# Patient Record
Sex: MALE | Race: ASIAN | NOT HISPANIC OR LATINO | URBAN - METROPOLITAN AREA
[De-identification: names, ages, dates, MRNs, and addresses within clinical notes are randomized per-mention and may not be internally consistent; named-entity substitution may affect disease eponyms.]

---

## 2023-07-11 ENCOUNTER — INPATIENT (INPATIENT)
Facility: HOSPITAL | Age: 63
LOS: 1 days | Discharge: ANOTHER HOSPITAL | End: 2023-07-13
Attending: INTERNAL MEDICINE | Admitting: INTERNAL MEDICINE
Payer: COMMERCIAL

## 2023-07-11 VITALS
DIASTOLIC BLOOD PRESSURE: 82 MMHG | SYSTOLIC BLOOD PRESSURE: 136 MMHG | TEMPERATURE: 98 F | RESPIRATION RATE: 18 BRPM | HEART RATE: 118 BPM | OXYGEN SATURATION: 100 %

## 2023-07-11 DIAGNOSIS — I21.3 ST ELEVATION (STEMI) MYOCARDIAL INFARCTION OF UNSPECIFIED SITE: ICD-10-CM

## 2023-07-11 LAB
ALBUMIN SERPL ELPH-MCNC: 4.6 G/DL — SIGNIFICANT CHANGE UP (ref 3.3–5)
ALP SERPL-CCNC: 73 U/L — SIGNIFICANT CHANGE UP (ref 40–120)
ALT FLD-CCNC: 16 U/L — SIGNIFICANT CHANGE UP (ref 4–41)
ANION GAP SERPL CALC-SCNC: 17 MMOL/L — HIGH (ref 7–14)
APTT BLD: 28.8 SEC — SIGNIFICANT CHANGE UP (ref 27–36.3)
AST SERPL-CCNC: 19 U/L — SIGNIFICANT CHANGE UP (ref 4–40)
BASOPHILS # BLD AUTO: 0.03 K/UL — SIGNIFICANT CHANGE UP (ref 0–0.2)
BASOPHILS NFR BLD AUTO: 0.5 % — SIGNIFICANT CHANGE UP (ref 0–2)
BILIRUB SERPL-MCNC: 0.4 MG/DL — SIGNIFICANT CHANGE UP (ref 0.2–1.2)
BUN SERPL-MCNC: 15 MG/DL — SIGNIFICANT CHANGE UP (ref 7–23)
CALCIUM SERPL-MCNC: 10 MG/DL — SIGNIFICANT CHANGE UP (ref 8.4–10.5)
CHLORIDE SERPL-SCNC: 100 MMOL/L — SIGNIFICANT CHANGE UP (ref 98–107)
CO2 SERPL-SCNC: 21 MMOL/L — LOW (ref 22–31)
CREAT SERPL-MCNC: 0.98 MG/DL — SIGNIFICANT CHANGE UP (ref 0.5–1.3)
EGFR: 87 ML/MIN/1.73M2 — SIGNIFICANT CHANGE UP
EOSINOPHIL # BLD AUTO: 0.27 K/UL — SIGNIFICANT CHANGE UP (ref 0–0.5)
EOSINOPHIL NFR BLD AUTO: 4.2 % — SIGNIFICANT CHANGE UP (ref 0–6)
GLUCOSE SERPL-MCNC: 110 MG/DL — HIGH (ref 70–99)
HCT VFR BLD CALC: 42.7 % — SIGNIFICANT CHANGE UP (ref 39–50)
HGB BLD-MCNC: 14.3 G/DL — SIGNIFICANT CHANGE UP (ref 13–17)
IANC: 3.58 K/UL — SIGNIFICANT CHANGE UP (ref 1.8–7.4)
IMM GRANULOCYTES NFR BLD AUTO: 0.2 % — SIGNIFICANT CHANGE UP (ref 0–0.9)
INR BLD: 0.99 RATIO — SIGNIFICANT CHANGE UP (ref 0.88–1.16)
LYMPHOCYTES # BLD AUTO: 1.84 K/UL — SIGNIFICANT CHANGE UP (ref 1–3.3)
LYMPHOCYTES # BLD AUTO: 28.6 % — SIGNIFICANT CHANGE UP (ref 13–44)
MAGNESIUM SERPL-MCNC: 2 MG/DL — SIGNIFICANT CHANGE UP (ref 1.6–2.6)
MCHC RBC-ENTMCNC: 28 PG — SIGNIFICANT CHANGE UP (ref 27–34)
MCHC RBC-ENTMCNC: 33.5 GM/DL — SIGNIFICANT CHANGE UP (ref 32–36)
MCV RBC AUTO: 83.7 FL — SIGNIFICANT CHANGE UP (ref 80–100)
MONOCYTES # BLD AUTO: 0.71 K/UL — SIGNIFICANT CHANGE UP (ref 0–0.9)
MONOCYTES NFR BLD AUTO: 11 % — SIGNIFICANT CHANGE UP (ref 2–14)
NEUTROPHILS # BLD AUTO: 3.58 K/UL — SIGNIFICANT CHANGE UP (ref 1.8–7.4)
NEUTROPHILS NFR BLD AUTO: 55.5 % — SIGNIFICANT CHANGE UP (ref 43–77)
NRBC # BLD: 0 /100 WBCS — SIGNIFICANT CHANGE UP (ref 0–0)
NRBC # FLD: 0 K/UL — SIGNIFICANT CHANGE UP (ref 0–0)
NT-PROBNP SERPL-SCNC: 169 PG/ML — SIGNIFICANT CHANGE UP
PLATELET # BLD AUTO: 206 K/UL — SIGNIFICANT CHANGE UP (ref 150–400)
POTASSIUM SERPL-MCNC: 3.9 MMOL/L — SIGNIFICANT CHANGE UP (ref 3.5–5.3)
POTASSIUM SERPL-SCNC: 3.9 MMOL/L — SIGNIFICANT CHANGE UP (ref 3.5–5.3)
PROT SERPL-MCNC: 7.4 G/DL — SIGNIFICANT CHANGE UP (ref 6–8.3)
PROTHROM AB SERPL-ACNC: 11.5 SEC — SIGNIFICANT CHANGE UP (ref 10.5–13.4)
RBC # BLD: 5.1 M/UL — SIGNIFICANT CHANGE UP (ref 4.2–5.8)
RBC # FLD: 12 % — SIGNIFICANT CHANGE UP (ref 10.3–14.5)
SODIUM SERPL-SCNC: 138 MMOL/L — SIGNIFICANT CHANGE UP (ref 135–145)
TROPONIN T, HIGH SENSITIVITY RESULT: 48 NG/L — SIGNIFICANT CHANGE UP
TSH SERPL-MCNC: 2.71 UIU/ML — SIGNIFICANT CHANGE UP (ref 0.27–4.2)
WBC # BLD: 6.44 K/UL — SIGNIFICANT CHANGE UP (ref 3.8–10.5)
WBC # FLD AUTO: 6.44 K/UL — SIGNIFICANT CHANGE UP (ref 3.8–10.5)

## 2023-07-11 PROCEDURE — 99291 CRITICAL CARE FIRST HOUR: CPT

## 2023-07-11 PROCEDURE — 93010 ELECTROCARDIOGRAM REPORT: CPT

## 2023-07-11 PROCEDURE — 99223 1ST HOSP IP/OBS HIGH 75: CPT

## 2023-07-11 RX ORDER — HEPARIN SODIUM 5000 [USP'U]/ML
4000 INJECTION INTRAVENOUS; SUBCUTANEOUS EVERY 6 HOURS
Refills: 0 | Status: DISCONTINUED | OUTPATIENT
Start: 2023-07-11 | End: 2023-07-11

## 2023-07-11 RX ORDER — DEXTROSE 50 % IN WATER 50 %
15 SYRINGE (ML) INTRAVENOUS ONCE
Refills: 0 | Status: DISCONTINUED | OUTPATIENT
Start: 2023-07-11 | End: 2023-07-13

## 2023-07-11 RX ORDER — FAMOTIDINE 10 MG/ML
1 INJECTION INTRAVENOUS
Refills: 0 | DISCHARGE

## 2023-07-11 RX ORDER — INSULIN LISPRO 100/ML
VIAL (ML) SUBCUTANEOUS
Refills: 0 | Status: DISCONTINUED | OUTPATIENT
Start: 2023-07-11 | End: 2023-07-13

## 2023-07-11 RX ORDER — TICAGRELOR 90 MG/1
90 TABLET ORAL EVERY 12 HOURS
Refills: 0 | Status: DISCONTINUED | OUTPATIENT
Start: 2023-07-12 | End: 2023-07-12

## 2023-07-11 RX ORDER — INSULIN LISPRO 100/ML
VIAL (ML) SUBCUTANEOUS AT BEDTIME
Refills: 0 | Status: DISCONTINUED | OUTPATIENT
Start: 2023-07-11 | End: 2023-07-13

## 2023-07-11 RX ORDER — ATORVASTATIN CALCIUM 80 MG/1
80 TABLET, FILM COATED ORAL AT BEDTIME
Refills: 0 | Status: DISCONTINUED | OUTPATIENT
Start: 2023-07-11 | End: 2023-07-13

## 2023-07-11 RX ORDER — METOPROLOL TARTRATE 50 MG
2.5 TABLET ORAL ONCE
Refills: 0 | Status: COMPLETED | OUTPATIENT
Start: 2023-07-11 | End: 2023-07-11

## 2023-07-11 RX ORDER — HEPARIN SODIUM 5000 [USP'U]/ML
4000 INJECTION INTRAVENOUS; SUBCUTANEOUS ONCE
Refills: 0 | Status: DISCONTINUED | OUTPATIENT
Start: 2023-07-11 | End: 2023-07-11

## 2023-07-11 RX ORDER — DEXTROSE 50 % IN WATER 50 %
12.5 SYRINGE (ML) INTRAVENOUS ONCE
Refills: 0 | Status: DISCONTINUED | OUTPATIENT
Start: 2023-07-11 | End: 2023-07-13

## 2023-07-11 RX ORDER — CHLORHEXIDINE GLUCONATE 213 G/1000ML
1 SOLUTION TOPICAL DAILY
Refills: 0 | Status: DISCONTINUED | OUTPATIENT
Start: 2023-07-12 | End: 2023-07-13

## 2023-07-11 RX ORDER — CHOLECALCIFEROL (VITAMIN D3) 125 MCG
1 CAPSULE ORAL
Refills: 0 | DISCHARGE

## 2023-07-11 RX ORDER — METOPROLOL TARTRATE 50 MG
25 TABLET ORAL ONCE
Refills: 0 | Status: COMPLETED | OUTPATIENT
Start: 2023-07-11 | End: 2023-07-11

## 2023-07-11 RX ORDER — HEPARIN SODIUM 5000 [USP'U]/ML
5000 INJECTION INTRAVENOUS; SUBCUTANEOUS ONCE
Refills: 0 | Status: COMPLETED | OUTPATIENT
Start: 2023-07-11 | End: 2023-07-11

## 2023-07-11 RX ORDER — DEXTROSE 50 % IN WATER 50 %
25 SYRINGE (ML) INTRAVENOUS ONCE
Refills: 0 | Status: DISCONTINUED | OUTPATIENT
Start: 2023-07-11 | End: 2023-07-13

## 2023-07-11 RX ORDER — HEPARIN SODIUM 5000 [USP'U]/ML
1000 INJECTION INTRAVENOUS; SUBCUTANEOUS
Qty: 25000 | Refills: 0 | Status: DISCONTINUED | OUTPATIENT
Start: 2023-07-11 | End: 2023-07-13

## 2023-07-11 RX ORDER — NITROGLYCERIN 6.5 MG
10 CAPSULE, EXTENDED RELEASE ORAL
Qty: 50 | Refills: 0 | Status: DISCONTINUED | OUTPATIENT
Start: 2023-07-11 | End: 2023-07-12

## 2023-07-11 RX ORDER — METOPROLOL TARTRATE 50 MG
12.5 TABLET ORAL
Refills: 0 | Status: DISCONTINUED | OUTPATIENT
Start: 2023-07-12 | End: 2023-07-12

## 2023-07-11 RX ORDER — ASPIRIN/CALCIUM CARB/MAGNESIUM 324 MG
81 TABLET ORAL DAILY
Refills: 0 | Status: DISCONTINUED | OUTPATIENT
Start: 2023-07-12 | End: 2023-07-13

## 2023-07-11 RX ORDER — SODIUM CHLORIDE 9 MG/ML
1000 INJECTION, SOLUTION INTRAVENOUS
Refills: 0 | Status: DISCONTINUED | OUTPATIENT
Start: 2023-07-11 | End: 2023-07-13

## 2023-07-11 RX ORDER — GLUCAGON INJECTION, SOLUTION 0.5 MG/.1ML
1 INJECTION, SOLUTION SUBCUTANEOUS ONCE
Refills: 0 | Status: DISCONTINUED | OUTPATIENT
Start: 2023-07-11 | End: 2023-07-13

## 2023-07-11 RX ORDER — HEPARIN SODIUM 5000 [USP'U]/ML
INJECTION INTRAVENOUS; SUBCUTANEOUS
Qty: 25000 | Refills: 0 | Status: DISCONTINUED | OUTPATIENT
Start: 2023-07-11 | End: 2023-07-11

## 2023-07-11 RX ORDER — ASPIRIN/CALCIUM CARB/MAGNESIUM 324 MG
324 TABLET ORAL ONCE
Refills: 0 | Status: COMPLETED | OUTPATIENT
Start: 2023-07-11 | End: 2023-07-11

## 2023-07-11 RX ORDER — ATORVASTATIN CALCIUM 80 MG/1
1 TABLET, FILM COATED ORAL
Refills: 0 | DISCHARGE

## 2023-07-11 RX ADMIN — HEPARIN SODIUM 1000 UNIT(S)/HR: 5000 INJECTION INTRAVENOUS; SUBCUTANEOUS at 22:12

## 2023-07-11 RX ADMIN — Medication 324 MILLIGRAM(S): at 21:34

## 2023-07-11 RX ADMIN — ATORVASTATIN CALCIUM 80 MILLIGRAM(S): 80 TABLET, FILM COATED ORAL at 23:15

## 2023-07-11 RX ADMIN — Medication 2.5 MILLIGRAM(S): at 22:04

## 2023-07-11 RX ADMIN — HEPARIN SODIUM 10 UNIT(S)/HR: 5000 INJECTION INTRAVENOUS; SUBCUTANEOUS at 23:38

## 2023-07-11 RX ADMIN — HEPARIN SODIUM 5000 UNIT(S): 5000 INJECTION INTRAVENOUS; SUBCUTANEOUS at 22:11

## 2023-07-11 RX ADMIN — Medication 3 MICROGRAM(S)/MIN: at 23:11

## 2023-07-11 RX ADMIN — Medication 25 MILLIGRAM(S): at 22:04

## 2023-07-11 NOTE — ED PROVIDER NOTE - OBJECTIVE STATEMENT
63y male with hx HTN DM on metformin presents after +stress test today at 2pm. afterwards had chest pressure and generalized weakness. had chest pain initially 1 month ago that radiated to the L upper extremity. since then CP had resolved, but had ROWLAND. had stress test with Dr. Posada at 1400 which was positive. at ~1530 began to have chest pressure. non radicular. no sob at rest no numbness tingling. weak in the lower extremities. given brilinta by Dr. Posada. no aspirin given. no hx AFIB. no recent illness.

## 2023-07-11 NOTE — ED PROVIDER NOTE - PHYSICAL EXAMINATION
General: non-toxic, NAD  HEENT: NCAT, PERRL, no conjunctival pallor   Cardiac: RRR, no murmurs, 2+ peripheral pulses  Resp: CTAB  Abdomen: soft, non-distended, bowel sounds present, no ttp, no rebound or guarding. no organomegaly  Extremities: no peripheral edema, calf tenderness, or leg size discrepancies  Skin: no rashes  Neuro: AAOx4, 5+motor, sensation grossly intact  Psych: mood and affect appropriate

## 2023-07-11 NOTE — ED PROVIDER NOTE - CLINICAL SUMMARY MEDICAL DECISION MAKING FREE TEXT BOX
well appearing male presents in rapid AFIB with chest pressure after positive stress today. STEMI alert called on arrival to resusc bay. elevated BP. ASA given and heparin to be initiated. telemetry. CCU eval possible cath. TBA.

## 2023-07-11 NOTE — H&P ADULT - HISTORY OF PRESENT ILLNESS
Patient is 63 year old male with PMHx HTN, T2DM on metformin who presents to [facility] ED after +stress test 7/11 at 2pm. Patient afterwards ~@1530 began complaining of severe chest pressure with radiation to L arm/shoulder and generalized weakness. Pt states he had similar chest pain ~1 month ago but notes chest pain at that time resolved. Patient denies SOB, palpitations, history of A fib or recent illness. Patient follows with cardiologist Dr. Posada.  Patient received Brilinta 180 mg x1 in Dr. Posada's office,   ED course- cardiology was called initially for STEMI alert. Upon review of EKG, pt does not meet STEMI criteria- , A fib RVR, LEON in aVR with ST depressions leads II, III, aVF, V3-V6. Patient with continued chest pressure. Pt s/p load with ASA/Heparin as per ACS protocol, 2.5 mg IVP Lopressor x1 and 25 mg PO Lopressor for rate control.   Patient is 63 year old male with PMHx HTN, T2DM on metformin who presents to [facility] ED after +stress test 7/11 at 2pm. Patient afterwards ~@1530 began complaining of severe chest pressure with radiation to L arm/shoulder and generalized weakness. Pt states he had similar chest pain ~1 month ago but notes chest pain at that time resolved. Patient admits to extensive family history of CAD. Patient denies SOB, palpitations, history of CAD, HF, or recent illness. Patient follows with cardiologist Dr. Posada.  Patient received Brilinta 180 mg x1 in Dr. Posada's office,   ED course- cardiology was called initially for STEMI alert. Upon review of EKG, pt does not meet STEMI criteria- , A fib RVR, LEON in aVR with ST depressions leads II, III, aVF, V3-V6. Patient with continued chest pressure. Pt s/p load with ASA/Heparin as per ACS protocol, 2.5 mg IVP Lopressor x1 and 25 mg PO Lopressor for rate control. Troponin 48.   Patient is 63 year old male with PMHx HTN, T2DM on metformin who presents to [facility] ED after stress test 7/11 at 2pm. Patient afterwards ~@1530 began complaining of severe chest pressure with radiation to L arm/shoulder and generalized weakness. Pt states he had similar chest pain ~1 month ago but notes chest pain at that time resolved. Patient admits to extensive family history of CAD. Patient denies SOB, palpitations, history of CAD, HF, or recent illness. Patient follows with cardiologist Dr. Posada.  Patient received Brilinta 180 mg x1 in Dr. Posada's office,   ED course- cardiology was called initially for STEMI alert. Upon review of EKG, pt does not meet STEMI criteria- , A fib RVR, LEON in aVR with ST depressions leads II, III, aVF, V3-V6. Patient with continued chest pressure. Pt s/p load with ASA/Heparin as per ACS protocol, 2.5 mg IVP Lopressor x1 and 25 mg PO Lopressor for rate control. Troponin 48.   Patient is 63 year old male with PMHx HTN, T2DM on metformin who presents to [facility] ED after +stress test 7/11 at 2pm. Patient afterwards ~@1530 began complaining of severe chest pressure with radiation to L arm/shoulder and generalized weakness. Pt states he had similar chest pain ~1 month ago but notes chest pain at that time resolved. Patient admits to extensive family history of CAD. Patient denies SOB, palpitations, history of CAD, HF, or recent illness. Patient follows with cardiologist Dr. Posada.  Patient received Brilinta 180 mg x1 in Dr. Posada's office,   ED course- cardiology was called initially for STEMI alert. Upon review of EKG, pt does not meet STEMI criteria- , A fib RVR, LEON in aVR with ST depressions leads II, III, aVF, V3-V6. Patient with continued chest pressure. Pt s/p load with ASA/Heparin as per ACS protocol, 2.5 mg IVP Lopressor x1 and 25 mg PO Lopressor for rate control. Troponin 48.

## 2023-07-11 NOTE — H&P ADULT - NSICDXPASTMEDICALHX_GEN_ALL_CORE_FT
PAST MEDICAL HISTORY:  Diabetes mellitus     HTN (hypertension)      PAST MEDICAL HISTORY:  Diabetes mellitus     GERD (gastroesophageal reflux disease)     HTN (hypertension)     Hyperlipidemia

## 2023-07-11 NOTE — ED PROVIDER NOTE - PROGRESS NOTE DETAILS
VINCE Mittal PGY3 triage ecg with inferolateral depressions and AVR elevation. Dr. Posada made aware. Dr. Tapia has called STEMI alert. CCU to see. heparin to be ordered once documented weight.

## 2023-07-11 NOTE — H&P ADULT - NSHPSOCIALHISTORY_GEN_ALL_CORE
Denies history of smoking, ETOH use, illicit drug use  Patient is  and works as a /- no heavy labor involved

## 2023-07-11 NOTE — H&P ADULT - NSHPLABSRESULTS_GEN_ALL_CORE
Current Antibiotics:    Other medications:  atorvastatin 80 milliGRAM(s) Oral at bedtime  dextrose 5%. 1000 milliLiter(s) IV Continuous <Continuous>  dextrose 5%. 1000 milliLiter(s) IV Continuous <Continuous>  dextrose 50% Injectable 25 Gram(s) IV Push once  dextrose 50% Injectable 12.5 Gram(s) IV Push once  dextrose 50% Injectable 25 Gram(s) IV Push once  glucagon  Injectable 1 milliGRAM(s) IntraMuscular once  heparin  Infusion 1000 Unit(s)/Hr IV Continuous <Continuous>  insulin lispro (ADMELOG) corrective regimen sliding scale   SubCutaneous three times a day before meals  insulin lispro (ADMELOG) corrective regimen sliding scale   SubCutaneous at bedtime  nitroglycerin  Infusion 10 MICROgram(s)/Min IV Continuous <Continuous>      =======================================================  Labs:                        14.3   6.44  )-----------( 206 ( 11 Jul 2023 21:55 )             42.7     07-11    138  |  100  |  15  ----------------------------<  110<H>  3.9   |  21<L>  |  0.98    Ca    10.0      11 Jul 2023 21:55  Mg     2.00     07-11    TPro  7.4  /  Alb  4.6  /  TBili  0.4  /  DBili  x   /  AST  19  /  ALT  16  /  AlkPhos  73  07-11      Creatinine: 0.98 mg/dL (07-11-23 @ 21:55)            WBC Count: 6.44 K/uL (07-11-23 @ 21:55)        Alkaline Phosphatase: 73 U/L (07-11-23 @ 21:55)  Alanine Aminotransferase (ALT/SGPT): 16 U/L (07-11-23 @ 21:55)  Aspartate Aminotransferase (AST/SGOT): 19 U/L (07-11-23 @ 21:55)  Bilirubin Total: 0.4 mg/dL (07-11-23 @ 21:55)

## 2023-07-11 NOTE — ED ADULT TRIAGE NOTE - CHIEF COMPLAINT QUOTE
pt coming from stress test.  pt states he failed his stress test and was told to come to the ED.  pt c/o chest "congestion".  Hx:  DM, HTN, HLD

## 2023-07-11 NOTE — H&P ADULT - NSHPPHYSICALEXAM_GEN_ALL_CORE
VITALS:   T(C): 36.7 (07-11-23 @ 22:41), Max: 36.9 (07-11-23 @ 21:31)  HR: 84 (07-11-23 @ 23:00) (84 - 125)  BP: 120/86 (07-11-23 @ 23:00) (89/79 - 160/106)  RR: 17 (07-11-23 @ 23:00) (16 - 22)  SpO2: 99% (07-11-23 @ 23:00) (99% - 100%)    GENERAL: NAD, lying in bed comfortably  HEAD:  Normocephalic  EYES: Sclera clear  ENT: Moist mucous membranes  NECK: Supple, No JVD  CHEST/LUNG: Clear to auscultation bilaterally; No rales, rhonchi, wheezing, or rubs. Unlabored respirations  HEART: Regular rate and rhythm; No murmurs, rubs, or gallops  ABDOMEN: BSx4; Soft, nontender, nondistended  EXTREMITIES:  2+ Peripheral Pulses, brisk capillary refill. No clubbing, cyanosis, or edema  NERVOUS SYSTEM:  A&Ox3, no focal deficits   SKIN: No rashes or lesions VITALS:   T(C): 36.7 (07-11-23 @ 22:41), Max: 36.9 (07-11-23 @ 21:31)  HR: 84 (07-11-23 @ 23:00) (84 - 125)  BP: 120/86 (07-11-23 @ 23:00) (89/79 - 160/106)  RR: 17 (07-11-23 @ 23:00) (16 - 22)  SpO2: 99% (07-11-23 @ 23:00) (99% - 100%)    GENERAL: NAD, cooperative with exam  HEAD:  Normocephalic  EYES: Sclera clear  ENT: Moist mucous membranes  NECK: Supple, No JVD  CHEST/LUNG: Clear to auscultation bilaterally; No rales, rhonchi, wheezing, or rubs. Unlabored respirations  HEART: +Irregularly irregular rhythm, +S1 S2, No murmurs, rubs, or gallops  ABDOMEN: BSx4; Soft, nontender, nondistended  EXTREMITIES:  2+ Peripheral Pulses, brisk capillary refill. No clubbing, cyanosis, or peripheral edema  NERVOUS SYSTEM:  A&Ox3, no focal deficits   SKIN: No rashes or lesions

## 2023-07-11 NOTE — ED ADULT NURSE NOTE - OBJECTIVE STATEMENT
63 year old male coming for chest pain. AS per pt he has been having chest pain for about 1 month. Pt had a stress test today, had chest pain after. Endorses back pain Denies nausea, vomiting, sob, dizziness, headache, arm pain, jaw pain, diaphoresis.  Pt talking in full sentences. Lungs clear, Elizabeth RN-63 year old male coming for chest pain. AS per pt he has been having chest pain for about 1 month. Pt had a stress test today which was positive , had chest pain after, sent to ED. Endorses back pain Denies nausea, vomiting, sob, dizziness, headache, arm pain, jaw pain, diaphoresis.  Pt talking in full sentences. Lungs clear, 18G placed in right a/c , 20g in right hand labs sent, pt medicated.

## 2023-07-11 NOTE — ED PROVIDER NOTE - NS ED ROS FT
Constitutional: no fevers, chills  HEENT: no HA, vision changes, rhinorrhea, sore throat  Cardiac: +chest pain, palpitations  Respiratory: no SOB, cough or hemoptysis  GI: no n/v/d/c, abd pain, bloody or dark stools  : no dysuria, frequency, or hematuria  MSK: no joint pain, neck pain or back pain  Skin: no rashes, jaundice, pruritis  Neuro: no numbness/tingling, +weakness, no unsteady gait

## 2023-07-11 NOTE — ED PROVIDER NOTE - ATTENDING CONTRIBUTION TO CARE
63y male with hx HTN DM on metformin presents after +stress test today at 2pm. afterwards had chest pressure and generalized weakness. had chest pain initially 1 month ago that radiated to the L upper extremity. since then CP had resolved, but had ROWLAND. had stress test with Dr. Posada at 1400 which was positive. at ~1530 began to have chest pressure. non radicular. no sob at rest no numbness tingling. weak in the lower extremities. given brilinta by Dr. Posada. no aspirin given. no hx AFIB. no recent illness.  pt stll with squeezing chest pressure, no sob  tachycardic  code stemi called, dw dr riley obregon, will not activate cath labs, recs to start heparin gtt and CCU to eval

## 2023-07-11 NOTE — H&P ADULT - ASSESSMENT
Patient is 63 year old male with PMHx HTN, T2DM on metformin who presents to [facility] ED after +stress test 7/11 at 2pm. Patient afterwards ~@1530 began complaining of severe chest pressure with radiation to L arm/shoulder and generalized weakness. Patient s/p Brilinta 180 mg x1 in cardiologist Dr. Posada's office, s/p load with ASA/Heparin as per ACS protocol in ED, EKG significant for , A fib RVR, LEON in aVR with ST depressions leads II, III, aVF, V3-V6. Patient does not meet STEMI criteria. Pt admitted to CCU for further management of NSTEMI and symptomatic treatment.    NEUROLOGIC:  #AOx4, no active issues    RESPIRATORY:  #Satting well on room air, no active issues, pt denies SOB    CARDIOVASCULAR:  #NSTEMI  - Pt s/p Brilinta 180 mg x1 in cardiologist office after stress test, s/p recommended ACS protocol ASA/Heparin load in ED  - Pt admitted to CCU for NSTEMI management  - Recommend ASA 81 mg daily, Lipitor 80 mg daily, Brilinta 90 mg BID  - Troponin 48  - Trend troponin, CK, CK-MB, CPK to peak  - BRITTNEY score   - EKG significant for ST depressions in leads II, III, aVF, V3-V6, A fib RVR with , LEON in aVR, not meeting STEMI criteria  - Nitroglycerin gtt started @10 mcg/min for continued chest pain  - F/U TTE in AM for further assessment of LV function  - proBNP 169, TSH wnl 2.71  - F/U risk factor labs A1C, Lipid profile  - ?Eventual [facility] to follow    #Atrial fibrillation with RVR  - CHADsVASc 2  - Pt s/p 2.5 mg IVP x1 Lopressor and 25 mg PO Lopressor in ED for rate control  - Continue to monitor HR  - Heparin gtt started    GASTROINTESTINAL:  #No active issues    /RENAL:  #BUN/Cr wnl, no active issues    ENDOCRINE:  #H/O T2DM  - F/U A1C    ID:  #Afebrile without leukocytosis, no active issues    DVT PPX:  - Heparin gtt full AC   Patient is 63 year old male with PMHx HTN, T2DM on metformin, HLD, GERD who presents to [facility] ED after +stress test 7/11 at 2pm. Patient afterwards ~@1530 began complaining of severe chest pressure with radiation to L arm/shoulder and generalized weakness. Patient s/p Brilinta 180 mg x1 in cardiologist Dr. Posada's office, s/p load with ASA/Heparin as per ACS protocol in ED. Initial troponin 48, pro , normal BUN/Cr, TSH wnl. EKG significant for , A fib RVR, LEON in aVR with ST depressions leads II, III, aVF, V3-V6. Patient does not meet STEMI criteria. Pt admitted to CCU for further management of NSTEMI and symptomatic treatment.    NEUROLOGIC:  #AOx4, no active issues    RESPIRATORY:  #Satting well on room air, no active issues, pt denies SOB    CARDIOVASCULAR:  #NSTEMI  - Pt s/p Brilinta 180 mg x1 in cardiologist office after stress test, s/p recommended ACS protocol ASA/Heparin load in ED  - Pt admitted to CCU for NSTEMI management  - Recommend ASA 81 mg daily, Lipitor 80 mg daily, Brilinta 90 mg BID  - Troponin 48  - Trend troponin, CK, CK-MB, CPK to peak  - BRITTNEY score 1  - EKG significant for ST depressions in leads II, III, aVF, V3-V6, A fib RVR with , LEON in aVR, not meeting STEMI criteria  - Nitroglycerin gtt started @10 mcg/min for continued chest pain  - F/U TTE in AM for further assessment of LV function  - proBNP 169, TSH wnl 2.71  - F/U risk factor labs A1C, Lipid profile  - ?Eventual [facility] to follow     #Atrial fibrillation with RVR  - CHADsVASc 2  - Pt s/p 2.5 mg IVP x1 Lopressor and 25 mg PO Lopressor in ED for rate control  - Start Lopressor 12.5 mg BID  - Heparin gtt started    #HTN  - Hold home amlodipine-olmesartan     #HLD  - Lipitor 80 mg daily    GASTROINTESTINAL:  #H/O GERD  - Continue home Famotidine  - No active issues    /RENAL:  #BUN/Cr wnl 15/0.98, no active issues    ENDOCRINE:  #H/O T2DM  - F/U A1C  - ISS    ID:  #Afebrile without leukocytosis, no active issues    DVT PPX:  - Heparin gtt full AC   Patient is 63 year old male with PMHx HTN, T2DM on metformin, HLD, GERD who presents to [facility] ED after +stress test 7/11 at 2pm. Patient afterwards ~@1530 began complaining of severe chest pressure with radiation to L arm/shoulder and generalized weakness. Patient s/p Brilinta 180 mg x1 in cardiologist Dr. Posada's office, s/p load with ASA/Heparin as per ACS protocol in ED. Initial troponin 48, pro , normal BUN/Cr, TSH wnl. EKG significant for , A fib RVR, LEON in aVR with ST depressions leads II, III, aVF, V3-V6. Patient does not meet STEMI criteria. Pt admitted to CCU for further management of NSTEMI and symptomatic treatment.    NEUROLOGIC:  #AOx4, no active issues    RESPIRATORY:  #Satting well on room air, no active issues, pt denies SOB    CARDIOVASCULAR:  #NSTEMI  - Pt s/p Brilinta 180 mg x1 in cardiologist office after stress test, s/p recommended ACS protocol ASA/Heparin load in ED  - Pt admitted to CCU for NSTEMI management  - Recommend ASA 81 mg daily, Lipitor 80 mg daily, Brilinta 90 mg BID  - Troponin 48  - Trend troponin, CK, CK-MB, CPK to peak  - BRITTNEY score 1  - EKG significant for ST depressions in leads II, III, aVF, V3-V6, A fib RVR with , LEON in aVR, not meeting STEMI criteria  - Nitroglycerin gtt started @10 mcg/min for continued chest pain  - F/U TTE in AM for further assessment of LV function  - proBNP 169, TSH wnl 2.71  - F/U risk factor labs A1C, Lipid profile  - ?Eventual [facility] to follow     #Atrial fibrillation with RVR  - CHADsVASc 2  - Pt s/p 2.5 mg IVP x1 Lopressor and 25 mg PO Lopressor in ED for rate control  - Start Lopressor 12.5 mg BID  - Heparin gtt started    #HTN  - Hold home amlodipine-olmesartan     #HLD  - Lipitor 80 mg daily    GASTROINTESTINAL:  #H/O GERD  - Continue home Famotidine  - No active issues  - NPO except meds    /RENAL:  #BUN/Cr wnl 15/0.98, no active issues    ENDOCRINE:  #H/O T2DM  - F/U A1C  - ISS    ID:  #Afebrile without leukocytosis, no active issues    DVT PPX:  - Heparin gtt full AC   Patient is 63 year old male with PMHx HTN, T2DM on metformin, HLD, GERD who presents to [facility] ED after stress test 7/11 at 2pm. Patient afterwards ~@1530 began complaining of severe chest pressure with radiation to L arm/shoulder and generalized weakness. Patient s/p Brilinta 180 mg x1 in cardiologist Dr. Posada's office, s/p load with ASA/Heparin as per ACS protocol in ED. Initial troponin 48, pro , normal BUN/Cr, TSH wnl. EKG significant for , A fib RVR, LEON in aVR with ST depressions leads II, III, aVF, V3-V6. Patient does not meet STEMI criteria. Pt admitted to CCU for further management of NSTEMI and symptomatic treatment.    NEUROLOGIC:  #AOx4, no active issues    RESPIRATORY:  #Satting well on room air, no active issues, pt denies SOB    CARDIOVASCULAR:  #NSTEMI  - Pt s/p Brilinta 180 mg x1 in cardiologist office after stress test, s/p recommended ACS protocol ASA/Heparin load in ED  - Pt admitted to CCU for NSTEMI management  - Recommend ASA 81 mg daily, Lipitor 80 mg daily, Brilinta 90 mg BID  - Troponin 48  - Trend troponin, CK, CK-MB, CPK to peak  - BRITTNEY score 1  - EKG significant for ST depressions in leads II, III, aVF, V3-V6, A fib RVR with , LEON in aVR, not meeting STEMI criteria  - Nitroglycerin gtt started @10 mcg/min for continued chest pain  - F/U TTE in AM for further assessment of LV function  - proBNP 169, TSH wnl 2.71  - F/U risk factor labs A1C, Lipid profile  - ?Eventual [facility] to follow     #Atrial fibrillation with RVR  - CHADsVASc 2  - Pt s/p 2.5 mg IVP x1 Lopressor and 25 mg PO Lopressor in ED for rate control  - Start Lopressor 12.5 mg BID  - Heparin gtt started    #HTN  - Hold home amlodipine-olmesartan     #HLD  - Lipitor 80 mg daily    GASTROINTESTINAL:  #H/O GERD  - Continue home Famotidine  - No active issues  - NPO except meds    /RENAL:  #BUN/Cr wnl 15/0.98, no active issues    ENDOCRINE:  #H/O T2DM  - F/U A1C  - ISS    ID:  #Afebrile without leukocytosis, no active issues    DVT PPX:  - Heparin gtt full AC   Patient is 63 year old male with PMHx HTN, T2DM on metformin, HLD, GERD who presents to [facility] ED after stress test 7/11 at 2pm. Patient afterwards ~@1530 began complaining of severe chest pressure with radiation to L arm/shoulder and generalized weakness. Patient s/p Brilinta 180 mg x1 in cardiologist Dr. Posada's office, s/p load with ASA/Heparin as per ACS protocol in ED. Initial troponin 48, pro , normal BUN/Cr, TSH wnl. EKG significant for , A fib RVR, LEON in aVR with ST depressions leads II, III, aVF, V3-V6. Patient does not meet STEMI criteria. Pt admitted to CCU for further management of NSTEMI and symptomatic treatment.    NEUROLOGIC:  #AOx4, no active issues    RESPIRATORY:  #Satting well on room air, no active issues, pt denies SOB    CARDIOVASCULAR:  #NSTEMI  - Pt s/p Brilinta 180 mg x1 in cardiologist office after stress test, s/p recommended ACS protocol ASA/Heparin load in ED  - Pt admitted to CCU for NSTEMI management  - Recommend ASA 81 mg daily, Lipitor 80 mg daily, Brilinta 90 mg BID  - Troponin 48  - Trend troponin, CK, CK-MB, CPK to peak  - BRITTNEY score 2  - EKG significant for ST depressions in leads II, III, aVF, V3-V6, A fib RVR with , LEON in aVR, not meeting STEMI criteria  - Nitroglycerin gtt started @10 mcg/min for continued chest pain  - F/U TTE in AM for further assessment of LV function  - proBNP 169, TSH wnl 2.71  - F/U risk factor labs A1C, Lipid profile  - ?Eventual [facility] to follow     #Atrial fibrillation with RVR  - CHADsVASc 2  - Pt s/p 2.5 mg IVP x1 Lopressor and 25 mg PO Lopressor in ED for rate control  - Start Lopressor 12.5 mg BID  - Heparin gtt started    #HTN  - Hold home amlodipine-olmesartan     #HLD  - Lipitor 80 mg daily    GASTROINTESTINAL:  #H/O GERD  - Continue home Famotidine  - No active issues  - NPO except meds    /RENAL:  #BUN/Cr wnl 15/0.98, no active issues    ENDOCRINE:  #H/O T2DM  - F/U A1C  - ISS    ID:  #Afebrile without leukocytosis, no active issues    DVT PPX:  - Heparin gtt full AC   Patient is 63 year old male with PMHx HTN, T2DM on metformin, HLD, GERD who presents to [facility] ED after +stress test 7/11 at 2pm. Patient afterwards ~@1530 began complaining of severe chest pressure with radiation to L arm/shoulder and generalized weakness. Patient s/p Brilinta 180 mg x1 in cardiologist Dr. Posada's office, s/p load with ASA/Heparin as per ACS protocol in ED. Initial troponin 48, pro , normal BUN/Cr, TSH wnl. EKG significant for , A fib RVR, LEON in aVR with ST depressions leads II, III, aVF, V3-V6. Patient does not meet STEMI criteria. Pt admitted to CCU for further management of NSTEMI and symptomatic treatment.    NEUROLOGIC:  #AOx4, no active issues    RESPIRATORY:  #Satting well on room air, no active issues, pt denies SOB    CARDIOVASCULAR:  #NSTEMI  - Pt s/p Brilinta 180 mg x1 in cardiologist office after stress test, s/p recommended ACS protocol ASA/Heparin load in ED  - Pt admitted to CCU for NSTEMI management  - Recommend ASA 81 mg daily, Lipitor 80 mg daily, Brilinta 90 mg BID  - Troponin 48  - Trend troponin, CK, CK-MB, CPK to peak  - BRITTNEY score 2  - EKG significant for ST depressions in leads II, III, aVF, V3-V6, A fib RVR with , LEON in aVR, not meeting STEMI criteria  - Nitroglycerin gtt started @10 mcg/min for continued chest pain  - F/U TTE in AM for further assessment of LV function  - proBNP 169, TSH wnl 2.71  - F/U risk factor labs A1C, Lipid profile  - ?Eventual [facility] to follow     #Atrial fibrillation with RVR  - CHADsVASc 2  - Pt s/p 2.5 mg IVP x1 Lopressor and 25 mg PO Lopressor in ED for rate control  - Start Lopressor 12.5 mg BID  - Heparin gtt started    #HTN  - Hold home amlodipine-olmesartan     #HLD  - Lipitor 80 mg daily    GASTROINTESTINAL:  #H/O GERD  - Continue home Famotidine  - No active issues  - NPO except meds    /RENAL:  #BUN/Cr wnl 15/0.98, no active issues    ENDOCRINE:  #H/O T2DM  - F/U A1C  - ISS    ID:  #Afebrile without leukocytosis, no active issues    DVT PPX:  - Heparin gtt full AC

## 2023-07-12 ENCOUNTER — TRANSCRIPTION ENCOUNTER (OUTPATIENT)
Age: 63
End: 2023-07-12

## 2023-07-12 LAB
A1C WITH ESTIMATED AVERAGE GLUCOSE RESULT: 8.2 % — HIGH (ref 4–5.6)
ANION GAP SERPL CALC-SCNC: 13 MMOL/L — SIGNIFICANT CHANGE UP (ref 7–14)
APTT BLD: 27.3 SEC — SIGNIFICANT CHANGE UP (ref 27–36.3)
APTT BLD: 50.7 SEC — HIGH (ref 27–36.3)
APTT BLD: 70.1 SEC — HIGH (ref 27–36.3)
BUN SERPL-MCNC: 15 MG/DL — SIGNIFICANT CHANGE UP (ref 7–23)
CALCIUM SERPL-MCNC: 9.6 MG/DL — SIGNIFICANT CHANGE UP (ref 8.4–10.5)
CHLORIDE SERPL-SCNC: 103 MMOL/L — SIGNIFICANT CHANGE UP (ref 98–107)
CK MB BLD-MCNC: 1.8 % — SIGNIFICANT CHANGE UP (ref 0–2.5)
CK MB BLD-MCNC: 1.9 % — SIGNIFICANT CHANGE UP (ref 0–2.5)
CK MB CFR SERPL CALC: 1.7 NG/ML — SIGNIFICANT CHANGE UP
CK MB CFR SERPL CALC: 2 NG/ML — SIGNIFICANT CHANGE UP
CK MB CFR SERPL CALC: 2 NG/ML — SIGNIFICANT CHANGE UP
CK SERPL-CCNC: 109 U/L — SIGNIFICANT CHANGE UP (ref 30–200)
CK SERPL-CCNC: 88 U/L — SIGNIFICANT CHANGE UP (ref 30–200)
CK SERPL-CCNC: 97 U/L — SIGNIFICANT CHANGE UP (ref 30–200)
CO2 SERPL-SCNC: 22 MMOL/L — SIGNIFICANT CHANGE UP (ref 22–31)
CREAT SERPL-MCNC: 0.97 MG/DL — SIGNIFICANT CHANGE UP (ref 0.5–1.3)
EGFR: 88 ML/MIN/1.73M2 — SIGNIFICANT CHANGE UP
ESTIMATED AVERAGE GLUCOSE: 189 — SIGNIFICANT CHANGE UP
GLUCOSE SERPL-MCNC: 120 MG/DL — HIGH (ref 70–99)
HCT VFR BLD CALC: 39.3 % — SIGNIFICANT CHANGE UP (ref 39–50)
HCV AB S/CO SERPL IA: 0.24 S/CO — SIGNIFICANT CHANGE UP (ref 0–0.99)
HCV AB SERPL-IMP: SIGNIFICANT CHANGE UP
HGB BLD-MCNC: 13.2 G/DL — SIGNIFICANT CHANGE UP (ref 13–17)
INR BLD: 1.06 RATIO — SIGNIFICANT CHANGE UP (ref 0.88–1.16)
MAGNESIUM SERPL-MCNC: 2 MG/DL — SIGNIFICANT CHANGE UP (ref 1.6–2.6)
MCHC RBC-ENTMCNC: 27.8 PG — SIGNIFICANT CHANGE UP (ref 27–34)
MCHC RBC-ENTMCNC: 33.6 GM/DL — SIGNIFICANT CHANGE UP (ref 32–36)
MCV RBC AUTO: 82.7 FL — SIGNIFICANT CHANGE UP (ref 80–100)
NRBC # BLD: 0 /100 WBCS — SIGNIFICANT CHANGE UP (ref 0–0)
NRBC # FLD: 0 K/UL — SIGNIFICANT CHANGE UP (ref 0–0)
PHOSPHATE SERPL-MCNC: 4 MG/DL — SIGNIFICANT CHANGE UP (ref 2.5–4.5)
PLATELET # BLD AUTO: 198 K/UL — SIGNIFICANT CHANGE UP (ref 150–400)
POTASSIUM SERPL-MCNC: 3.8 MMOL/L — SIGNIFICANT CHANGE UP (ref 3.5–5.3)
POTASSIUM SERPL-SCNC: 3.8 MMOL/L — SIGNIFICANT CHANGE UP (ref 3.5–5.3)
PROTHROM AB SERPL-ACNC: 12.3 SEC — SIGNIFICANT CHANGE UP (ref 10.5–13.4)
RBC # BLD: 4.75 M/UL — SIGNIFICANT CHANGE UP (ref 4.2–5.8)
RBC # FLD: 12.1 % — SIGNIFICANT CHANGE UP (ref 10.3–14.5)
SODIUM SERPL-SCNC: 138 MMOL/L — SIGNIFICANT CHANGE UP (ref 135–145)
TROPONIN T, HIGH SENSITIVITY RESULT: 31 NG/L — SIGNIFICANT CHANGE UP
TROPONIN T, HIGH SENSITIVITY RESULT: 41 NG/L — SIGNIFICANT CHANGE UP
TROPONIN T, HIGH SENSITIVITY RESULT: 59 NG/L — CRITICAL HIGH
UFH PPP CHRO-ACNC: 0.27 IU/ML — LOW (ref 0.3–0.7)
WBC # BLD: 5.9 K/UL — SIGNIFICANT CHANGE UP (ref 3.8–10.5)
WBC # FLD AUTO: 5.9 K/UL — SIGNIFICANT CHANGE UP (ref 3.8–10.5)

## 2023-07-12 PROCEDURE — 93458 L HRT ARTERY/VENTRICLE ANGIO: CPT | Mod: 26

## 2023-07-12 PROCEDURE — 71045 X-RAY EXAM CHEST 1 VIEW: CPT | Mod: 26

## 2023-07-12 PROCEDURE — 93306 TTE W/DOPPLER COMPLETE: CPT | Mod: 26

## 2023-07-12 RX ORDER — FAMOTIDINE 10 MG/ML
20 INJECTION INTRAVENOUS DAILY
Refills: 0 | Status: DISCONTINUED | OUTPATIENT
Start: 2023-07-12 | End: 2023-07-13

## 2023-07-12 RX ORDER — POTASSIUM CHLORIDE 20 MEQ
40 PACKET (EA) ORAL ONCE
Refills: 0 | Status: COMPLETED | OUTPATIENT
Start: 2023-07-12 | End: 2023-07-12

## 2023-07-12 RX ORDER — ISOSORBIDE DINITRATE 5 MG/1
5 TABLET ORAL THREE TIMES A DAY
Refills: 0 | Status: DISCONTINUED | OUTPATIENT
Start: 2023-07-12 | End: 2023-07-13

## 2023-07-12 RX ORDER — METOPROLOL TARTRATE 50 MG
25 TABLET ORAL
Refills: 0 | Status: DISCONTINUED | OUTPATIENT
Start: 2023-07-12 | End: 2023-07-13

## 2023-07-12 RX ADMIN — Medication 25 MILLIGRAM(S): at 13:28

## 2023-07-12 RX ADMIN — ATORVASTATIN CALCIUM 80 MILLIGRAM(S): 80 TABLET, FILM COATED ORAL at 21:22

## 2023-07-12 RX ADMIN — Medication 81 MILLIGRAM(S): at 10:39

## 2023-07-12 RX ADMIN — HEPARIN SODIUM 12 UNIT(S)/HR: 5000 INJECTION INTRAVENOUS; SUBCUTANEOUS at 19:37

## 2023-07-12 RX ADMIN — Medication 12.5 MILLIGRAM(S): at 05:11

## 2023-07-12 RX ADMIN — FAMOTIDINE 20 MILLIGRAM(S): 10 INJECTION INTRAVENOUS at 13:28

## 2023-07-12 RX ADMIN — HEPARIN SODIUM 12 UNIT(S)/HR: 5000 INJECTION INTRAVENOUS; SUBCUTANEOUS at 05:13

## 2023-07-12 RX ADMIN — CHLORHEXIDINE GLUCONATE 1 APPLICATION(S): 213 SOLUTION TOPICAL at 14:25

## 2023-07-12 RX ADMIN — Medication 40 MILLIEQUIVALENT(S): at 05:12

## 2023-07-12 RX ADMIN — TICAGRELOR 90 MILLIGRAM(S): 90 TABLET ORAL at 05:11

## 2023-07-12 RX ADMIN — ISOSORBIDE DINITRATE 5 MILLIGRAM(S): 5 TABLET ORAL at 14:25

## 2023-07-12 NOTE — PROGRESS NOTE ADULT - ASSESSMENT
63 year old male with PMHx HTN, T2DM on metformin, HLD, GERD who presents to [facility] ED after +stress test 7/11 at 2pm. Patient afterwards ~@1530 began complaining of severe chest pressure with radiation to L arm/shoulder and generalized weakness. Patient s/p Brilinta 180 mg x1 in cardiologist Dr. Posada's office, s/p load with ASA/Heparin as per ACS protocol in ED. Initial troponin 48, pro , normal BUN/Cr, TSH wnl. EKG significant for , A fib RVR, LEON in aVR with ST depressions leads II, III, aVF, V3-V6. Pt admitted to CCU for further management of NSTEMI and symptomatic treatment.    NEUROLOGIC:  #AOx4, no active issues    RESPIRATORY:  -was wearing NC for comfort  -satting well on RA  -cxr wnl    CARDIOVASCULAR:  #NSTEMI  - Pt s/p Brilinta load. cont maintenance dosing   - maintain on daily ASA, Brilinta and lipitor   - Troponin 48-->59  - Trend troponin, CK, CK-MB, CPK to peak  - BRITTNEY score 2, ZEYAD score 9%  - EKG significant for ST depressions in leads II, III, aVF, V3-V6, A fib RVR, LEON in aVR now resolved on AM CXR  - cont nitro gtt for active chest pain   - F/U TTE in AM for further assessment of LV function  - proBNP 169  - will discuss with interventional team for possible LHC    #Atrial fibrillation with RVR  - CHADsVASc 2  - Pt s/p 2.5 mg IVP x1 Lopressor   -converted to NSR ~3am  - on Lopressor 12.5 mg BID  - A/C as below     #HTN  - Hold home amlodipine-olmesartan     #HLD  - Lipitor 80 mg daily  -f/u lipid panel     GASTROINTESTINAL:  #H/O GERD  - Continue home Famotidine  -abd exam benign   - NPO except meds    /RENAL:  #BUN/Cr wnl 15/0.98  -voiding freely  -repelte lytes as appropriate     ENDOCRINE:  #H/O T2DM  - A1C 8 on metformin  - FS controlled on ISS  -maintain gluc<180   -tsh wnl     ID:  #Afebrile without leukocytosis, no active issues    Heme:  - cbc stable, coags stable   - Heparin gtt full AC  -when post cath, will reassess for need for A/C for parox afib       case and plan discussed with Dr eSllers    63 year old male with PMHx HTN, T2DM on metformin, HLD, GERD who presents to [facility] ED after +stress test 7/11 at 2pm. Patient afterwards ~@1530 began complaining of severe chest pressure with radiation to L arm/shoulder and generalized weakness. Patient s/p Brilinta 180 mg x1 in cardiologist Dr. Posada's office, s/p load with ASA/Heparin as per ACS protocol in ED. Initial troponin 48, pro , normal BUN/Cr, TSH wnl. EKG significant for , A fib RVR, LEON in aVR with ST depressions leads II, III, aVF, V3-V6. Pt admitted to CCU for further management of NSTEMI and symptomatic treatment.    NEUROLOGIC:  #AOx4, no active issues    RESPIRATORY:  -was wearing NC for comfort  -satting well on RA  -cxr wnl    CARDIOVASCULAR:  #NSTEMI  - Pt s/p Brilinta load. cont maintenance dosing   - maintain on daily ASA, Brilinta and lipitor   - Troponin 48-->59  - Trend troponin, CK, CK-MB, CPK to peak  - BRITTNEY score 2, ZEYAD score 120  - EKG significant for ST depressions in leads II, III, aVF, V3-V6, A fib RVR, LEON in aVR now resolved on AM CXR  - cont nitro gtt for active chest pain and titrate for chest pain    - F/U TTE in AM for further assessment of LV function  - proBNP 169  - pending [facility]    #Atrial fibrillation with RVR  - CHADsVASc 2  - Pt s/p 2.5 mg IVP x1 Lopressor   -converted to NSR ~3am  - on Lopressor 12.5 mg BID  - A/C as below     #HTN  - Hold home amlodipine-olmesartan     #HLD  - Lipitor 80 mg daily  -f/u lipid panel     GASTROINTESTINAL:  #H/O GERD  - Continue home Famotidine  -abd exam benign   - NPO except meds    /RENAL:  #BUN/Cr wnl 15/0.98  -voiding freely  -repelte lytes as appropriate     ENDOCRINE:  #H/O T2DM  - A1C 8 on metformin  - FS controlled on ISS  -maintain gluc<180   -tsh wnl     ID:  #Afebrile without leukocytosis, no active issues    Heme:  - cbc stable, coags stable   - Heparin gtt full AC  -when post cath, will reassess for need for A/C for parox afib       case and plan discussed with Dr Sellers

## 2023-07-12 NOTE — PROGRESS NOTE ADULT - SUBJECTIVE AND OBJECTIVE BOX
PATIENT:  DELICIA MIRANDA  [id number]    CHIEF COMPLAINT:  Patient is a 63y old  Male who presents with a chief complaint of chest pain    HPI:   Patient is 63 year old male with PMHx HTN, T2DM on metformin who presents to [facility] ED after +stress test  at 2pm. Patient afterwards ~@1530 began complaining of severe chest pressure with radiation to L arm/shoulder and generalized weakness. Pt states he had similar chest pain ~1 month ago but notes chest pain at that time resolved. Patient admits to extensive family history of CAD. Patient denies SOB, palpitations, history of CAD, HF, or recent illness. Patient follows with cardiologist Dr. Posada.  Patient received Brilinta 180 mg x1 in Dr. Posada's office,   ED course- cardiology was called initially for STEMI alert. Upon review of EKG, pt does not meet STEMI criteria- , A fib RVR, LEON in aVR with ST depressions leads II, III, aVF, V3-V6. Patient with continued chest pressure. Pt s/p load with ASA/Heparin as per ACS protocol, 2.5 mg IVP Lopressor x1 and 25 mg PO Lopressor for rate control. Troponin 48. Pt initiated on nitro gtt for active chest pain.     INTERVAL HISTORYOVERNIGHT EVENTS: pt converted from afib to NSR ~3am with subsequent improvement in ST depressions on EKG.     Subjective: Pt seen and examined at the bedside, pt endorses improvement in his non-radiating pressure-like chest pain from 10/10 in ED to 5/10 this AM. Pt also endorses some episodes of SOB overnight that have since resolved. Pt denies palpitations, lightheadedness, dizziness, cough      REVIEW OF SYSTEMS:    Constitutional:     [ ] negative [ ] fevers [ ] chills [ ] weight loss [ ] weight gain  HEENT:                  [ ] negative [ ] dry eyes [ ] eye irritation [ ] postnasal drip [ ] nasal congestion  CV:                         [ ] negative  [ ] chest pain [ ] orthopnea [ ] palpitations [ ] murmur  Resp:                     [ ] negative [ ] cough [ ] shortness of breath [ ] dyspnea [ ] wheezing [ ] sputum [ ] hemoptysis  GI:                          [ ] negative [ ] nausea [ ] vomiting [ ] diarrhea [ ] constipation [ ] abd pain [ ] dysphagia   :                        [ ] negative [ ] dysuria [ ] nocturia [ ] hematuria [ ] increased urinary frequency  Musculoskeletal: [ ] negative [ ] back pain [ ] myalgias [ ] arthralgias [ ] fracture  Skin:                       [ ] negative [ ] rash [ ] itch  Neurological:        [ ] negative [ ] headache [ ] dizziness [ ] syncope [ ] weakness [ ] numbness  Psychiatric:           [ ] negative [ ] anxiety [ ] depression  Endocrine:            [ ] negative [ ] diabetes [ ] thyroid problem  Heme/Lymph:      [ ] negative [ ] anemia [ ] bleeding problem  Allergic/Immune: [ ] negative [ ] itchy eyes [ ] nasal discharge [ ] hives [ ] angioedema    [x ] All other systems negative    MEDICATIONS:  MEDICATIONS  (STANDING):  aspirin enteric coated 81 milliGRAM(s) Oral daily  atorvastatin 80 milliGRAM(s) Oral at bedtime  chlorhexidine 2% Cloths 1 Application(s) Topical daily  dextrose 5%. 1000 milliLiter(s) (50 mL/Hr) IV Continuous <Continuous>  dextrose 5%. 1000 milliLiter(s) (100 mL/Hr) IV Continuous <Continuous>  dextrose 50% Injectable 25 Gram(s) IV Push once  dextrose 50% Injectable 25 Gram(s) IV Push once  dextrose 50% Injectable 12.5 Gram(s) IV Push once  famotidine    Tablet 20 milliGRAM(s) Oral daily  glucagon  Injectable 1 milliGRAM(s) IntraMuscular once  heparin  Infusion 1000 Unit(s)/Hr (12 mL/Hr) IV Continuous <Continuous>  insulin lispro (ADMELOG) corrective regimen sliding scale   SubCutaneous three times a day before meals  insulin lispro (ADMELOG) corrective regimen sliding scale   SubCutaneous at bedtime  metoprolol tartrate 12.5 milliGRAM(s) Oral two times a day  nitroglycerin  Infusion 10 MICROgram(s)/Min (3 mL/Hr) IV Continuous <Continuous>  ticagrelor 90 milliGRAM(s) Oral every 12 hours    MEDICATIONS  (PRN):  dextrose Oral Gel 15 Gram(s) Oral once PRN Blood Glucose LESS THAN 70 milliGRAM(s)/deciliter      ALLERGIES:  Allergies    No Known Allergies    Intolerances        OBJECTIVE:  ICU Vital Signs Last 24 Hrs  T(C): 36.6 (2023 04:00), Max: 36.9 (2023 21:31)  T(F): 97.8 (2023 04:00), Max: 98.4 (2023 21:31)  HR: 88 (2023 06:00) (65 - 125)  BP: 101/69 (2023 06:00) (86/57 - 160/106)  BP(mean): 80 (2023 06:00) (67 - 96)  ABP: --  ABP(mean): --  RR: 17 (2023 06:00) (14 - 22)  SpO2: 98% (2023 06:00) (97% - 100%)    O2 Parameters below as of 2023 06:00  Patient On (Oxygen Delivery Method): RA      POCT Blood Glucose.: 103 mg/dL (2023 07:34)  POCT Blood Glucose.: 113 mg/dL (2023 22:20)  POCT Blood Glucose.: 131 mg/dL (2023 17:12)    CAPILLARY BLOOD GLUCOSE      POCT Blood Glucose.: 103 mg/dL (2023 07:34)    I&O's Summary    2023 07:01  -  2023 07:00  --------------------------------------------------------  IN: 358 mL / OUT: 300 mL / NET: 58 mL      Daily Height in cm: 167.64 (2023 22:41)    Daily Weight in k (2023 05:00)    PHYSICAL EXAMINATION:  General: WN/WD NAD  HEENT: PERRLA, EOMI, moist mucous membranes  Neurology: A&Ox3, nonfocal, WOMACK x 4  Respiratory: CTA B/L, normal respiratory effort, no wheezes, crackles, rales  CV: NSR  Abdominal: Soft, NT, ND   Extremities: warm, No LE edema, + peripheral pulses      LABS:                          13.2   5.90  )-----------( 198      ( 2023 03:35 )             39.3     07-12    138  |  103  |  15  ----------------------------<  120<H>  3.8   |  22  |  0.97    Ca    9.6      2023 03:35  Phos  4.0     -  Mg     2.00     -12    TPro  7.4  /  Alb  4.6  /  TBili  0.4  /  DBili  x   /  AST  19  /  ALT  16  /  AlkPhos  73  07-11    LIVER FUNCTIONS - ( 2023 21:55 )  Alb: 4.6 g/dL / Pro: 7.4 g/dL / ALK PHOS: 73 U/L / ALT: 16 U/L / AST: 19 U/L / GGT: x           PT/INR - ( 2023 03:35 )   PT: 12.3 sec;   INR: 1.06 ratio         PTT - ( 2023 03:35 )  PTT:50.7 sec  CKMB Units: 2.0 ng/mL ( @ 03:35)  Creatine Kinase, Serum: 109 U/L ( @ 03:35)    CARDIAC MARKERS ( 2023 03:35 )  x     / x     / 109 U/L / x     / 2.0 ng/mL      Urinalysis Basic - ( 2023 03:35 )    Color: x / Appearance: x / SG: x / pH: x  Gluc: 120 mg/dL / Ketone: x  / Bili: x / Urobili: x   Blood: x / Protein: x / Nitrite: x   Leuk Esterase: x / RBC: x / WBC x   Sq Epi: x / Non Sq Epi: x / Bacteria: x        TELEMETRY: converted to NSR ~3am with prior episodes of slow afib     EKG: NSR. ST depressions in lateral and inferior leads resolved     IMAGING: reviewed

## 2023-07-12 NOTE — CHART NOTE - NSCHARTNOTEFT_GEN_A_CORE
CCU DOWN GRADE NOTE  Accepting MD:  cc: Patient is a 63y old  Male who presents with a chief complaint of NSTEMI (12 Jul 2023 07:45)    HPI:  Patient is 63 year old male with PMHx HTN, T2DM on metformin who presents to [facility] ED after +stress test 7/11 at 2pm. Patient afterwards ~@1530 began complaining of severe chest pressure with radiation to L arm/shoulder and generalized weakness. Pt states he had similar chest pain ~1 month ago but notes chest pain at that time resolved. Patient admits to extensive family history of CAD. Patient denies SOB, palpitations, history of CAD, HF, or recent illness. Patient follows with cardiologist Dr. Posada.  Patient received Brilinta 180 mg x1 in Dr. Posada's office,   ED course- cardiology was called initially for STEMI alert. Upon review of EKG, pt does not meet STEMI criteria- , A fib RVR, LEON in aVR with ST depressions leads II, III, aVF, V3-V6. Patient with continued chest pressure. Pt s/p load with ASA/Heparin as per ACS protocol, 2.5 mg IVP Lopressor x1 and 25 mg PO Lopressor for rate control. Troponin 48. Pt initiated on nitro gtt for active chest pain.    INTERVAL HPI/OVERNIGHT EVENTS: Pt converted to NSR around 3am. Pt had LHC which showed 70% LAD occlusion, prox circx 100% and % occlusion. Radial band removed.   Pt started on isosobide 5mg TID and lopressor increased to 25mg BID. Nitro gtt titrated off and radial band removed at 1:50pm. Pt currently chest pain free post cath.   Pt pending transfer for [facility] for eval for CABG. PT HD stable for downgrade to [facility].       MEDICATIONS:  aspirin enteric coated 81 milliGRAM(s) Oral daily  atorvastatin 80 milliGRAM(s) Oral at bedtime  chlorhexidine 2% Cloths 1 Application(s) Topical daily  dextrose 5%. 1000 milliLiter(s) IV Continuous <Continuous>  dextrose 5%. 1000 milliLiter(s) IV Continuous <Continuous>  dextrose 50% Injectable 25 Gram(s) IV Push once  dextrose 50% Injectable 25 Gram(s) IV Push once  dextrose 50% Injectable 12.5 Gram(s) IV Push once  dextrose Oral Gel 15 Gram(s) Oral once PRN  famotidine    Tablet 20 milliGRAM(s) Oral daily  glucagon  Injectable 1 milliGRAM(s) IntraMuscular once  heparin  Infusion 1000 Unit(s)/Hr IV Continuous <Continuous>  insulin lispro (ADMELOG) corrective regimen sliding scale   SubCutaneous three times a day before meals  insulin lispro (ADMELOG) corrective regimen sliding scale   SubCutaneous at bedtime  isosorbide   dinitrate Tablet (ISORDIL) 5 milliGRAM(s) Oral three times a day  metoprolol tartrate 25 milliGRAM(s) Oral two times a day      T(C): 36.6 (07-12-23 @ 04:00), Max: 36.9 (07-11-23 @ 21:31)  HR: 76 (07-12-23 @ 14:00) (65 - 125)  BP: 105/77 (07-12-23 @ 14:00) (86/57 - 160/106)  RR: 18 (07-12-23 @ 14:00) (14 - 22)  SpO2: 97% (07-12-23 @ 14:00) (96% - 100%)  Wt(kg): --Vital Signs Last 24 Hrs  T(C): 36.6 (12 Jul 2023 04:00), Max: 36.9 (11 Jul 2023 21:31)  T(F): 97.8 (12 Jul 2023 04:00), Max: 98.4 (11 Jul 2023 21:31)  HR: 76 (12 Jul 2023 14:00) (65 - 125)  BP: 105/77 (12 Jul 2023 14:00) (86/57 - 160/106)  BP(mean): 87 (12 Jul 2023 14:00) (67 - 96)  RR: 18 (12 Jul 2023 14:00) (14 - 22)  SpO2: 97% (12 Jul 2023 14:00) (96% - 100%)    Parameters below as of 12 Jul 2023 14:00  Patient On (Oxygen Delivery Method): room air        PHYSICAL EXAM:  General: WN/WD NAD  HEENT: PERRLA, EOMI, moist mucous membranes  Neurology: A&Ox3, nonfocal, WOMACK x 4  Respiratory: CTA B/L, normal respiratory effort, no wheezes, crackles, rales  CV: NSR  Abdominal: Soft, NT, ND   Extremities: warm, No LE edema, + peripheral pulses    Consultant(s) Notes Reviewed:  [x ] YES  [ ] NO  Care Discussed with Consultants/Other Providers [ x] YES  [ ] NO    LABS:                        13.2   5.90  )-----------( 198      ( 12 Jul 2023 03:35 )             39.3     07-12    138  |  103  |  15  ----------------------------<  120<H>  3.8   |  22  |  0.97    Ca    9.6      12 Jul 2023 03:35  Phos  4.0     07-12  Mg     2.00     07-12    TPro  7.4  /  Alb  4.6  /  TBili  0.4  /  DBili  x   /  AST  19  /  ALT  16  /  AlkPhos  73  07-11    PT/INR - ( 12 Jul 2023 03:35 )   PT: 12.3 sec;   INR: 1.06 ratio         PTT - ( 12 Jul 2023 10:00 )  PTT:70.1 sec  Urinalysis Basic - ( 12 Jul 2023 03:35 )    Color: x / Appearance: x / SG: x / pH: x  Gluc: 120 mg/dL / Ketone: x  / Bili: x / Urobili: x   Blood: x / Protein: x / Nitrite: x   Leuk Esterase: x / RBC: x / WBC x   Sq Epi: x / Non Sq Epi: x / Bacteria: x      CAPILLARY BLOOD GLUCOSE      POCT Blood Glucose.: 103 mg/dL (12 Jul 2023 07:34)  POCT Blood Glucose.: 113 mg/dL (11 Jul 2023 22:20)  POCT Blood Glucose.: 131 mg/dL (11 Jul 2023 17:12)        Urinalysis Basic - ( 12 Jul 2023 03:35 )    Color: x / Appearance: x / SG: x / pH: x  Gluc: 120 mg/dL / Ketone: x  / Bili: x / Urobili: x   Blood: x / Protein: x / Nitrite: x   Leuk Esterase: x / RBC: x / WBC x   Sq Epi: x / Non Sq Epi: x / Bacteria: x        RADIOLOGY & ADDITIONAL TESTS:    Imaging Personally Reviewed:  [x ] YES  [ ] NO    63 year old male with PMHx HTN, T2DM on metformin, HLD, GERD who presents to [facility] ED after +stress test 7/11 at 2pm. Patient afterwards ~@1530 began complaining of severe chest pressure with radiation to L arm/shoulder and generalized weakness. Patient s/p Brilinta 180 mg x1 in cardiologist Dr. Posada's office, s/p load with ASA/Heparin as per ACS protocol in ED. Initial troponin 48, pro , normal BUN/Cr, TSH wnl. EKG significant for , A fib RVR, LEON in aVR with ST depressions leads II, III, aVF, V3-V6. Pt admitted to CCU for further management of NSTEMI and symptomatic treatment. PT now s/p diagnostic LHC w/triple vessel disease and stable for downgrade to [facility] and pending transfer to [facility] for CABG eval/work up.    To follow up:  -[facility] with triple vessel disease. cont hep gtt and ASA. hold brilinta for cabg eval   - cont lopressor 25 BID  -cont imdur 5 mg TID and uptirate for chest pain and afterload reduction  - cont high intensity statin  -pending transfer to [facility] for CABG eval and work up    case and plan discussed with Dr Sellers

## 2023-07-12 NOTE — DISCHARGE NOTE PROVIDER - HOSPITAL COURSE
Patient is 63 year old male with PMHx HTN, T2DM on metformin who presents to [facility] ED after +stress test 7/11 at 2pm. Patient afterwards ~@1530 began complaining of severe chest pressure with radiation to L arm/shoulder and generalized weakness. Pt states he had similar chest pain ~1 month ago but notes chest pain at that time resolved. Patient admits to extensive family history of CAD. Patient denies SOB, palpitations, history of CAD, HF, or recent illness. Patient follows with cardiologist Dr. Posada.  Patient received Brilinta 180 mg x1 in Dr. Posada's office,   ED course- cardiology was called initially for STEMI alert. Upon review of EKG, pt does not meet STEMI criteria- , A fib RVR, LEON in aVR with ST depressions leads II, III, aVF, V3-V6. Patient with continued chest pressure. Pt s/p load with ASA/Heparin as per ACS protocol, 2.5 mg IVP Lopressor x1 and 25 mg PO Lopressor for rate control. Troponin 48. Pt initiated on nitro gtt for active chest pain.    24 hr events: Pt converted to NSR around 3am. Pt had LHC which showed 70% LAD occlusion, prox circx 100% and % occlusion. Radial band removed.   Pt started on isosobide 5mg TID and lopressor increased to 25mg BID. Nitro gtt titrated off and radial band removed at 1:50pm. Pt currently chest pain free post cath.   Pt pending transfer for [facility] for eval for CABG. PT HD stable for downgrade to [facility].

## 2023-07-12 NOTE — DISCHARGE NOTE PROVIDER - NSDCMRMEDTOKEN_GEN_ALL_CORE_FT
amlodipine-olmesartan 10 mg-20 mg oral tablet: 1 orally once a day  cholecalciferol 1250 mcg (50,000 intl units) oral capsule: 1 orally once a week  famotidine 20 mg oral tablet: 1 orally once a day  Lipitor 20 mg oral tablet: 1 orally once a day  metFORMIN 500 mg oral tablet: 1 orally 2 times a day

## 2023-07-13 ENCOUNTER — INPATIENT (INPATIENT)
Facility: HOSPITAL | Age: 63
LOS: 9 days | Discharge: HOME HEALTH | DRG: 235 | End: 2023-07-23
Attending: THORACIC SURGERY (CARDIOTHORACIC VASCULAR SURGERY) | Admitting: THORACIC SURGERY (CARDIOTHORACIC VASCULAR SURGERY)
Payer: COMMERCIAL

## 2023-07-13 VITALS
RESPIRATION RATE: 18 BRPM | WEIGHT: 181 LBS | SYSTOLIC BLOOD PRESSURE: 127 MMHG | OXYGEN SATURATION: 97 % | DIASTOLIC BLOOD PRESSURE: 79 MMHG | HEART RATE: 75 BPM | TEMPERATURE: 98 F

## 2023-07-13 VITALS
SYSTOLIC BLOOD PRESSURE: 101 MMHG | DIASTOLIC BLOOD PRESSURE: 64 MMHG | HEART RATE: 83 BPM | RESPIRATION RATE: 19 BRPM | OXYGEN SATURATION: 98 %

## 2023-07-13 DIAGNOSIS — I25.10 ATHEROSCLEROTIC HEART DISEASE OF NATIVE CORONARY ARTERY WITHOUT ANGINA PECTORIS: ICD-10-CM

## 2023-07-13 PROBLEM — Z00.00 ENCOUNTER FOR PREVENTIVE HEALTH EXAMINATION: Status: ACTIVE | Noted: 2023-07-13

## 2023-07-13 PROBLEM — E78.5 HYPERLIPIDEMIA, UNSPECIFIED: Chronic | Status: ACTIVE | Noted: 2023-07-12

## 2023-07-13 PROBLEM — K21.9 GASTRO-ESOPHAGEAL REFLUX DISEASE WITHOUT ESOPHAGITIS: Chronic | Status: ACTIVE | Noted: 2023-07-12

## 2023-07-13 LAB
ANION GAP SERPL CALC-SCNC: 12 MMOL/L — SIGNIFICANT CHANGE UP (ref 7–14)
APTT BLD: 60.9 SEC — SIGNIFICANT CHANGE UP (ref 27–36.3)
APTT BLD: 64.9 SEC — HIGH (ref 27–36.3)
APTT BLD: 81.9 SEC — HIGH (ref 27.5–35.5)
BLD GP AB SCN SERPL QL: NEGATIVE — SIGNIFICANT CHANGE UP
BUN SERPL-MCNC: 16 MG/DL — SIGNIFICANT CHANGE UP (ref 7–23)
CALCIUM SERPL-MCNC: 9.5 MG/DL — SIGNIFICANT CHANGE UP (ref 8.4–10.5)
CHLORIDE SERPL-SCNC: 104 MMOL/L — SIGNIFICANT CHANGE UP (ref 98–107)
CK MB BLD-MCNC: SIGNIFICANT CHANGE UP % (ref 0–2.5)
CK MB CFR SERPL CALC: 1.2 NG/ML — SIGNIFICANT CHANGE UP
CK MB CFR SERPL CALC: 1.3 NG/ML — SIGNIFICANT CHANGE UP
CK SERPL-CCNC: 80 U/L — SIGNIFICANT CHANGE UP (ref 30–200)
CO2 SERPL-SCNC: 21 MMOL/L — LOW (ref 22–31)
CREAT SERPL-MCNC: 0.92 MG/DL — SIGNIFICANT CHANGE UP (ref 0.5–1.3)
EGFR: 93 ML/MIN/1.73M2 — SIGNIFICANT CHANGE UP
GLUCOSE BLDC GLUCOMTR-MCNC: 115 MG/DL — HIGH (ref 70–99)
GLUCOSE SERPL-MCNC: 139 MG/DL — HIGH (ref 70–99)
HCT VFR BLD CALC: 40.2 % — SIGNIFICANT CHANGE UP (ref 39–50)
HGB BLD-MCNC: 13.2 G/DL — SIGNIFICANT CHANGE UP (ref 13–17)
INR BLD: 1.07 RATIO — SIGNIFICANT CHANGE UP (ref 0.88–1.16)
MAGNESIUM SERPL-MCNC: 2 MG/DL — SIGNIFICANT CHANGE UP (ref 1.6–2.6)
MCHC RBC-ENTMCNC: 28.1 PG — SIGNIFICANT CHANGE UP (ref 27–34)
MCHC RBC-ENTMCNC: 32.8 GM/DL — SIGNIFICANT CHANGE UP (ref 32–36)
MCV RBC AUTO: 85.5 FL — SIGNIFICANT CHANGE UP (ref 80–100)
NRBC # BLD: 0 /100 WBCS — SIGNIFICANT CHANGE UP (ref 0–0)
NRBC # FLD: 0 K/UL — SIGNIFICANT CHANGE UP (ref 0–0)
NT-PROBNP SERPL-SCNC: 176 PG/ML — SIGNIFICANT CHANGE UP (ref 0–300)
PA ADP PRP-ACNC: 142 PRU — LOW (ref 194–418)
PHOSPHATE SERPL-MCNC: 4 MG/DL — SIGNIFICANT CHANGE UP (ref 2.5–4.5)
PLATELET # BLD AUTO: 181 K/UL — SIGNIFICANT CHANGE UP (ref 150–400)
POTASSIUM SERPL-MCNC: 4 MMOL/L — SIGNIFICANT CHANGE UP (ref 3.5–5.3)
POTASSIUM SERPL-SCNC: 4 MMOL/L — SIGNIFICANT CHANGE UP (ref 3.5–5.3)
PROTHROM AB SERPL-ACNC: 12.4 SEC — SIGNIFICANT CHANGE UP (ref 10.5–13.4)
RBC # BLD: 4.7 M/UL — SIGNIFICANT CHANGE UP (ref 4.2–5.8)
RBC # FLD: 12.2 % — SIGNIFICANT CHANGE UP (ref 10.3–14.5)
RH IG SCN BLD-IMP: POSITIVE — SIGNIFICANT CHANGE UP
SODIUM SERPL-SCNC: 137 MMOL/L — SIGNIFICANT CHANGE UP (ref 135–145)
TROPONIN T, HIGH SENSITIVITY RESULT: 25 NG/L — SIGNIFICANT CHANGE UP
UFH PPP CHRO-ACNC: 0.51 IU/ML — SIGNIFICANT CHANGE UP (ref 0.3–0.7)
WBC # BLD: 5.02 K/UL — SIGNIFICANT CHANGE UP (ref 3.8–10.5)
WBC # FLD AUTO: 5.02 K/UL — SIGNIFICANT CHANGE UP (ref 3.8–10.5)

## 2023-07-13 RX ORDER — INSULIN LISPRO 100/ML
VIAL (ML) SUBCUTANEOUS AT BEDTIME
Refills: 0 | Status: DISCONTINUED | OUTPATIENT
Start: 2023-07-13 | End: 2023-07-17

## 2023-07-13 RX ORDER — DEXTROSE 50 % IN WATER 50 %
25 SYRINGE (ML) INTRAVENOUS ONCE
Refills: 0 | Status: DISCONTINUED | OUTPATIENT
Start: 2023-07-13 | End: 2023-07-17

## 2023-07-13 RX ORDER — AMLODIPINE BESYLATE 2.5 MG/1
10 TABLET ORAL DAILY
Refills: 0 | Status: DISCONTINUED | OUTPATIENT
Start: 2023-07-13 | End: 2023-07-17

## 2023-07-13 RX ORDER — DEXTROSE 50 % IN WATER 50 %
12.5 SYRINGE (ML) INTRAVENOUS ONCE
Refills: 0 | Status: DISCONTINUED | OUTPATIENT
Start: 2023-07-13 | End: 2023-07-17

## 2023-07-13 RX ORDER — HEPARIN SODIUM 5000 [USP'U]/ML
1200 INJECTION INTRAVENOUS; SUBCUTANEOUS
Qty: 25000 | Refills: 0 | Status: DISCONTINUED | OUTPATIENT
Start: 2023-07-13 | End: 2023-07-17

## 2023-07-13 RX ORDER — FAMOTIDINE 10 MG/ML
20 INJECTION INTRAVENOUS DAILY
Refills: 0 | Status: DISCONTINUED | OUTPATIENT
Start: 2023-07-13 | End: 2023-07-17

## 2023-07-13 RX ORDER — SODIUM CHLORIDE 9 MG/ML
1000 INJECTION, SOLUTION INTRAVENOUS
Refills: 0 | Status: DISCONTINUED | OUTPATIENT
Start: 2023-07-13 | End: 2023-07-17

## 2023-07-13 RX ORDER — MUPIROCIN 20 MG/G
1 OINTMENT TOPICAL EVERY 12 HOURS
Refills: 0 | Status: DISCONTINUED | OUTPATIENT
Start: 2023-07-13 | End: 2023-07-14

## 2023-07-13 RX ORDER — METOPROLOL TARTRATE 50 MG
12.5 TABLET ORAL EVERY 12 HOURS
Refills: 0 | Status: DISCONTINUED | OUTPATIENT
Start: 2023-07-13 | End: 2023-07-17

## 2023-07-13 RX ORDER — SODIUM CHLORIDE 9 MG/ML
3 INJECTION INTRAMUSCULAR; INTRAVENOUS; SUBCUTANEOUS EVERY 8 HOURS
Refills: 0 | Status: DISCONTINUED | OUTPATIENT
Start: 2023-07-13 | End: 2023-07-17

## 2023-07-13 RX ORDER — DEXTROSE 50 % IN WATER 50 %
15 SYRINGE (ML) INTRAVENOUS ONCE
Refills: 0 | Status: DISCONTINUED | OUTPATIENT
Start: 2023-07-13 | End: 2023-07-17

## 2023-07-13 RX ORDER — ATORVASTATIN CALCIUM 80 MG/1
20 TABLET, FILM COATED ORAL AT BEDTIME
Refills: 0 | Status: DISCONTINUED | OUTPATIENT
Start: 2023-07-13 | End: 2023-07-17

## 2023-07-13 RX ORDER — INSULIN LISPRO 100/ML
VIAL (ML) SUBCUTANEOUS
Refills: 0 | Status: DISCONTINUED | OUTPATIENT
Start: 2023-07-13 | End: 2023-07-17

## 2023-07-13 RX ORDER — GLUCAGON INJECTION, SOLUTION 0.5 MG/.1ML
1 INJECTION, SOLUTION SUBCUTANEOUS ONCE
Refills: 0 | Status: DISCONTINUED | OUTPATIENT
Start: 2023-07-13 | End: 2023-07-17

## 2023-07-13 RX ADMIN — ISOSORBIDE DINITRATE 5 MILLIGRAM(S): 5 TABLET ORAL at 05:37

## 2023-07-13 RX ADMIN — Medication 25 MILLIGRAM(S): at 05:36

## 2023-07-13 RX ADMIN — Medication 25 MILLIGRAM(S): at 17:34

## 2023-07-13 RX ADMIN — FAMOTIDINE 20 MILLIGRAM(S): 10 INJECTION INTRAVENOUS at 11:33

## 2023-07-13 RX ADMIN — ISOSORBIDE DINITRATE 5 MILLIGRAM(S): 5 TABLET ORAL at 11:33

## 2023-07-13 RX ADMIN — Medication 81 MILLIGRAM(S): at 11:33

## 2023-07-13 RX ADMIN — ISOSORBIDE DINITRATE 5 MILLIGRAM(S): 5 TABLET ORAL at 17:34

## 2023-07-13 RX ADMIN — CHLORHEXIDINE GLUCONATE 1 APPLICATION(S): 213 SOLUTION TOPICAL at 11:34

## 2023-07-13 NOTE — H&P ADULT - ASSESSMENT
Patient is 63 year old male with PMHx HTN, T2DM on metformin who presents to [facility] ED after +stress test 7/11 at 2pm. Patient afterwards ~@1530 began complaining of severe chest pressure with radiation to L arm/shoulder and generalized weakness. Pt states he had similar chest pain ~1 month ago but notes chest pain at that time resolved. Patient admits to extensive family history of CAD. Patient denies SOB, palpitations, history of CAD, HF, or recent illness. Patient follows with cardiologist Dr. Posada.  Patient received Brilinta 180 mg x1 in Dr. Posada's office,   ED course- cardiology was called initially for STEMI alert. Upon review of EKG, pt does not meet STEMI criteria- , A fib RVR, LEON in aVR with ST depressions leads II, III, aVF, V3-V6. Patient with continued chest pressure. Pt s/p load with ASA/Heparin as per ACS protocol, 2.5 mg IVP Lopressor x1 and 25 mg PO Lopressor for rate control. Troponin 48.  7/12 patient had Cardiac Cath which revealed 3VCAD  7/13 Tx to [facility] for CABG on 7/14 by Dr. Barron. Of note P2Y12 was 142.

## 2023-07-13 NOTE — H&P ADULT - HISTORY OF PRESENT ILLNESS
Patient is 63 year old male with PMHx HTN, T2DM on metformin who presents to [facility] ED after +stress test 7/11 at 2pm. Patient afterwards ~@1530 began complaining of severe chest pressure with radiation to L arm/shoulder and generalized weakness. Pt states he had similar chest pain ~1 month ago but notes chest pain at that time resolved. Patient admits to extensive family history of CAD. Patient denies SOB, palpitations, history of CAD, HF, or recent illness. Patient follows with cardiologist Dr. Posada.  Patient received Brilinta 180 mg x1 in Dr. Posada's office,   ED course- cardiology was called initially for STEMI alert. Upon review of EKG, pt does not meet STEMI criteria- , A fib RVR, LEON in aVR with ST depressions leads II, III, aVF, V3-V6. Patient with continued chest pressure. Pt s/p load with ASA/Heparin as per ACS protocol, 2.5 mg IVP Lopressor x1 and 25 mg PO Lopressor for rate control. Troponin 48.  7/12 patient had Cardiac Cath which revealed 3VCAD. Now for Tx to [facility] for Cardiac Surgery Evaluation.

## 2023-07-13 NOTE — PROGRESS NOTE ADULT - SUBJECTIVE AND OBJECTIVE BOX
Patient is a 63y old  Male who presents with a chief complaint of NSTEMI  (2023 15:22)    HPI:  Patient is 63 year old male with PMHx HTN, T2DM on metformin who presents to [facility] ED after +stress test  at 2pm. Patient afterwards ~@1530 began complaining of severe chest pressure with radiation to L arm/shoulder and generalized weakness. Pt states he had similar chest pain ~1 month ago but notes chest pain at that time resolved. Patient admits to extensive family history of CAD. Patient denies SOB, palpitations, history of CAD, HF, or recent illness. Patient follows with cardiologist Dr. Posada.  Patient received Brilinta 180 mg x1 in Dr. Posada's office,   ED course- cardiology was called initially for STEMI alert. Upon review of EKG, pt does not meet STEMI criteria- , A fib RVR, LEON in aVR with ST depressions leads II, III, aVF, V3-V6. Patient with continued chest pressure. Pt s/p load with ASA/Heparin as per ACS protocol, 2.5 mg IVP Lopressor x1 and 25 mg PO Lopressor for rate control. Troponin 48.   (2023 22:56)       INTERVAL HPI/OVERNIGHT EVENTS:   - No overnight events   - Afebrile, hemodynamically stable   - Pending Tele bed for transfer to [facility] for CABG    Subjective:    ICU Vital Signs Last 24 Hrs  T(C): 36.8 (2023 04:00), Max: 36.8 (2023 04:00)  T(F): 98.3 (2023 04:00), Max: 98.3 (2023 04:00)  HR: 72 (2023 07:00) (69 - 82)  BP: 108/89 (2023 07:00) (93/59 - 135/51)  BP(mean): 95 (2023 07:00) (66 - 95)  ABP: --  ABP(mean): --  RR: 17 (2023 07:00) (14 - 20)  SpO2: 97% (2023 07:00) (94% - 100%)    O2 Parameters below as of 2023 07:00  Patient On (Oxygen Delivery Method): room air      I&O's Summary    2023 07:01  -  2023 07:00  --------------------------------------------------------  IN: 859 mL / OUT: 1600 mL / NET: -741 mL          Daily     Daily Weight in k.9 (2023 06:00)      EKG/Telemetry Events:  NSR    MEDICATIONS  (STANDING):  aspirin enteric coated 81 milliGRAM(s) Oral daily  atorvastatin 80 milliGRAM(s) Oral at bedtime  chlorhexidine 2% Cloths 1 Application(s) Topical daily  dextrose 5%. 1000 milliLiter(s) (50 mL/Hr) IV Continuous <Continuous>  dextrose 5%. 1000 milliLiter(s) (100 mL/Hr) IV Continuous <Continuous>  dextrose 50% Injectable 25 Gram(s) IV Push once  dextrose 50% Injectable 25 Gram(s) IV Push once  dextrose 50% Injectable 12.5 Gram(s) IV Push once  famotidine    Tablet 20 milliGRAM(s) Oral daily  glucagon  Injectable 1 milliGRAM(s) IntraMuscular once  heparin  Infusion 1000 Unit(s)/Hr (12 mL/Hr) IV Continuous <Continuous>  insulin lispro (ADMELOG) corrective regimen sliding scale   SubCutaneous three times a day before meals  insulin lispro (ADMELOG) corrective regimen sliding scale   SubCutaneous at bedtime  isosorbide   dinitrate Tablet (ISORDIL) 5 milliGRAM(s) Oral three times a day  metoprolol tartrate 25 milliGRAM(s) Oral two times a day    MEDICATIONS  (PRN):  dextrose Oral Gel 15 Gram(s) Oral once PRN Blood Glucose LESS THAN 70 milliGRAM(s)/deciliter      PHYSICAL EXAM:      LABS:                        13.2   5.02  )-----------( 181      ( 2023 00:55 )             40.2     07-13    137  |  104  |  16  ----------------------------<  139<H>  4.0   |  21<L>  |  0.92    Ca    9.5      2023 00:55  Phos  4.0     07-13  Mg     2.00     07-13    TPro  7.4  /  Alb  4.6  /  TBili  0.4  /  DBili  x   /  AST  19  /  ALT  16  /  AlkPhos  73  07-11    LIVER FUNCTIONS - ( 2023 21:55 )  Alb: 4.6 g/dL / Pro: 7.4 g/dL / ALK PHOS: 73 U/L / ALT: 16 U/L / AST: 19 U/L / GGT: x           PT/INR - ( 2023 03:35 )   PT: 12.3 sec;   INR: 1.06 ratio         PTT - ( 2023 06:50 )  PTT:64.9 sec  CAPILLARY BLOOD GLUCOSE      POCT Blood Glucose.: 116 mg/dL (2023 07:33)  POCT Blood Glucose.: 117 mg/dL (2023 21:21)  POCT Blood Glucose.: 97 mg/dL (2023 17:29)      CKMB Units: 1.2 ng/mL ( @ 06:50)  CKMB Units: 1.3 ng/mL ( @ 00:55)  Creatine Kinase, Serum: 80 U/L ( @ 00:55)  CKMB Units: 1.7 ng/mL ( @ 17:15)  Creatine Kinase, Serum: 88 U/L ( @ 17:15)  Creatine Kinase, Serum: 97 U/L ( @ 10:00)  CKMB Units: 2.0 ng/mL ( @ 10:00)    CARDIAC MARKERS ( 2023 06:50 )  x     / x     / x     / x     / 1.2 ng/mL  CARDIAC MARKERS ( 2023 00:55 )  x     / x     / 80 U/L / x     / 1.3 ng/mL  CARDIAC MARKERS ( 2023 17:15 )  x     / x     / 88 U/L / x     / 1.7 ng/mL  CARDIAC MARKERS ( 2023 10:00 )  x     / x     / 97 U/L / x     / 2.0 ng/mL  CARDIAC MARKERS ( 2023 03:35 )  x     / x     / 109 U/L / x     / 2.0 ng/mL      Urinalysis Basic - ( 2023 00:55 )    Color: x / Appearance: x / SG: x / pH: x  Gluc: 139 mg/dL / Ketone: x  / Bili: x / Urobili: x   Blood: x / Protein: x / Nitrite: x   Leuk Esterase: x / RBC: x / WBC x   Sq Epi: x / Non Sq Epi: x / Bacteria: x          RADIOLOGY & ADDITIONAL TESTS:  CXR:        Care Discussed with Consultants/Other Providers [  ] YES  [ ] NO       Patient is a 63y old  Male who presents with a chief complaint of NSTEMI  (2023 15:22)    HPI:  Patient is 63 year old male with PMHx HTN, T2DM on metformin who presents to [facility] ED after +stress test  at 2pm. Patient afterwards ~@1530 began complaining of severe chest pressure with radiation to L arm/shoulder and generalized weakness. Pt states he had similar chest pain ~1 month ago but notes chest pain at that time resolved. Patient admits to extensive family history of CAD. Patient denies SOB, palpitations, history of CAD, HF, or recent illness. Patient follows with cardiologist Dr. Posada.  Patient received Brilinta 180 mg x1 in Dr. Posada's office,   ED course- cardiology was called initially for STEMI alert. Upon review of EKG, pt does not meet STEMI criteria- , A fib RVR, LEON in aVR with ST depressions leads II, III, aVF, V3-V6. Patient with continued chest pressure. Pt s/p load with ASA/Heparin as per ACS protocol, 2.5 mg IVP Lopressor x1 and 25 mg PO Lopressor for rate control. Troponin 48.   (2023 22:56)       INTERVAL HPI/OVERNIGHT EVENTS:   - No overnight events. Afebrile, hemodynamically stable   - Pt assessed at bedside, is A&OX3, in nad. Pt denies any chest pain, palpitations or dizziness. Pending Tele bed for transfer to [facility] for CABG.    Subjective:    ICU Vital Signs Last 24 Hrs  T(C): 36.8 (2023 04:00), Max: 36.8 (2023 04:00)  T(F): 98.3 (2023 04:00), Max: 98.3 (2023 04:00)  HR: 72 (2023 07:00) (69 - 82)  BP: 108/89 (2023 07:00) (93/59 - 135/51)  BP(mean): 95 (2023 07:00) (66 - 95)  ABP: --  ABP(mean): --  RR: 17 (2023 07:00) (14 - 20)  SpO2: 97% (2023 07:00) (94% - 100%)    O2 Parameters below as of 2023 07:00  Patient On (Oxygen Delivery Method): room air    I&O's Summary    2023 07:01  -  2023 07:00  --------------------------------------------------------  IN: 859 mL / OUT: 1600 mL / NET: -741 mL      Daily     Daily Weight in k.9 (2023 06:00)      EKG/Telemetry Events:  NSR    MEDICATIONS  (STANDING):  aspirin enteric coated 81 milliGRAM(s) Oral daily  atorvastatin 80 milliGRAM(s) Oral at bedtime  chlorhexidine 2% Cloths 1 Application(s) Topical daily  dextrose 5%. 1000 milliLiter(s) (50 mL/Hr) IV Continuous <Continuous>  dextrose 5%. 1000 milliLiter(s) (100 mL/Hr) IV Continuous <Continuous>  dextrose 50% Injectable 25 Gram(s) IV Push once  dextrose 50% Injectable 25 Gram(s) IV Push once  dextrose 50% Injectable 12.5 Gram(s) IV Push once  famotidine    Tablet 20 milliGRAM(s) Oral daily  glucagon  Injectable 1 milliGRAM(s) IntraMuscular once  heparin  Infusion 1000 Unit(s)/Hr (12 mL/Hr) IV Continuous <Continuous>  insulin lispro (ADMELOG) corrective regimen sliding scale   SubCutaneous three times a day before meals  insulin lispro (ADMELOG) corrective regimen sliding scale   SubCutaneous at bedtime  isosorbide   dinitrate Tablet (ISORDIL) 5 milliGRAM(s) Oral three times a day  metoprolol tartrate 25 milliGRAM(s) Oral two times a day    MEDICATIONS  (PRN):  dextrose Oral Gel 15 Gram(s) Oral once PRN Blood Glucose LESS THAN 70 milliGRAM(s)/deciliter      PHYSICAL EXAM:  GENERAL: NAD, well-groomed, well-developed  HEAD: Atraumatic, Normocephalic  EYES: EOMI, PERRL, conjunctiva and sclera clear  ENMT: No oropharyngeal exudates, erythema or lesions,  Moist mucous membranes  NECK: Supple, no cervical lymphadenopathy, no JVD  NERVOUS SYSTEM: Alert & Oriented X3, CN II-XII intact, Moves all extremities. Full sensation to light touch   CHEST/LUNG: Lung sounds clear to auscultation bilaterally.  HEART: S1/S2 without murmurs, without rubs, or gallops. POCUS  ABDOMEN:   Soft, Nontender, Nondistended; Bowel sounds present, Bladder non distended, non palpable  EXTREMITIES:   Pulses palpable radial pulses 2+ bilat, DP/PT 1+/1+ bilat, without clubbing, cyanosis. Digits warm to touch with good cap refill < 3 secs  SKIN:   warm, dry, intact, normal color, no rash or abnormal lesions, without palpable nodes      LABS:                        13.2   5.02  )-----------( 181      ( 2023 00:55 )             40.2         137  |  104  |  16  ----------------------------<  139<H>  4.0   |  21<L>  |  0.92    Ca    9.5      2023 00:55  Phos  4.0       Mg     2.00         TPro  7.4  /  Alb  4.6  /  TBili  0.4  /  DBili  x   /  AST  19  /  ALT  16  /  AlkPhos  73  07-11    LIVER FUNCTIONS - ( 2023 21:55 )  Alb: 4.6 g/dL / Pro: 7.4 g/dL / ALK PHOS: 73 U/L / ALT: 16 U/L / AST: 19 U/L / GGT: x           PT/INR - ( 2023 03:35 )   PT: 12.3 sec;   INR: 1.06 ratio         PTT - ( 2023 06:50 )  PTT:64.9 sec  CAPILLARY BLOOD GLUCOSE      POCT Blood Glucose.: 116 mg/dL (2023 07:33)  POCT Blood Glucose.: 117 mg/dL (2023 21:21)  POCT Blood Glucose.: 97 mg/dL (2023 17:29)      CKMB Units: 1.2 ng/mL ( @ 06:50)  CKMB Units: 1.3 ng/mL ( @ 00:55)  Creatine Kinase, Serum: 80 U/L ( @ 00:55)  CKMB Units: 1.7 ng/mL ( @ 17:15)  Creatine Kinase, Serum: 88 U/L ( @ 17:15)  Creatine Kinase, Serum: 97 U/L ( @ 10:00)  CKMB Units: 2.0 ng/mL ( @ 10:00)    CARDIAC MARKERS ( 2023 06:50 )  x     / x     / x     / x     / 1.2 ng/mL  CARDIAC MARKERS ( 2023 00:55 )  x     / x     / 80 U/L / x     / 1.3 ng/mL  CARDIAC MARKERS ( 2023 17:15 )  x     / x     / 88 U/L / x     / 1.7 ng/mL  CARDIAC MARKERS ( 2023 10:00 )  x     / x     / 97 U/L / x     / 2.0 ng/mL  CARDIAC MARKERS ( 2023 03:35 )  x     / x     / 109 U/L / x     / 2.0 ng/mL      Urinalysis Basic - ( 2023 00:55 )    Color: x / Appearance: x / SG: x / pH: x  Gluc: 139 mg/dL / Ketone: x  / Bili: x / Urobili: x   Blood: x / Protein: x / Nitrite: x   Leuk Esterase: x / RBC: x / WBC x   Sq Epi: x / Non Sq Epi: x / Bacteria: x          RADIOLOGY & ADDITIONAL TESTS:  CXR:        Care Discussed with Consultants/Other Providers [  ] YES  [ ] NO

## 2023-07-13 NOTE — H&P ADULT - NSICDXPASTMEDICALHX_GEN_ALL_CORE_FT
PAST MEDICAL HISTORY:  Diabetes mellitus     GERD (gastroesophageal reflux disease)     HTN (hypertension)     Hyperlipidemia

## 2023-07-13 NOTE — CHART NOTE - NSCHARTNOTEFT_GEN_A_CORE
Consult received for "MST Score >2."   Upon chart review, patient does not currently meet criteria for MST.   RD remains available for assessment per protocol or as needed.    Ginger Garcia RDN, CDN #[id number]  Also available on [employer] Teams

## 2023-07-13 NOTE — H&P ADULT - PROBLEM SELECTOR PLAN 1
Pre op Cardiac surgery work up in progress   c/w ASA 81, started Lopressor, Atorvastatin qHS,   NPO after midnight   Type and Screen UTD x2  Heparin gtt on call to OR for ACS   Plan for Cardiac Surgery in ? tomorrow 7/14 with Dr. Barron (P2Y12 only 142 will recheck in am)  Dr. Barron to review labs and imaging

## 2023-07-13 NOTE — PROGRESS NOTE ADULT - ASSESSMENT
63 year old male with PMHx HTN, T2DM on metformin, HLD, GERD who presents to [facility] ED after +stress test 7/11 at 2pm. Patient afterwards ~@1530 began complaining of severe chest pressure with radiation to L arm/shoulder and generalized weakness. Patient s/p Brilinta 180 mg x1 in cardiologist Dr. Posada's office, s/p load with ASA/Heparin as per ACS protocol in ED. Initial troponin 48, pro , normal BUN/Cr, TSH wnl. EKG significant for , A fib RVR, LEON in aVR with ST depressions leads II, III, aVF, V3-V6. Pt admitted to CCU for further management of NSTEMI and symptomatic treatment.    NEUROLOGIC:  #AOx4, no active issues    RESPIRATORY:  -was wearing NC for comfort  -satting well on RA  -cxr wnl    CARDIOVASCULAR:  #NSTEMI  - Pt s/p Brilinta load. cont maintenance dosing   - maintain on daily ASA, Brilinta and lipitor   - Troponin 48-->59  - Trend troponin, CK, CK-MB, CPK to peak  - BRITTNEY score 2, ZEYAD score 120  - EKG significant for ST depressions in leads II, III, aVF, V3-V6, A fib RVR, LEON in aVR now resolved on AM EKG  - cont nitro gtt for active chest pain and titrate for chest pain    - F/U TTE in AM for further assessment of LV function  - proBNP 169  - pending [facility]    #Atrial fibrillation with RVR  - CHADsVASc 2  - Pt s/p 2.5 mg IVP x1 Lopressor   - converted to NSR ~3am  - on Lopressor 12.5 mg BID  - A/C as below     #HTN  - Hold home amlodipine-olmesartan     #HLD  -Lipitor 80 mg daily  -f/u lipid panel     GASTROINTESTINAL:  #H/O GERD  - Continue home Famotidine  -abd exam benign   - NPO except meds    /RENAL:  #BUN/Cr wnl 15/0.98  -voiding freely  -replete lytes as appropriate     ENDOCRINE:  #H/O T2DM  - A1C 8 on metformin  - FS controlled on ISS  -maintain gluc<180   -tsh wnl     ID:  #Afebrile without leukocytosis, no active issues    Heme:  - cbc stable, coags stable   - Heparin gtt full AC  -when post cath, will reassess for need for A/C for parox afib     case and plan discussed with Dr Sellers  63 year old male with PMHx HTN, T2DM on metformin, HLD, GERD who presents to [facility] ED after +stress test 7/11 at 2pm. Patient afterwards ~@1530 began complaining of severe chest pressure with radiation to L arm/shoulder and generalized weakness. Patient s/p Brilinta 180 mg x1 in cardiologist Dr. Posada's office, s/p load with ASA/Heparin as per ACS protocol in ED. Initial troponin 48, pro , normal BUN/Cr, TSH wnl. EKG significant for , A fib RVR, LEON in aVR with ST depressions leads II, III, aVF, V3-V6. Pt admitted to CCU for further management of NSTEMI and symptomatic treatment.    NEUROLOGIC:  #AOx4, no active issues    RESPIRATORY:  -was wearing NC for comfort  -satting well on RA  -cxr wnl    CARDIOVASCULAR:  #NSTEMI   #CAD with triple vessel disease  - EKG significant for ST depressions in leads II, III, aVF, V3-V6, A fib RVR, LEON in aVR now resolved on AM EKG  - cont nitro gtt for active chest pain and titrate for chest pain    - Pt s/p Brilinta load. cont maintenance dosing   - maintain on daily ASA, Brilinta and lipitor   - Troponin 59 > 25, CKMB 1.2, CPK 1.9  - BRITTNEY score 2, ZEYAD score 120  - 7/12 Echo- EF 40-45%, mild diastolic dysfunction, akinesis of the basal-midanterolateral wall, anterior  wall, inferolateral wall. Remaining walls are hypokinetic. Normal LV internal dimensions and wall thickness.   - 7/12 LHC: HC which showed 70% LAD occlusion, prox circx 100% and % occlusion.   - Pending tele bed availability for transfer to [facility] for CABG eval.     #Atrial fibrillation with RVR  - CHADsVASc 2  - Pt s/p 2.5 mg IVP x1 Lopressor   - converted to NSR ~3am  - on Lopressor 12.5 mg BID  - A/C as below     #HTN  - Hold home amlodipine-olmesartan     #HLD  -Lipitor 80 mg daily  -Total Cholesterol 116, HDL 48, LDL 54    GASTROINTESTINAL:  #H/O GERD  - Continue home Famotidine  -abd exam benign   - NPO except meds    /RENAL:  #BUN/Cr wnl 15/0.98  -voiding freely  -replete lytes as appropriate     ENDOCRINE:  #H/O T2DM  - A1C 8 on metformin  - FS controlled on ISS  -maintain gluc<180   -tsh wnl     ID:  #Afebrile without leukocytosis, no active issues    Heme:  - cbc stable, coags stable   - Heparin gtt full AC  - when post cath, will reassess for need for A/C for parox afib     case and plan discussed with Dr Sellers

## 2023-07-13 NOTE — H&P ADULT - NSHPLABSRESULTS_GEN_ALL_CORE
13.2   5.02  )-----------( 181      ( 13 Jul 2023 00:55 )             40.2   07-13    137  |  104  |  16  ----------------------------<  139<H>  4.0   |  21<L>  |  0.92    Ca    9.5      13 Jul 2023 00:55  Phos  4.0     07-13  Mg     2.00     07-13    PT/INR - ( 13 Jul 2023 22:55 )   PT: 12.4 sec;   INR: 1.07 ratio         PTT - ( 13 Jul 2023 22:55 )  PTT:81.9 sec  P2Y12 Plt Response Test . (07.13.23 @ 16:00)   P2Y12 Plt Reactivity: 142 PRU    Cardiac Cath 7/12:  Coronary Angiography   The coronary circulation is right dominant. Cardiac catheterization  was performed urgently.    LM   Ostial left main: There is a 20 % stenosis.      LAD   Proximal left anterior descending: The segment is moderately  calcified. There is a 70 % stenosis in the distal third portion of  the segment. BRITTNEY Flow 3.   Mid left anterior descending: There is a 50 % stenosis in the middle  third portion of the segment. BRITTNEY Flow 3.  Second diagonal: The segment is small. There is a 50 % stenosis in the  middle third portion of the segment. BRITTNEY Flow 3. Third  diagonal: The segment is small. There is a 60 % stenosis in the  proximal third portion of the segment. BRITTNEY Flow 3.    CX   Proximal circumflex: The segment is mildly calcified. The distal  vessel is supplied by faint collaterals from the Third diagonal.  There is a 100 % stenosis in the proximal third portion of the  segment. BRITTNEY Flow 0. This lesion is a chronic total occlusion.    RCA   Proximal right coronary artery: The vessel segment is supplied by  moderate bridging collaterals. The distalvessel is supplied  by moderate collaterals from the LAD septal  segments. There  is a 100 % stenosis in the middle third portion of the  segment. BRITTNEY Flow 0. This lesion is a chronic total occlusion.      Left Heart Cath   Ejection fraction is 60%.      TTE:  7/12:  CONCLUSIONS:  1. Normal mitral valve. Minimal mitral regurgitation.  2. Normal left ventricular internal dimensions and wall  thicknesses.  3. Mild segmental left ventricular systolic dysfunction.  Endocardial visualization enhanced with intravenous  injection of echo contrast (Definity). There is akinesis of  the basal-mid anterolateral wall, anterior wall,  inferolateral wall. The remaining walls are hypokinetic.  The LVEF visually is 40-45%.  4. Mild diastolic dysfunction (Stage I).  5. Normal right atrium.  6. Normal right ventricular size and function

## 2023-07-13 NOTE — H&P ADULT - NSHPPHYSICALEXAM_GEN_ALL_CORE
General: NAD  HEENT:  NC/AT  Neuro: A&Ox4, gait steady, speech clear, no focal deficits noted  Respiratory: BS CTA b/l, no wheezes, rales or rhonchi noted  Cardiovascular: RRR, (+) S1/S2, no murmur appreciated  GI: Abd soft, NT/ND, (+) BSx4Q (+) BM  Peripheral Vascular:  no LE edema b/l, 2+ peripheral pulses b/l, no varicosities/PVD noted  Musculoskeletal: B/L UE and LE 5/5 strength   Psychiatric: Normal mood, normal affect observed  Skin: Normal exam to inspection and palpation. no bleeding, no hematoma. Patient does have a lipoma on his right chest 2-3 cm from midsternum

## 2023-07-13 NOTE — H&P ADULT - NSHPREVIEWOFSYSTEMS_GEN_ALL_CORE
REVIEW OF SYSTEMS:  CONSTITUTIONAL: Denies fever, weight loss or fatigue  EYES: Denies eye pain, visual disturbances, or discharge  ENMT:  Denies difficulty hearing, tinnitus, vertigo, sinus or throat pain  NECK: Denies pain or stiffness  RESPIRATORY: Denies cough, wheezing, chills, hemoptysis or shortness of breath  CARDIOVASCULAR: Currently denies chest pain, palpitations, dizziness, or leg swelling, has not had CP since in the MD office on 7/11  GASTROINTESTINAL: Denies abdominal or epigastric pain, nausea, vomiting, hematemesis, diarrhea or melena  GENITOURINARY: Denies dysuria, frequency, hematuria, or incontinence  NEUROLOGICAL: Denies headaches, memory loss, loss of strength, numbness or tremors  SKIN: Denies itching, burning, rashes, or lesions   LYMPH NODES: Denies enlarged glands  ENDOCRINE: Denies heat or cold intolerance or hair loss  MUSCULOSKELETAL: Denies joint pain or swelling, muscle, back or extremity pain  PSYCHIATRIC: Denies depression, anxiety, mood swings or difficulty sleeping  HEME/LYMPH: Denies easy bruising or bleeding gums  ALLERGY: Denies hives or eczema

## 2023-07-14 DIAGNOSIS — E78.5 HYPERLIPIDEMIA, UNSPECIFIED: ICD-10-CM

## 2023-07-14 DIAGNOSIS — E11.9 TYPE 2 DIABETES MELLITUS WITHOUT COMPLICATIONS: ICD-10-CM

## 2023-07-14 DIAGNOSIS — I25.10 ATHEROSCLEROTIC HEART DISEASE OF NATIVE CORONARY ARTERY WITHOUT ANGINA PECTORIS: ICD-10-CM

## 2023-07-14 DIAGNOSIS — I10 ESSENTIAL (PRIMARY) HYPERTENSION: ICD-10-CM

## 2023-07-14 LAB
ALBUMIN SERPL ELPH-MCNC: 4.1 G/DL — SIGNIFICANT CHANGE UP (ref 3.3–5)
ALP SERPL-CCNC: 72 U/L — SIGNIFICANT CHANGE UP (ref 40–120)
ALT FLD-CCNC: 17 U/L — SIGNIFICANT CHANGE UP (ref 10–45)
ANION GAP SERPL CALC-SCNC: 15 MMOL/L — SIGNIFICANT CHANGE UP (ref 5–17)
APPEARANCE UR: CLEAR — SIGNIFICANT CHANGE UP
APTT BLD: 94 SEC — HIGH (ref 27.5–35.5)
AST SERPL-CCNC: 17 U/L — SIGNIFICANT CHANGE UP (ref 10–40)
BILIRUB SERPL-MCNC: 0.3 MG/DL — SIGNIFICANT CHANGE UP (ref 0.2–1.2)
BILIRUB UR-MCNC: NEGATIVE — SIGNIFICANT CHANGE UP
BUN SERPL-MCNC: 11 MG/DL — SIGNIFICANT CHANGE UP (ref 7–23)
CALCIUM SERPL-MCNC: 9.5 MG/DL — SIGNIFICANT CHANGE UP (ref 8.4–10.5)
CHLORIDE SERPL-SCNC: 103 MMOL/L — SIGNIFICANT CHANGE UP (ref 96–108)
CO2 SERPL-SCNC: 21 MMOL/L — LOW (ref 22–31)
COLOR SPEC: SIGNIFICANT CHANGE UP
CREAT SERPL-MCNC: 0.95 MG/DL — SIGNIFICANT CHANGE UP (ref 0.5–1.3)
DIFF PNL FLD: ABNORMAL
EGFR: 90 ML/MIN/1.73M2 — SIGNIFICANT CHANGE UP
GLUCOSE BLDC GLUCOMTR-MCNC: 111 MG/DL — HIGH (ref 70–99)
GLUCOSE BLDC GLUCOMTR-MCNC: 114 MG/DL — HIGH (ref 70–99)
GLUCOSE BLDC GLUCOMTR-MCNC: 118 MG/DL — HIGH (ref 70–99)
GLUCOSE SERPL-MCNC: 116 MG/DL — HIGH (ref 70–99)
GLUCOSE UR QL: ABNORMAL
HCT VFR BLD CALC: 40.8 % — SIGNIFICANT CHANGE UP (ref 39–50)
HGB BLD-MCNC: 13.7 G/DL — SIGNIFICANT CHANGE UP (ref 13–17)
KETONES UR-MCNC: NEGATIVE — SIGNIFICANT CHANGE UP
LEUKOCYTE ESTERASE UR-ACNC: NEGATIVE — SIGNIFICANT CHANGE UP
MCHC RBC-ENTMCNC: 28.1 PG — SIGNIFICANT CHANGE UP (ref 27–34)
MCHC RBC-ENTMCNC: 33.6 GM/DL — SIGNIFICANT CHANGE UP (ref 32–36)
MCV RBC AUTO: 83.8 FL — SIGNIFICANT CHANGE UP (ref 80–100)
MRSA PCR RESULT.: SIGNIFICANT CHANGE UP
NITRITE UR-MCNC: NEGATIVE — SIGNIFICANT CHANGE UP
NRBC # BLD: 0 /100 WBCS — SIGNIFICANT CHANGE UP (ref 0–0)
PA ADP PRP-ACNC: 260 PRU — SIGNIFICANT CHANGE UP (ref 194–417)
PH UR: 6.5 — SIGNIFICANT CHANGE UP (ref 5–8)
PLATELET # BLD AUTO: 188 K/UL — SIGNIFICANT CHANGE UP (ref 150–400)
POTASSIUM SERPL-MCNC: 3.9 MMOL/L — SIGNIFICANT CHANGE UP (ref 3.5–5.3)
POTASSIUM SERPL-SCNC: 3.9 MMOL/L — SIGNIFICANT CHANGE UP (ref 3.5–5.3)
PREALB SERPL-MCNC: 23 MG/DL — SIGNIFICANT CHANGE UP (ref 20–40)
PROT SERPL-MCNC: 6.8 G/DL — SIGNIFICANT CHANGE UP (ref 6–8.3)
PROT UR-MCNC: NEGATIVE — SIGNIFICANT CHANGE UP
RBC # BLD: 4.87 M/UL — SIGNIFICANT CHANGE UP (ref 4.2–5.8)
RBC # FLD: 12.2 % — SIGNIFICANT CHANGE UP (ref 10.3–14.5)
S AUREUS DNA NOSE QL NAA+PROBE: SIGNIFICANT CHANGE UP
SODIUM SERPL-SCNC: 139 MMOL/L — SIGNIFICANT CHANGE UP (ref 135–145)
SP GR SPEC: 1.02 — SIGNIFICANT CHANGE UP (ref 1.01–1.02)
T4 FREE SERPL-MCNC: 1.5 NG/DL — SIGNIFICANT CHANGE UP (ref 0.9–1.8)
UROBILINOGEN FLD QL: NEGATIVE — SIGNIFICANT CHANGE UP
WBC # BLD: 4.54 K/UL — SIGNIFICANT CHANGE UP (ref 3.8–10.5)
WBC # FLD AUTO: 4.54 K/UL — SIGNIFICANT CHANGE UP (ref 3.8–10.5)

## 2023-07-14 PROCEDURE — 99232 SBSQ HOSP IP/OBS MODERATE 35: CPT

## 2023-07-14 PROCEDURE — 94010 BREATHING CAPACITY TEST: CPT | Mod: 26

## 2023-07-14 PROCEDURE — 71045 X-RAY EXAM CHEST 1 VIEW: CPT | Mod: 26

## 2023-07-14 PROCEDURE — 93880 EXTRACRANIAL BILAT STUDY: CPT | Mod: 26

## 2023-07-14 PROCEDURE — 93010 ELECTROCARDIOGRAM REPORT: CPT

## 2023-07-14 RX ORDER — ASPIRIN/CALCIUM CARB/MAGNESIUM 324 MG
81 TABLET ORAL DAILY
Refills: 0 | Status: DISCONTINUED | OUTPATIENT
Start: 2023-07-14 | End: 2023-07-17

## 2023-07-14 RX ADMIN — ATORVASTATIN CALCIUM 20 MILLIGRAM(S): 80 TABLET, FILM COATED ORAL at 21:21

## 2023-07-14 RX ADMIN — HEPARIN SODIUM 12 UNIT(S)/HR: 5000 INJECTION INTRAVENOUS; SUBCUTANEOUS at 00:34

## 2023-07-14 RX ADMIN — Medication 12.5 MILLIGRAM(S): at 18:05

## 2023-07-14 RX ADMIN — SODIUM CHLORIDE 3 MILLILITER(S): 9 INJECTION INTRAMUSCULAR; INTRAVENOUS; SUBCUTANEOUS at 14:00

## 2023-07-14 RX ADMIN — FAMOTIDINE 20 MILLIGRAM(S): 10 INJECTION INTRAVENOUS at 12:06

## 2023-07-14 RX ADMIN — SODIUM CHLORIDE 3 MILLILITER(S): 9 INJECTION INTRAMUSCULAR; INTRAVENOUS; SUBCUTANEOUS at 05:53

## 2023-07-14 RX ADMIN — Medication 81 MILLIGRAM(S): at 12:06

## 2023-07-14 RX ADMIN — MUPIROCIN 1 APPLICATION(S): 20 OINTMENT TOPICAL at 06:05

## 2023-07-14 RX ADMIN — SODIUM CHLORIDE 3 MILLILITER(S): 9 INJECTION INTRAMUSCULAR; INTRAVENOUS; SUBCUTANEOUS at 22:41

## 2023-07-14 RX ADMIN — AMLODIPINE BESYLATE 10 MILLIGRAM(S): 2.5 TABLET ORAL at 06:04

## 2023-07-14 RX ADMIN — Medication 12.5 MILLIGRAM(S): at 06:04

## 2023-07-14 NOTE — CONSULT NOTE ADULT - SUBJECTIVE AND OBJECTIVE BOX
HPI:  Patient is 63 year old male with PMHx HTN, T2DM on metformin who presents to [facility] ED after +stress test 7/11 at 2pm. Patient afterwards ~@1530 began complaining of severe chest pressure with radiation to L arm/shoulder and generalized weakness. Pt states he had similar chest pain ~1 month ago but notes chest pain at that time resolved. Patient admits to extensive family history of CAD. Patient denies SOB, palpitations, history of CAD, HF, or recent illness. Patient follows with cardiologist Dr. Posada.  Patient received Brilinta 180 mg x1 in Dr. Posada's office,   ED course- cardiology was called initially for STEMI alert. Upon review of EKG, pt does not meet STEMI criteria- , A fib RVR, LEON in aVR with ST depressions leads II, III, aVF, V3-V6. Patient with continued chest pressure. Pt s/p load with ASA/Heparin as per ACS protocol, 2.5 mg IVP Lopressor x1 and 25 mg PO Lopressor for rate control. Troponin 48.  7/12 patient had Cardiac Cath which revealed 3VCAD. Now for Tx to [facility] for Cardiac Surgery Evaluation.     (13 Jul 2023 22:24)      Patient has history of diabetes, A1C 8.2 % on Metformin at home.   Endo was consulted for glycemic control.      PAST MEDICAL & SURGICAL HISTORY:  HTN (hypertension)      Diabetes mellitus      GERD (gastroesophageal reflux disease)      Hyperlipidemia      No significant past surgical history          FAMILY HISTORY:  FH: CAD (coronary artery disease) (Father, Mother, Sibling)        Social History:            HOME MEDICATIONS:  Home Medications:  amlodipine-olmesartan 10 mg-20 mg oral tablet: 1 orally once a day (11 Jul 2023 23:59)  cholecalciferol 1250 mcg (50,000 intl units) oral capsule: 1 orally once a week (11 Jul 2023 23:58)  famotidine 20 mg oral tablet: 1 orally once a day (11 Jul 2023 23:55)  Lipitor 20 mg oral tablet: 1 orally once a day (11 Jul 2023 23:54)  metFORMIN 500 mg oral tablet: 1 orally 2 times a day (11 Jul 2023 23:55)            MEDICATIONS  (STANDING):  amLODIPine   Tablet 10 milliGRAM(s) Oral daily  aspirin enteric coated 81 milliGRAM(s) Oral daily  atorvastatin 20 milliGRAM(s) Oral at bedtime  dextrose 5%. 1000 milliLiter(s) (50 mL/Hr) IV Continuous <Continuous>  dextrose 50% Injectable 25 Gram(s) IV Push once  famotidine    Tablet 20 milliGRAM(s) Oral daily  glucagon  Injectable 1 milliGRAM(s) IntraMuscular once  heparin  Infusion 1200 Unit(s)/Hr (12 mL/Hr) IV Continuous <Continuous>  insulin lispro (ADMELOG) corrective regimen sliding scale   SubCutaneous three times a day before meals  insulin lispro (ADMELOG) corrective regimen sliding scale   SubCutaneous at bedtime  metoprolol tartrate 12.5 milliGRAM(s) Oral every 12 hours  mupirocin 2% Ointment 1 Application(s) Both Nostrils every 12 hours  sodium chloride 0.9% lock flush 3 milliLiter(s) IV Push every 8 hours    MEDICATIONS  (PRN):  dextrose 50% Injectable 12.5 Gram(s) IV Push once PRN low blood glucose  dextrose Oral Gel 15 Gram(s) Oral once PRN Blood Glucose LESS THAN 70 milliGRAM(s)/deciliter      Allergies    No Known Allergies    Intolerances        Review of Systems:  Neuro: No HA, no dizziness  Cardiovascular: No chest pain, no palpitations  Respiratory: no SOB, no cough  GI: No nausea, vomiting, abdominal pain  MSK: Denies joint/muscle pain      ALL OTHER SYSTEMS REVIEWED AND NEGATIVE    PHYSICAL EXAM:  VITALS: T(C): 36.8 (07-14-23 @ 05:09)  T(F): 98.2 (07-14-23 @ 05:09), Max: 98.4 (07-13-23 @ 20:59)  HR: 74 (07-14-23 @ 05:09) (64 - 83)  BP: 123/77 (07-14-23 @ 05:09) (101/64 - 138/91)  RR:  (15 - 19)  SpO2:  (97% - 99%)  Wt(kg): --  GENERAL: NAD, well-groomed, well-developed  NEURO:  alert and oriented  RESPIRATORY: Clear to auscultation bilaterally; No rales, rhonchi, wheezing  CARDIOVASCULAR: Si S2  GI: Soft, non distended, normal bowel sounds  MUSCULOSKELETAL: Moves all extremities equally       POCT Blood Glucose.: 114 mg/dL (07-14-23 @ 11:01)  POCT Blood Glucose.: 115 mg/dL (07-13-23 @ 21:29)  POCT Blood Glucose.: 176 mg/dL (07-13-23 @ 17:19)  POCT Blood Glucose.: 95 mg/dL (07-13-23 @ 11:53)  POCT Blood Glucose.: 116 mg/dL (07-13-23 @ 07:33)  POCT Blood Glucose.: 117 mg/dL (07-12-23 @ 21:21)  POCT Blood Glucose.: 97 mg/dL (07-12-23 @ 17:29)  POCT Blood Glucose.: 103 mg/dL (07-12-23 @ 07:34)  POCT Blood Glucose.: 113 mg/dL (07-11-23 @ 22:20)  POCT Blood Glucose.: 131 mg/dL (07-11-23 @ 17:12)                            13.7   4.54  )-----------( 188      ( 14 Jul 2023 06:36 )             40.8       07-14    139  |  103  |  11  ----------------------------<  116<H>  3.9   |  21<L>  |  0.95    eGFR: 90    Ca    9.5      07-14  Mg     2.00     07-13  Phos  4.0     07-13    TPro  6.8  /  Alb  4.1  /  TBili  0.3  /  DBili  x   /  AST  17  /  ALT  17  /  AlkPhos  72  07-14      Thyroid Function Tests:  07-14 @ 06:37 TSH -- FreeT4 1.5 T3 -- Anti TPO -- Anti Thyroglobulin Ab -- TSI --  07-11 @ 21:55 TSH 2.71 FreeT4 -- T3 -- Anti TPO -- Anti Thyroglobulin Ab -- TSI --    Diet, DASH/TLC:   Sodium & Cholesterol Restricted  Consistent Carbohydrate No Snacks (CSTCHO)  Halal     Special Instructions for Nursing:  Sodium & Cholesterol Restricted (07-13-23 @ 22:18) [Active]        07-12 Chol 116 Direct LDL -- LDL calculated 54 HDL 48 Trig 71  A1C with Estimated Average Glucose Result: 8.2 % (07-12-23 @ 03:35)                     HPI:  Patient is 63 year old male with PMHx HTN, T2DM on metformin who presents to [facility] ED after +stress test 7/11 at 2pm. Patient afterwards ~@1530 began complaining of severe chest pressure with radiation to L arm/shoulder and generalized weakness. Pt states he had similar chest pain ~1 month ago but notes chest pain at that time resolved. Patient admits to extensive family history of CAD. Patient denies SOB, palpitations, history of CAD, HF, or recent illness. Patient follows with cardiologist Dr. Posada.  Patient received Brilinta 180 mg x1 in Dr. Posada's office,   ED course- cardiology was called initially for STEMI alert. Upon review of EKG, pt does not meet STEMI criteria- , A fib RVR, LEON in aVR with ST depressions leads II,  III, aVF, V3-V6. Patient with continued chest pressure. Pt s/p load with ASA/Heparin as per ACS protocol, 2.5 mg IVP Lopressor x1 and 25 mg PO Lopressor for rate control. Troponin 48.  7/12 patient had Cardiac Cath which revealed 3VCAD. Now for Tx to [facility] for Cardiac Surgery Evaluation.     (13 Jul 2023 22:24)      Patient has history of diabetes, A1C 8.2 % on Metformin at home.   Endo was consulted for glycemic control.      PAST MEDICAL & SURGICAL HISTORY:  HTN (hypertension)      Diabetes mellitus      GERD (gastroesophageal reflux disease)      Hyperlipidemia      No significant past surgical history          FAMILY HISTORY:  FH: CAD (coronary artery disease) (Father, Mother, Sibling)        Social History:            HOME MEDICATIONS:  Home Medications:  amlodipine-olmesartan 10 mg-20 mg oral tablet: 1 orally once a day (11 Jul 2023 23:59)  cholecalciferol 1250 mcg (50,000 intl units) oral capsule: 1 orally once a week (11 Jul 2023 23:58)  famotidine 20 mg oral tablet: 1 orally once a day (11 Jul 2023 23:55)  Lipitor 20 mg oral tablet: 1 orally once a day (11 Jul 2023 23:54)  metFORMIN 500 mg oral tablet: 1 orally 2 times a day (11 Jul 2023 23:55)            MEDICATIONS  (STANDING):  amLODIPine   Tablet 10 milliGRAM(s) Oral daily  aspirin enteric coated 81 milliGRAM(s) Oral daily  atorvastatin 20 milliGRAM(s) Oral at bedtime  dextrose 5%. 1000 milliLiter(s) (50 mL/Hr) IV Continuous <Continuous>  dextrose 50% Injectable 25 Gram(s) IV Push once  famotidine    Tablet 20 milliGRAM(s) Oral daily  glucagon  Injectable 1 milliGRAM(s) IntraMuscular once  heparin  Infusion 1200 Unit(s)/Hr (12 mL/Hr) IV Continuous <Continuous>  insulin lispro (ADMELOG) corrective regimen sliding scale   SubCutaneous three times a day before meals  insulin lispro (ADMELOG) corrective regimen sliding scale   SubCutaneous at bedtime  metoprolol tartrate 12.5 milliGRAM(s) Oral every 12 hours  mupirocin 2% Ointment 1 Application(s) Both Nostrils every 12 hours  sodium chloride 0.9% lock flush 3 milliLiter(s) IV Push every 8 hours    MEDICATIONS  (PRN):  dextrose 50% Injectable 12.5 Gram(s) IV Push once PRN low blood glucose  dextrose Oral Gel 15 Gram(s) Oral once PRN Blood Glucose LESS THAN 70 milliGRAM(s)/deciliter      Allergies    No Known Allergies    Intolerances        Review of Systems:  Neuro: No HA, no dizziness  Cardiovascular: No chest pain, no palpitations  Respiratory: no SOB, no cough  GI: No nausea, vomiting, abdominal pain  MSK: Denies joint/muscle pain      ALL OTHER SYSTEMS REVIEWED AND NEGATIVE    PHYSICAL EXAM:  VITALS: T(C): 36.8 (07-14-23 @ 05:09)  T(F): 98.2 (07-14-23 @ 05:09), Max: 98.4 (07-13-23 @ 20:59)  HR: 74 (07-14-23 @ 05:09) (64 - 83)  BP: 123/77 (07-14-23 @ 05:09) (101/64 - 138/91)  RR:  (15 - 19)  SpO2:  (97% - 99%)  Wt(kg): --  GENERAL: NAD, well-groomed, well-developed  NEURO:  alert and oriented  RESPIRATORY: Clear to auscultation bilaterally; No rales, rhonchi, wheezing  CARDIOVASCULAR: Si S2  GI: Soft, non distended, normal bowel sounds  MUSCULOSKELETAL: Moves all extremities equally       POCT Blood Glucose.: 114 mg/dL (07-14-23 @ 11:01)  POCT Blood Glucose.: 115 mg/dL (07-13-23 @ 21:29)  POCT Blood Glucose.: 176 mg/dL (07-13-23 @ 17:19)  POCT Blood Glucose.: 95 mg/dL (07-13-23 @ 11:53)  POCT Blood Glucose.: 116 mg/dL (07-13-23 @ 07:33)  POCT Blood Glucose.: 117 mg/dL (07-12-23 @ 21:21)  POCT Blood Glucose.: 97 mg/dL (07-12-23 @ 17:29)  POCT Blood Glucose.: 103 mg/dL (07-12-23 @ 07:34)  POCT Blood Glucose.: 113 mg/dL (07-11-23 @ 22:20)  POCT Blood Glucose.: 131 mg/dL (07-11-23 @ 17:12)                            13.7   4.54  )-----------( 188      ( 14 Jul 2023 06:36 )             40.8       07-14    139  |  103  |  11  ----------------------------<  116<H>  3.9   |  21<L>  |  0.95    eGFR: 90    Ca    9.5      07-14  Mg     2.00     07-13  Phos  4.0     07-13    TPro  6.8  /  Alb  4.1  /  TBili  0.3  /  DBili  x   /  AST  17  /  ALT  17  /  AlkPhos  72  07-14      Thyroid Function Tests:  07-14 @ 06:37 TSH -- FreeT4 1.5 T3 -- Anti TPO -- Anti Thyroglobulin Ab -- TSI --  07-11 @ 21:55 TSH 2.71 FreeT4 -- T3 -- Anti TPO -- Anti Thyroglobulin Ab -- TSI --    Diet, DASH/TLC:   Sodium & Cholesterol Restricted  Consistent Carbohydrate No Snacks (CSTCHO)  Halal     Special Instructions for Nursing:  Sodium & Cholesterol Restricted (07-13-23 @ 22:18) [Active]        07-12 Chol 116 Direct LDL -- LDL calculated 54 HDL 48 Trig 71  A1C with Estimated Average Glucose Result: 8.2 % (07-12-23 @ 03:35)

## 2023-07-14 NOTE — PROGRESS NOTE ADULT - PROBLEM SELECTOR PLAN 1
Pre op Cardiac surgery work up in progress   c/w ASA 81, started Lopressor, Atorvastatin qHS,   NPO after midnight   Type and Screen UTD x2  Heparin gtt on call to OR for ACS   Plan for Cardiac Surgery in ? tomorrow 7/14 with Dr. Barron (P2Y12 only 142 will recheck in am)  Dr. Barron to review labs and imaging Pre op Cardiac surgery work up in progress   c/w ASA 81, started Lopressor, Atorvastatin qHS,   Heparin gtt for AC  p2y12 260  OR monday 7/17 with Dr. Barron

## 2023-07-14 NOTE — CONSULT NOTE ADULT - ASSESSMENT
Assessment  DMT2: 63y Male with DM T2 with hyperglycemia, A1C 8.2%, was on oral meds at home, now on coverage sliding scale, awaiting CABG.  Will need tight glycemic control for postop wound healing.   CAD: on medications, stable, monitored. Pending cabg, p2y12   HTN: on antihypertensive medications, monitored, asymptomatic.  HLD: on high intensity statin.     Discussed plan and management wit Dr Lonnie Fleming MD  Cell: [phone]  Office: [phone]               Assessment  DMT2: 63y Male with DM T2 with hyperglycemia, A1C 8.2%, was on oral meds at home, now on coverage sliding scale,  awaiting CABG.  Will need tight glycemic control for postop wound healing.   CAD: on medications, stable, monitored. Pending cabg, p2y12   HTN: on antihypertensive medications, monitored, asymptomatic.  HLD: on high intensity statin.     Discussed plan and management wit Dr Lonnie Fleming MD  Cell: [phone]  Office: [phone]

## 2023-07-14 NOTE — PROGRESS NOTE ADULT - ASSESSMENT
Patient is 63 year old male with PMHx HTN, T2DM on metformin who presents to [facility] ED after +stress test 7/11 at 2pm. Patient afterwards ~@1530 began complaining of severe chest pressure with radiation to L arm/shoulder and generalized weakness. Pt states he had similar chest pain ~1 month ago but notes chest pain at that time resolved. Patient admits to extensive family history of CAD. Patient denies SOB, palpitations, history of CAD, HF, or recent illness. Patient follows with cardiologist Dr. Posada.  Patient received Brilinta 180 mg x1 in Dr. Posada's office,   ED course- cardiology was called initially for STEMI alert. Upon review of EKG, pt does not meet STEMI criteria- , A fib RVR, LEON in aVR with ST depressions leads II, III, aVF, V3-V6. Patient with continued chest pressure. Pt s/p load with ASA/Heparin as per ACS protocol, 2.5 mg IVP Lopressor x1 and 25 mg PO Lopressor for rate control. Troponin 48.  7/12 patient had Cardiac Cath which revealed 3VCAD  7/13 Tx to [facility] for CABG on 7/14 by Dr. Barron. Of note P2Y12 was 142.   Patient is 63 year old male with PMHx HTN, T2DM on metformin who presents to [facility] ED after +stress test 7/11 at 2pm. Patient afterwards ~@1530 began complaining of severe chest pressure with radiation to L arm/shoulder and generalized weakness. Pt states he had similar chest pain ~1 month ago but notes chest pain at that time resolved. Patient admits to extensive family history of CAD. Patient denies SOB, palpitations, history of CAD, HF, or recent illness. Patient follows with cardiologist Dr. Posada.  Patient received Brilinta 180 mg x1 in Dr. Posada's office,   ED course- cardiology was called initially for STEMI alert. Upon review of EKG, pt does not meet STEMI criteria- , A fib RVR, LEON in aVR with ST depressions leads II, III, aVF, V3-V6. Patient with continued chest pressure. Pt s/p load with ASA/Heparin as per ACS protocol, 2.5 mg IVP Lopressor x1 and 25 mg PO Lopressor for rate control. Troponin 48.  7/12 patient had Cardiac Cath which revealed 3VCAD  7/13 Tx to [facility] for CABG on 7/14 by Dr. Barorn. Of note P2Y12 was 142.  7/14 VSS: RSR 60-80; continue asa/ hep gttp/ bb and statin; heparin gttp for AC; endo consulted for uncontrolled dm- aic 8.2; p2y12 260  OR monday 7/17 with Dr. Barron

## 2023-07-14 NOTE — PROGRESS NOTE ADULT - SUBJECTIVE AND OBJECTIVE BOX
VITAL SIGNS    Telemetry:    Vital Signs Last 24 Hrs  T(C): 36.8 (23 @ 05:09), Max: 36.9 (23 @ 20:59)  T(F): 98.2 (23 @ 05:09), Max: 98.4 (23 @ 20:59)  HR: 74 (23 @ 05:09) (64 - 83)  BP: 123/77 (23 @ 05:09) (101/64 - 138/91)  RR: 18 (23 @ 05:09) (15 - 21)  SpO2: 97% (23 @ 05:09) (96% - 99%)             Daily     Daily Weight in k.1 (2023 08:20)  Admit Wt: Drug Dosing Weight  Height (cm): 167.6 (2023 22:41)  Weight (kg): 82.1 (2023 20:59)  BMI (kg/m2): 29.2 (2023 20:59)  BSA (m2): 1.92 (2023 20:59)    Bilirubin Total: 0.3 mg/dL ( @ 06:36)    CAPILLARY BLOOD GLUCOSE      POCT Blood Glucose.: 115 mg/dL (2023 21:29)  POCT Blood Glucose.: 176 mg/dL (2023 17:19)  POCT Blood Glucose.: 95 mg/dL (2023 11:53)          LABS:  cret                        13.7   4.54  )-----------( 188      ( 2023 06:36 )             40.8     14    139  |  103  |  11  ----------------------------<  116<H>  3.9   |  21<L>  |  0.95    Ca    9.5      2023 06:36  Phos  4.0     -13  Mg     2.00         TPro  6.8  /  Alb  4.1  /  TBili  0.3  /  DBili  x   /  AST  17  /  ALT  17  /  AlkPhos  72  07-14    PT/INR - ( 2023 22:55 )   PT: 12.4 sec;   INR: 1.07 ratio         PTT - ( 2023 06:36 )  PTT:94.0 sec    amLODIPine   Tablet 10 milliGRAM(s) Oral daily  aspirin enteric coated 81 milliGRAM(s) Oral daily  atorvastatin 20 milliGRAM(s) Oral at bedtime  dextrose 5%. 1000 milliLiter(s) IV Continuous <Continuous>  dextrose 50% Injectable 25 Gram(s) IV Push once  dextrose 50% Injectable 12.5 Gram(s) IV Push once PRN  dextrose Oral Gel 15 Gram(s) Oral once PRN  famotidine    Tablet 20 milliGRAM(s) Oral daily  glucagon  Injectable 1 milliGRAM(s) IntraMuscular once  heparin  Infusion 1200 Unit(s)/Hr IV Continuous <Continuous>  insulin lispro (ADMELOG) corrective regimen sliding scale   SubCutaneous three times a day before meals  insulin lispro (ADMELOG) corrective regimen sliding scale   SubCutaneous at bedtime  metoprolol tartrate 12.5 milliGRAM(s) Oral every 12 hours  mupirocin 2% Ointment 1 Application(s) Both Nostrils every 12 hours  sodium chloride 0.9% lock flush 3 milliLiter(s) IV Push every 8 hours      PHYSICAL EXAM    Subjective: "Hi.   Neurology: alert and oriented x 3, nonfocal, no gross deficits  CV : tele:  RSR  Sternal Wound :  CDI with dressing , Stable  Lungs: clear. RR easy, unlabored   Abdomen: soft, nontender, nondistended, positive bowel sounds, bowel movement   Neg N/V/D   :  pt voiding without difficulty   Extremities:   WOMACK; edema, neg calf tenderness.   PPP bilaterally      PW:  Chest tubes:                 VITAL SIGNS    Telemetry:  rsr 60-80  Vital Signs Last 24 Hrs  T(C): 36.8 (23 @ 05:09), Max: 36.9 (23 @ 20:59)  T(F): 98.2 (23 @ 05:09), Max: 98.4 (23 @ 20:59)  HR: 74 (23 @ 05:09) (64 - 83)  BP: 123/77 (23 @ 05:09) (101/64 - 138/91)  RR: 18 (23 @ 05:09) (15 - 21)  SpO2: 97% (23 @ 05:09) (96% - 99%)             Daily     Daily Weight in k.1 (2023 08:20)  Admit Wt: Drug Dosing Weight  Height (cm): 167.6 (2023 22:41)  Weight (kg): 82.1 (2023 20:59)  BMI (kg/m2): 29.2 (2023 20:59)  BSA (m2): 1.92 (2023 20:59)    Bilirubin Total: 0.3 mg/dL ( @ 06:36)    CAPILLARY BLOOD GLUCOSE      POCT Blood Glucose.: 115 mg/dL (2023 21:29)  POCT Blood Glucose.: 176 mg/dL (2023 17:19)  POCT Blood Glucose.: 95 mg/dL (2023 11:53)          LABS:  cret                        13.7   4.54  )-----------( 188      ( 2023 06:36 )             40.8     -    139  |  103  |  11  ----------------------------<  116<H>  3.9   |  21<L>  |  0.95    Ca    9.5      2023 06:36  Phos  4.0     07-13  Mg     2.00     -    TPro  6.8  /  Alb  4.1  /  TBili  0.3  /  DBili  x   /  AST  17  /  ALT  17  /  AlkPhos  72  07-14    PT/INR - ( 2023 22:55 )   PT: 12.4 sec;   INR: 1.07 ratio         PTT - ( 2023 06:36 )  PTT:94.0 sec    amLODIPine   Tablet 10 milliGRAM(s) Oral daily  aspirin enteric coated 81 milliGRAM(s) Oral daily  atorvastatin 20 milliGRAM(s) Oral at bedtime  dextrose 5%. 1000 milliLiter(s) IV Continuous <Continuous>  dextrose 50% Injectable 25 Gram(s) IV Push once  dextrose 50% Injectable 12.5 Gram(s) IV Push once PRN  dextrose Oral Gel 15 Gram(s) Oral once PRN  famotidine    Tablet 20 milliGRAM(s) Oral daily  glucagon  Injectable 1 milliGRAM(s) IntraMuscular once  heparin  Infusion 1200 Unit(s)/Hr IV Continuous <Continuous>  insulin lispro (ADMELOG) corrective regimen sliding scale   SubCutaneous three times a day before meals  insulin lispro (ADMELOG) corrective regimen sliding scale   SubCutaneous at bedtime  metoprolol tartrate 12.5 milliGRAM(s) Oral every 12 hours  mupirocin 2% Ointment 1 Application(s) Both Nostrils every 12 hours  sodium chloride 0.9% lock flush 3 milliLiter(s) IV Push every 8 hours        PHYSICAL EXAM    Subjective: "I don't have pain."  Neurology: alert and oriented x 3, nonfocal, no gross deficits  CV : tele:  RSR 60-80  Lungs: clear. RR easy, unlabored   Abdomen: soft, nontender, nondistended, positive bowel sounds, + bowel movement   Neg N/V/D   :  pt voiding without difficulty   Extremities:   WOMACK; neg LE edema, neg calf tenderness.   PPP bilaterally ; rt radial cath site cdi priyanka- neg hematoma/ bleeding

## 2023-07-15 LAB
ANION GAP SERPL CALC-SCNC: 13 MMOL/L — SIGNIFICANT CHANGE UP (ref 5–17)
APTT BLD: 82.3 SEC — HIGH (ref 27.5–35.5)
BUN SERPL-MCNC: 13 MG/DL — SIGNIFICANT CHANGE UP (ref 7–23)
CALCIUM SERPL-MCNC: 9.5 MG/DL — SIGNIFICANT CHANGE UP (ref 8.4–10.5)
CHLORIDE SERPL-SCNC: 104 MMOL/L — SIGNIFICANT CHANGE UP (ref 96–108)
CO2 SERPL-SCNC: 22 MMOL/L — SIGNIFICANT CHANGE UP (ref 22–31)
CREAT SERPL-MCNC: 0.92 MG/DL — SIGNIFICANT CHANGE UP (ref 0.5–1.3)
EGFR: 93 ML/MIN/1.73M2 — SIGNIFICANT CHANGE UP
GLUCOSE BLDC GLUCOMTR-MCNC: 105 MG/DL — HIGH (ref 70–99)
GLUCOSE BLDC GLUCOMTR-MCNC: 107 MG/DL — HIGH (ref 70–99)
GLUCOSE BLDC GLUCOMTR-MCNC: 110 MG/DL — HIGH (ref 70–99)
GLUCOSE BLDC GLUCOMTR-MCNC: 114 MG/DL — HIGH (ref 70–99)
GLUCOSE SERPL-MCNC: 122 MG/DL — HIGH (ref 70–99)
HCT VFR BLD CALC: 40.6 % — SIGNIFICANT CHANGE UP (ref 39–50)
HGB BLD-MCNC: 13.6 G/DL — SIGNIFICANT CHANGE UP (ref 13–17)
MCHC RBC-ENTMCNC: 28.2 PG — SIGNIFICANT CHANGE UP (ref 27–34)
MCHC RBC-ENTMCNC: 33.5 GM/DL — SIGNIFICANT CHANGE UP (ref 32–36)
MCV RBC AUTO: 84.2 FL — SIGNIFICANT CHANGE UP (ref 80–100)
NRBC # BLD: 0 /100 WBCS — SIGNIFICANT CHANGE UP (ref 0–0)
PLATELET # BLD AUTO: 186 K/UL — SIGNIFICANT CHANGE UP (ref 150–400)
POTASSIUM SERPL-MCNC: 3.9 MMOL/L — SIGNIFICANT CHANGE UP (ref 3.5–5.3)
POTASSIUM SERPL-SCNC: 3.9 MMOL/L — SIGNIFICANT CHANGE UP (ref 3.5–5.3)
RBC # BLD: 4.82 M/UL — SIGNIFICANT CHANGE UP (ref 4.2–5.8)
RBC # FLD: 12.3 % — SIGNIFICANT CHANGE UP (ref 10.3–14.5)
SODIUM SERPL-SCNC: 139 MMOL/L — SIGNIFICANT CHANGE UP (ref 135–145)
WBC # BLD: 4.84 K/UL — SIGNIFICANT CHANGE UP (ref 3.8–10.5)
WBC # FLD AUTO: 4.84 K/UL — SIGNIFICANT CHANGE UP (ref 3.8–10.5)

## 2023-07-15 PROCEDURE — 99232 SBSQ HOSP IP/OBS MODERATE 35: CPT

## 2023-07-15 RX ADMIN — ATORVASTATIN CALCIUM 20 MILLIGRAM(S): 80 TABLET, FILM COATED ORAL at 21:26

## 2023-07-15 RX ADMIN — AMLODIPINE BESYLATE 10 MILLIGRAM(S): 2.5 TABLET ORAL at 05:51

## 2023-07-15 RX ADMIN — SODIUM CHLORIDE 3 MILLILITER(S): 9 INJECTION INTRAMUSCULAR; INTRAVENOUS; SUBCUTANEOUS at 22:36

## 2023-07-15 RX ADMIN — HEPARIN SODIUM 12 UNIT(S)/HR: 5000 INJECTION INTRAVENOUS; SUBCUTANEOUS at 12:42

## 2023-07-15 RX ADMIN — Medication 81 MILLIGRAM(S): at 12:41

## 2023-07-15 RX ADMIN — Medication 12.5 MILLIGRAM(S): at 17:25

## 2023-07-15 RX ADMIN — FAMOTIDINE 20 MILLIGRAM(S): 10 INJECTION INTRAVENOUS at 12:41

## 2023-07-15 RX ADMIN — SODIUM CHLORIDE 3 MILLILITER(S): 9 INJECTION INTRAMUSCULAR; INTRAVENOUS; SUBCUTANEOUS at 07:58

## 2023-07-15 RX ADMIN — SODIUM CHLORIDE 3 MILLILITER(S): 9 INJECTION INTRAMUSCULAR; INTRAVENOUS; SUBCUTANEOUS at 14:12

## 2023-07-15 RX ADMIN — Medication 12.5 MILLIGRAM(S): at 05:51

## 2023-07-15 NOTE — DIETITIAN INITIAL EVALUATION ADULT - NS FNS DIET ORDER
Diet, DASH/TLC:   Sodium & Cholesterol Restricted  Consistent Carbohydrate {No Snacks} (CSTCHO)  Jah     Special Instructions for Nursing:  Sodium & Cholesterol Restricted (07-13-23 @ 22:18) [Active]

## 2023-07-15 NOTE — DIETITIAN INITIAL EVALUATION ADULT - PERTINENT LABORATORY DATA
07-15    139  |  104  |  13  ----------------------------<  122<H>  3.9   |  22  |  0.92    Ca    9.5      15 Jul 2023 05:10    TPro  6.8  /  Alb  4.1  /  TBili  0.3  /  DBili  x   /  AST  17  /  ALT  17  /  AlkPhos  72  07-14    CAPILLARY BLOOD GLUCOSE  POCT Blood Glucose.: 107 mg/dL (15 Jul 2023 07:21)  POCT Blood Glucose.: 118 mg/dL (14 Jul 2023 21:21)  POCT Blood Glucose.: 111 mg/dL (14 Jul 2023 16:36)    A1C with Estimated Average Glucose Result: 8.2 % (07-12-23 @ 03:35)

## 2023-07-15 NOTE — PROGRESS NOTE ADULT - SUBJECTIVE AND OBJECTIVE BOX
VITAL SIGNS    Telemetry:    Vital Signs Last 24 Hrs  T(C): 36.7 (07-15-23 @ 04:41), Max: 37.1 (23 @ 12:14)  T(F): 98 (07-15-23 @ 04:41), Max: 98.7 (23 @ 12:14)  HR: 73 (07-15-23 @ 04:41) (71 - 78)  BP: 117/73 (07-15-23 @ 04:41) (112/72 - 123/74)  RR: 18 (07-15-23 @ 04:41) (18 - 18)  SpO2: 97% (07-15-23 @ 04:41) (97% - 98%)             @ 07:01  -  07-15 @ 07:00  --------------------------------------------------------  IN: 1116 mL / OUT: 400 mL / NET: 716 mL    07-15 @ 07:01  -  07-15 @ 09:18  --------------------------------------------------------  IN: 177 mL / OUT: 0 mL / NET: 177 mL       Daily     Daily Weight in k.3 (15 Jul 2023 08:47)  Admit Wt: Drug Dosing Weight  Height (cm): 167.6 (2023 22:41)  Weight (kg): 82.1 (2023 20:59)  BMI (kg/m2): 29.2 (2023 20:59)  BSA (m2): 1.92 (2023 20:59)      CAPILLARY BLOOD GLUCOSE      POCT Blood Glucose.: 107 mg/dL (15 Jul 2023 07:21)  POCT Blood Glucose.: 118 mg/dL (2023 21:21)  POCT Blood Glucose.: 111 mg/dL (2023 16:36)  POCT Blood Glucose.: 114 mg/dL (2023 11:01)          LABS:     @ 07:01  -  07-15 @ 07:00  --------------------------------------------------------  IN: 1116 mL / OUT: 400 mL / NET: 716 mL    07-15 @ 07:01  -  07-15 @ 09:18  --------------------------------------------------------  IN: 177 mL / OUT: 0 mL / NET: 177 mL    cret                        13.6   4.84  )-----------( 186      ( 15 Jul 2023 05:10 )             40.6     07-15    139  |  104  |  13  ----------------------------<  122<H>  3.9   |  22  |  0.92    Ca    9.5      15 Jul 2023 05:10    TPro  6.8  /  Alb  4.1  /  TBili  0.3  /  DBili  x   /  AST  17  /  ALT  17  /  AlkPhos  72  07-14    PT/INR - ( 2023 22:55 )   PT: 12.4 sec;   INR: 1.07 ratio         PTT - ( 15 Jul 2023 05:10 )  PTT:82.3 sec    amLODIPine   Tablet 10 milliGRAM(s) Oral daily  aspirin enteric coated 81 milliGRAM(s) Oral daily  atorvastatin 20 milliGRAM(s) Oral at bedtime  dextrose 5%. 1000 milliLiter(s) IV Continuous <Continuous>  dextrose 50% Injectable 25 Gram(s) IV Push once  dextrose 50% Injectable 12.5 Gram(s) IV Push once PRN  dextrose Oral Gel 15 Gram(s) Oral once PRN  famotidine    Tablet 20 milliGRAM(s) Oral daily  glucagon  Injectable 1 milliGRAM(s) IntraMuscular once  heparin  Infusion 1200 Unit(s)/Hr IV Continuous <Continuous>  insulin lispro (ADMELOG) corrective regimen sliding scale   SubCutaneous three times a day before meals  insulin lispro (ADMELOG) corrective regimen sliding scale   SubCutaneous at bedtime  metoprolol tartrate 12.5 milliGRAM(s) Oral every 12 hours  sodium chloride 0.9% lock flush 3 milliLiter(s) IV Push every 8 hours      PHYSICAL EXAM    Subjective: "Hi.   Neurology: alert and oriented x 3, nonfocal, no gross deficits  CV : tele:  RSR  Sternal Wound :  CDI with dressing , Stable  Lungs: clear. RR easy, unlabored   Abdomen: soft, nontender, nondistended, positive bowel sounds, bowel movement   Neg N/V/D   :  pt voiding without difficulty   Extremities:   WOMACK; edema, neg calf tenderness.   PPP bilaterally      PW:  Chest tubes:                 VITAL SIGNS    Telemetry:  rsr 60-70  Vital Signs Last 24 Hrs  T(C): 36.7 (07-15-23 @ 04:41), Max: 37.1 (23 @ 12:14)  T(F): 98 (07-15-23 @ 04:41), Max: 98.7 (23 @ 12:14)  HR: 73 (07-15-23 @ 04:41) (71 - 78)  BP: 117/73 (07-15-23 @ 04:41) (112/72 - 123/74)  RR: 18 (07-15-23 @ 04:41) (18 - 18)  SpO2: 97% (07-15-23 @ 04:41) (97% - 98%)             @ 07:01  -  07-15 @ 07:00  --------------------------------------------------------  IN: 1116 mL / OUT: 400 mL / NET: 716 mL    07-15 @ 07:01  -  07-15 @ 09:18  --------------------------------------------------------  IN: 177 mL / OUT: 0 mL / NET: 177 mL       Daily     Daily Weight in k.3 (15 Jul 2023 08:47)  Admit Wt: Drug Dosing Weight  Height (cm): 167.6 (2023 22:41)  Weight (kg): 82.1 (2023 20:59)  BMI (kg/m2): 29.2 (2023 20:59)  BSA (m2): 1.92 (2023 20:59)      CAPILLARY BLOOD GLUCOSE      POCT Blood Glucose.: 107 mg/dL (15 Jul 2023 07:21)  POCT Blood Glucose.: 118 mg/dL (2023 21:21)  POCT Blood Glucose.: 111 mg/dL (2023 16:36)  POCT Blood Glucose.: 114 mg/dL (2023 11:01)          LABS:     @ 07:01  -  07-15 @ 07:00  --------------------------------------------------------  IN: 1116 mL / OUT: 400 mL / NET: 716 mL    07-15 @ 07:01  -  07-15 @ 09:18  --------------------------------------------------------  IN: 177 mL / OUT: 0 mL / NET: 177 mL    cret                        13.6   4.84  )-----------( 186      ( 15 Jul 2023 05:10 )             40.6     07-15    139  |  104  |  13  ----------------------------<  122<H>  3.9   |  22  |  0.92    Ca    9.5      15 Jul 2023 05:10    TPro  6.8  /  Alb  4.1  /  TBili  0.3  /  DBili  x   /  AST  17  /  ALT  17  /  AlkPhos  72  07-14    PT/INR - ( 2023 22:55 )   PT: 12.4 sec;   INR: 1.07 ratio         PTT - ( 15 Jul 2023 05:10 )  PTT:82.3 sec    amLODIPine   Tablet 10 milliGRAM(s) Oral daily  aspirin enteric coated 81 milliGRAM(s) Oral daily  atorvastatin 20 milliGRAM(s) Oral at bedtime  dextrose 5%. 1000 milliLiter(s) IV Continuous <Continuous>  dextrose 50% Injectable 25 Gram(s) IV Push once  dextrose 50% Injectable 12.5 Gram(s) IV Push once PRN  dextrose Oral Gel 15 Gram(s) Oral once PRN  famotidine    Tablet 20 milliGRAM(s) Oral daily  glucagon  Injectable 1 milliGRAM(s) IntraMuscular once  heparin  Infusion 1200 Unit(s)/Hr IV Continuous <Continuous>  insulin lispro (ADMELOG) corrective regimen sliding scale   SubCutaneous three times a day before meals  insulin lispro (ADMELOG) corrective regimen sliding scale   SubCutaneous at bedtime  metoprolol tartrate 12.5 milliGRAM(s) Oral every 12 hours  sodium chloride 0.9% lock flush 3 milliLiter(s) IV Push every 8 hours        PHYSICAL EXAM    Subjective: "I don't have pain."  Neurology: alert and oriented x 3, nonfocal, no gross deficits  CV : tele:  RSR 60-70  Lungs: clear. RR easy, unlabored   Abdomen: soft, nontender, nondistended, positive bowel sounds, + bowel movement   Neg N/V/D   :  pt voiding without difficulty   Extremities:   WOMACK; neg LE edema, neg calf tenderness.   PPP bilaterally ; rt radial cath site cdi priyanka- neg hematoma/ bleeding

## 2023-07-15 NOTE — DIETITIAN INITIAL EVALUATION ADULT - ORAL INTAKE PTA/DIET HISTORY
Pt reports good PO intake, denies history of poor PO intake PTA. Pt does not eat pork, follows halal diet at home, reports trying to consume foods that are low in salt and sugar PTA.   Confirms NKFA/intolerance  Micronutrient/Other supplementation: vit D3 per H&P  Protein-energy supplementation: none

## 2023-07-15 NOTE — PROGRESS NOTE ADULT - ASSESSMENT
Patient is 63 year old male with PMHx HTN, T2DM on metformin who presents to [facility] ED after +stress test 7/11 at 2pm. Patient afterwards ~@1530 began complaining of severe chest pressure with radiation to L arm/shoulder and generalized weakness. Pt states he had similar chest pain ~1 month ago but notes chest pain at that time resolved. Patient admits to extensive family history of CAD. Patient denies SOB, palpitations, history of CAD, HF, or recent illness. Patient follows with cardiologist Dr. Posada.  Patient received Brilinta 180 mg x1 in Dr. Posada's office,   ED course- cardiology was called initially for STEMI alert. Upon review of EKG, pt does not meet STEMI criteria- , A fib RVR, LEON in aVR with ST depressions leads II, III, aVF, V3-V6. Patient with continued chest pressure. Pt s/p load with ASA/Heparin as per ACS protocol, 2.5 mg IVP Lopressor x1 and 25 mg PO Lopressor for rate control. Troponin 48.  7/12 patient had Cardiac Cath which revealed 3VCAD  7/13 Tx to [facility] for CABG on 7/14 by Dr. Barron. Of note P2Y12 was 142.  7/14 VSS: RSR 60-80; continue asa/ hep gttp/ bb and statin; heparin gttp for AC; endo consulted for uncontrolled dm- aic 8.2; p2y12 260  OR monday 7/17 with Dr. Barron  Patient is 63 year old male with PMHx HTN, T2DM on metformin who presents to [facility] ED after +stress test 7/11 at 2pm. Patient afterwards ~@1530 began complaining of severe chest pressure with radiation to L arm/shoulder and generalized weakness. Pt states he had similar chest pain ~1 month ago but notes chest pain at that time resolved. Patient admits to extensive family history of CAD. Patient denies SOB, palpitations, history of CAD, HF, or recent illness. Patient follows with cardiologist Dr. Posada.  Patient received Brilinta 180 mg x1 in Dr. Posada's office,   ED course- cardiology was called initially for STEMI alert. Upon review of EKG, pt does not meet STEMI criteria- , A fib RVR, LEON in aVR with ST depressions leads II, III, aVF, V3-V6. Patient with continued chest pressure. Pt s/p load with ASA/Heparin as per ACS protocol, 2.5 mg IVP Lopressor x1 and 25 mg PO Lopressor for rate control. Troponin 48.  7/12 patient had Cardiac Cath which revealed 3VCAD  7/13 Tx to [facility] for CABG on 7/14 by Dr. Barron. Of note P2Y12 was 142.  7/14 VSS: RSR 60-80; continue asa/ hep gttp/ bb and statin; heparin gttp for AC; endo consulted for uncontrolled dm- aic 8.2; p2y12 260  7/15 VSS; RSR 60-70; pt denies cp/ sob; continue current medication regimen   OR monday 7/17 with Dr. Barron

## 2023-07-15 NOTE — DIETITIAN INITIAL EVALUATION ADULT - REASON INDICATOR FOR ASSESSMENT
Pt seen for consult for MST score 2/>. Information obtained from pt, RN, electronic medical record, daughter at bedside. Chart reviewed, events noted.

## 2023-07-15 NOTE — DIETITIAN INITIAL EVALUATION ADULT - REASON FOR ADMISSION
Atherosclerosis of native coronary artery without angina pectoris    Per chart: "Patient is 63 year old male with PMHx HTN, T2DM on metformin who presents to [facility] ED after +stress test 7/11 at 2pm. Patient afterwards ~@1530 began complaining of severe chest pressure with radiation to L arm/shoulder and generalized weakness. Pt states he had similar chest pain ~1 month ago but notes chest pain at that time resolved. Patient admits to extensive family history of CAD. Patient denies SOB, palpitations, history of CAD, HF, or recent illness. Patient follows with cardiologist Dr. Posada.  Patient received Brilinta 180 mg x1 in Dr. Posada's office,   ED course- cardiology was called initially for STEMI alert. Upon review of EKG, pt does not meet STEMI criteria- , A fib RVR, LEON in aVR with ST depressions leads II, III, aVF, V3-V6. Patient with continued chest pressure. Pt s/p load with ASA/Heparin as per ACS protocol, 2.5 mg IVP Lopressor x1 and 25 mg PO Lopressor for rate control. Troponin 48.  7/12 patient had Cardiac Cath which revealed 3VCAD. Now for Tx to [facility] for Cardiac Surgery Evaluation."

## 2023-07-15 NOTE — DIETITIAN INITIAL EVALUATION ADULT - ENERGY INTAKE
Fair (50-75%) Pt reports fair PO intake due to dislike some foods provided. Menu provided to pt/family at bedside, made aware of menu ordering procedure in house, encourage pt to order preferred foods from the menu to ensure adequate PO intake while in house. Preferences noted, no pork, halal.

## 2023-07-15 NOTE — DIETITIAN INITIAL EVALUATION ADULT - SIGNS/SYMPTOMS
pt asking for diet-related question and receptive to diet education elevated Glucose levels and HbA1c

## 2023-07-15 NOTE — PROGRESS NOTE ADULT - ASSESSMENT
Assessment  DMT2: 63y Male with DM T2 with hyperglycemia, A1C 8.2%, was on oral meds at home, now on coverage sliding scale, awaiting CABG.  Will need tight glycemic control for postop wound healing.   CAD: on medications, stable, monitored. Pending cabg, p2y12 fu  HTN: on antihypertensive medications, monitored, asymptomatic.  HLD: on high intensity statin.     Discussed plan and management wit Dr Lonnie Fleming MD  Cell: [phone]  Office: [phone]               Assessment  DMT2: 63y Male with DM T2 with hyperglycemia, A1C 8.2%, was on oral meds at home, now on coverage sliding scale,  awaiting CABG.  Will need tight glycemic control for postop wound healing.   CAD: on medications, stable, monitored. Pending cabg, p2y12 fu  HTN: on antihypertensive medications, monitored, asymptomatic.  HLD: on high intensity statin.     Discussed plan and management wit Dr Lonnie Fleming MD  Cell: [phone]  Office: [phone]

## 2023-07-15 NOTE — DIETITIAN INITIAL EVALUATION ADULT - ADD RECOMMEND
-- Monitor PO intake, GI tolerance, skin integrity, labs, weight, and bowel movement regularity.   -- Will continue to honor food and beverage preferences within diet restriction of patient in an effort to maximize level of nutrient intake.  -- Assist with meals PRN and encourage PO intake.  -- Reinforce nutrition education as able.  -- Monitor PO intake, GI tolerance, skin integrity, labs, weight, and bowel movement regularity.   -- Will continue to honor food and beverage preferences within diet restriction of patient in an effort to maximize level of nutrient intake.  -- Assist with meals PRN and encourage PO intake.  -- Reinforce nutrition education as needed.

## 2023-07-15 NOTE — DIETITIAN INITIAL EVALUATION ADULT - REASON
Pt appears well nourished, denies history of wt loss and decreased PO intake. No overt signs of muscle and/or fat depletion noted upon visual assessment.

## 2023-07-15 NOTE — PROGRESS NOTE ADULT - SUBJECTIVE AND OBJECTIVE BOX
Chief complaint  Patient is a 63y old  Male who presents with a chief complaint of CAD (15 Jul 2023 09:17)         Labs and Fingersticks  CAPILLARY BLOOD GLUCOSE      POCT Blood Glucose.: 107 mg/dL (15 Jul 2023 07:21)  POCT Blood Glucose.: 118 mg/dL (14 Jul 2023 21:21)  POCT Blood Glucose.: 111 mg/dL (14 Jul 2023 16:36)  POCT Blood Glucose.: 114 mg/dL (14 Jul 2023 11:01)      Anion Gap: 13 (07-15 @ 05:10)  Anion Gap: 15 (07-14 @ 06:36)      Calcium: 9.5 (07-15 @ 05:10)  Calcium: 9.5 (07-14 @ 06:36)  Albumin: 4.1 (07-14 @ 06:36)    Alanine Aminotransferase (ALT/SGPT): 17 (07-14 @ 06:36)  Alkaline Phosphatase: 72 (07-14 @ 06:36)  Aspartate Aminotransferase (AST/SGOT): 17 (07-14 @ 06:36)        07-15    139  |  104  |  13  ----------------------------<  122<H>  3.9   |  22  |  0.92    Ca    9.5      15 Jul 2023 05:10    TPro  6.8  /  Alb  4.1  /  TBili  0.3  /  DBili  x   /  AST  17  /  ALT  17  /  AlkPhos  72  07-14                        13.6   4.84  )-----------( 186      ( 15 Jul 2023 05:10 )             40.6     Medications  MEDICATIONS  (STANDING):  amLODIPine   Tablet 10 milliGRAM(s) Oral daily  aspirin enteric coated 81 milliGRAM(s) Oral daily  atorvastatin 20 milliGRAM(s) Oral at bedtime  dextrose 5%. 1000 milliLiter(s) (50 mL/Hr) IV Continuous <Continuous>  dextrose 50% Injectable 25 Gram(s) IV Push once  famotidine    Tablet 20 milliGRAM(s) Oral daily  glucagon  Injectable 1 milliGRAM(s) IntraMuscular once  heparin  Infusion 1200 Unit(s)/Hr (12 mL/Hr) IV Continuous <Continuous>  insulin lispro (ADMELOG) corrective regimen sliding scale   SubCutaneous three times a day before meals  insulin lispro (ADMELOG) corrective regimen sliding scale   SubCutaneous at bedtime  metoprolol tartrate 12.5 milliGRAM(s) Oral every 12 hours  sodium chloride 0.9% lock flush 3 milliLiter(s) IV Push every 8 hours      Physical Exam  General: Patient comfortable in bed   Vital Signs Last 12 Hrs  T(F): 98 (07-15-23 @ 04:41), Max: 98 (07-15-23 @ 04:41)  HR: 73 (07-15-23 @ 04:41) (73 - 73)  BP: 117/73 (07-15-23 @ 04:41) (117/73 - 117/73)  BP(mean): --  RR: 18 (07-15-23 @ 04:41) (18 - 18)  SpO2: 97% (07-15-23 @ 04:41) (97% - 97%)    CVS: S1S2   Respiratory: No wheezing, no crepitations  GI: Abdomen soft, bowel sounds positive  Musculoskeletal:  moves all extremities  : Voiding     Chief complaint  Patient is a 63y old  Male who presents with a chief complaint of CAD (15 Jul 2023 09:17)         Labs and Fingersticks  CAPILLARY BLOOD GLUCOSE      POCT Blood Glucose.: 107 mg/dL (15 Jul 2023 07:21)  POCT Blood Glucose.: 118 mg/dL (14 Jul 2023 21:21)  POCT Blood Glucose.: 111 mg/dL (14 Jul 2023 16:36)  POCT Blood Glucose.: 114 mg/dL (14 Jul 2023 11:01)      Anion Gap: 13 (07-15 @ 05:10)  Anion Gap: 15 (07-14 @ 06:36)      Calcium: 9.5 (07-15 @ 05:10)  Calcium: 9.5 (07-14 @ 06:36)  Albumin: 4.1 (07-14 @ 06:36)    Alanine Aminotransferase (ALT/SGPT): 17 (07-14 @ 06:36)  Alkaline Phosphatase: 72 (07-14 @ 06:36)  Aspartate Aminotransferase (AST/SGOT): 17 (07-14 @ 06:36)        07-15    139  |  104  |  13  ----------------------------<  122<H>  3.9   |  22  |  0.92    Ca    9.5      15 Jul 2023 05:10    TPro  6.8  /  Alb  4.1  /  TBili  0.3  /  DBili  x   /  AST  17  /  ALT  17  /  AlkPhos  72  07-14                        13.6   4.84  )-----------( 186      ( 15 Jul 2023 05:10 )             40.6     Medications  MEDICATIONS  (STANDING):  amLODIPine   Tablet 10 milliGRAM(s) Oral daily  aspirin enteric coated 81 milliGRAM(s) Oral daily  atorvastatin 20 milliGRAM(s) Oral at bedtime  dextrose 5%. 1000 milliLiter(s) (50 mL/Hr) IV Continuous <Continuous>  dextrose 50% Injectable 25 Gram(s) IV Push once  famotidine    Tablet 20 milliGRAM(s) Oral daily  glucagon  Injectable 1 milliGRAM(s) IntraMuscular once  heparin  Infusion 1200 Unit(s)/Hr (12 mL/Hr) IV Continuous <Continuous>  insulin lispro (ADMELOG) corrective regimen sliding scale   SubCutaneous three times a day before meals  insulin lispro (ADMELOG) corrective regimen sliding scale   SubCutaneous at bedtime  metoprolol tartrate 12.5 milliGRAM(s) Oral every 12 hours  sodium chloride 0.9% lock flush 3 milliLiter(s) IV Push every 8 hours      Physical Exam  General: Patient comfortable in bed   Vital Signs Last 12 Hrs  T(F): 98 (07-15-23 @ 04:41), Max: 98 (07-15-23 @ 04:41)  HR: 73 (07-15-23 @ 04:41) (73 - 73)  BP: 117/73 (07-15-23 @ 04:41) (117/73 - 117/73)  BP(mean): --  RR: 18 (07-15-23 @ 04:41) (18 - 18)  SpO2: 97% (07-15-23 @ 04:41) (97% - 97%)    CVS: S1S2   Respiratory: No wheezing, no crepitations  GI: Abdomen soft, bowel sounds positive  Musculoskeletal:  moves all extremities  : Voiding

## 2023-07-15 NOTE — DIETITIAN INITIAL EVALUATION ADULT - NSFNSADHERENCEPTAFT_GEN_A_CORE
Pt states he is newly dx DM 1 month ago, not on fingerstick at home. Reports taking Metformin and Semaglutide PTA. Most recent HbA1c of 8.2 on 7/12/23 indicates suboptimal control.

## 2023-07-15 NOTE — DIETITIAN INITIAL EVALUATION ADULT - PERTINENT MEDS FT
MEDICATIONS  (STANDING):  amLODIPine   Tablet 10 milliGRAM(s) Oral daily  aspirin enteric coated 81 milliGRAM(s) Oral daily  atorvastatin 20 milliGRAM(s) Oral at bedtime  dextrose 5%. 1000 milliLiter(s) (50 mL/Hr) IV Continuous <Continuous>  dextrose 50% Injectable 25 Gram(s) IV Push once  famotidine    Tablet 20 milliGRAM(s) Oral daily  glucagon  Injectable 1 milliGRAM(s) IntraMuscular once  heparin  Infusion 1200 Unit(s)/Hr (12 mL/Hr) IV Continuous <Continuous>  insulin lispro (ADMELOG) corrective regimen sliding scale   SubCutaneous three times a day before meals  insulin lispro (ADMELOG) corrective regimen sliding scale   SubCutaneous at bedtime  metoprolol tartrate 12.5 milliGRAM(s) Oral every 12 hours  sodium chloride 0.9% lock flush 3 milliLiter(s) IV Push every 8 hours    MEDICATIONS  (PRN):  dextrose 50% Injectable 12.5 Gram(s) IV Push once PRN low blood glucose  dextrose Oral Gel 15 Gram(s) Oral once PRN Blood Glucose LESS THAN 70 milliGRAM(s)/deciliter

## 2023-07-15 NOTE — DIETITIAN INITIAL EVALUATION ADULT - EDUCATION DIETARY MODIFICATIONS
Provided education on Carbohydrate Consistent diet including sources of carbohydrates, portion sizes, pairing protein with carbohydrates, limiting sugar sweetened beverages in diet and the importance of consistent eating pattern to help optimize glycemic control. Discussed DASH diet education. Reviewed foods high in Na and cholesterol to avoid. Reviewed ways to decrease Na in your diet, discussed meal and snack options, tips for eating out; provided written Heart Healthy Eating Nutrition Therapy handout to Pt./(1) partially meets; needs review/practice/verbalization

## 2023-07-15 NOTE — DIETITIAN INITIAL EVALUATION ADULT - NSICDXPASTMEDICALHX_GEN_ALL_CORE_FT
Despite some risk factors, the patient does not demonstrate definitive evidence of glaucoma at this time. PAST MEDICAL HISTORY:  Diabetes mellitus     GERD (gastroesophageal reflux disease)     HTN (hypertension)     Hyperlipidemia

## 2023-07-15 NOTE — DIETITIAN INITIAL EVALUATION ADULT - LITERATURE/VIDEOS GIVEN
Carbohydrate Counting for People with Diabetes, Diabetes Label Reading Nutrition Tips, Plate Method for Diabetes   Heart Healthy Nutrition Therapy, Sodium Content of Foods, Sodium Free Flavoring Tips

## 2023-07-15 NOTE — DIETITIAN INITIAL EVALUATION ADULT - NSFNSGIIOFT_GEN_A_CORE
Pt confirms Ht 66"  Dosing wt: 82.1kg (7/13)   UBW: 185lb, denies any history of wt loss PTA.   Wt from current admission (standing): 181.4lb (7/15), 183.2lb (7/14)   RD will continue to monitor wt trends as available/able.   Wt history from previous admission: no history   Wt history per [facility] HIE: 84kg (7/11/23)     IBW: 156lb (142lb + 10% due to overwt).

## 2023-07-15 NOTE — DIETITIAN INITIAL EVALUATION ADULT - OTHER INFO
-- Pt is on Amlodipine, Metoprolol, Atorvastatin, Pepcid.   -- Pt is on SSI (Lispro) to regulate blood glucose.   -- Pending CABG.

## 2023-07-16 ENCOUNTER — TRANSCRIPTION ENCOUNTER (OUTPATIENT)
Age: 63
End: 2023-07-16

## 2023-07-16 LAB
ANION GAP SERPL CALC-SCNC: 14 MMOL/L — SIGNIFICANT CHANGE UP (ref 5–17)
APTT BLD: 74.4 SEC — HIGH (ref 27.5–35.5)
BLD GP AB SCN SERPL QL: NEGATIVE — SIGNIFICANT CHANGE UP
BUN SERPL-MCNC: 10 MG/DL — SIGNIFICANT CHANGE UP (ref 7–23)
CALCIUM SERPL-MCNC: 9.5 MG/DL — SIGNIFICANT CHANGE UP (ref 8.4–10.5)
CHLORIDE SERPL-SCNC: 106 MMOL/L — SIGNIFICANT CHANGE UP (ref 96–108)
CO2 SERPL-SCNC: 20 MMOL/L — LOW (ref 22–31)
CREAT SERPL-MCNC: 0.99 MG/DL — SIGNIFICANT CHANGE UP (ref 0.5–1.3)
EGFR: 86 ML/MIN/1.73M2 — SIGNIFICANT CHANGE UP
GLUCOSE BLDC GLUCOMTR-MCNC: 109 MG/DL — HIGH (ref 70–99)
GLUCOSE BLDC GLUCOMTR-MCNC: 116 MG/DL — HIGH (ref 70–99)
GLUCOSE BLDC GLUCOMTR-MCNC: 127 MG/DL — HIGH (ref 70–99)
GLUCOSE BLDC GLUCOMTR-MCNC: 172 MG/DL — HIGH (ref 70–99)
GLUCOSE SERPL-MCNC: 110 MG/DL — HIGH (ref 70–99)
HCT VFR BLD CALC: 41.4 % — SIGNIFICANT CHANGE UP (ref 39–50)
HGB BLD-MCNC: 13.6 G/DL — SIGNIFICANT CHANGE UP (ref 13–17)
MCHC RBC-ENTMCNC: 28.1 PG — SIGNIFICANT CHANGE UP (ref 27–34)
MCHC RBC-ENTMCNC: 32.9 GM/DL — SIGNIFICANT CHANGE UP (ref 32–36)
MCV RBC AUTO: 85.5 FL — SIGNIFICANT CHANGE UP (ref 80–100)
NRBC # BLD: 0 /100 WBCS — SIGNIFICANT CHANGE UP (ref 0–0)
PLATELET # BLD AUTO: 173 K/UL — SIGNIFICANT CHANGE UP (ref 150–400)
POTASSIUM SERPL-MCNC: 3.8 MMOL/L — SIGNIFICANT CHANGE UP (ref 3.5–5.3)
POTASSIUM SERPL-SCNC: 3.8 MMOL/L — SIGNIFICANT CHANGE UP (ref 3.5–5.3)
RBC # BLD: 4.84 M/UL — SIGNIFICANT CHANGE UP (ref 4.2–5.8)
RBC # FLD: 12.4 % — SIGNIFICANT CHANGE UP (ref 10.3–14.5)
RH IG SCN BLD-IMP: POSITIVE — SIGNIFICANT CHANGE UP
SODIUM SERPL-SCNC: 140 MMOL/L — SIGNIFICANT CHANGE UP (ref 135–145)
WBC # BLD: 4.75 K/UL — SIGNIFICANT CHANGE UP (ref 3.8–10.5)
WBC # FLD AUTO: 4.75 K/UL — SIGNIFICANT CHANGE UP (ref 3.8–10.5)

## 2023-07-16 RX ORDER — ASCORBIC ACID 60 MG
2000 TABLET,CHEWABLE ORAL ONCE
Refills: 0 | Status: COMPLETED | OUTPATIENT
Start: 2023-07-16 | End: 2023-07-16

## 2023-07-16 RX ORDER — CHLORHEXIDINE GLUCONATE 213 G/1000ML
15 SOLUTION TOPICAL ONCE
Refills: 0 | Status: COMPLETED | OUTPATIENT
Start: 2023-07-16 | End: 2023-07-17

## 2023-07-16 RX ORDER — ACETAMINOPHEN 500 MG
1000 TABLET ORAL ONCE
Refills: 0 | Status: COMPLETED | OUTPATIENT
Start: 2023-07-16 | End: 2023-07-17

## 2023-07-16 RX ORDER — CEFUROXIME AXETIL 250 MG
1500 TABLET ORAL ONCE
Refills: 0 | Status: DISCONTINUED | OUTPATIENT
Start: 2023-07-16 | End: 2023-07-17

## 2023-07-16 RX ORDER — CHLORHEXIDINE GLUCONATE 213 G/1000ML
1 SOLUTION TOPICAL ONCE
Refills: 0 | Status: COMPLETED | OUTPATIENT
Start: 2023-07-16 | End: 2023-07-16

## 2023-07-16 RX ORDER — GABAPENTIN 400 MG/1
300 CAPSULE ORAL ONCE
Refills: 0 | Status: COMPLETED | OUTPATIENT
Start: 2023-07-16 | End: 2023-07-17

## 2023-07-16 RX ADMIN — CHLORHEXIDINE GLUCONATE 1 APPLICATION(S): 213 SOLUTION TOPICAL at 20:35

## 2023-07-16 RX ADMIN — ATORVASTATIN CALCIUM 20 MILLIGRAM(S): 80 TABLET, FILM COATED ORAL at 21:57

## 2023-07-16 RX ADMIN — Medication 12.5 MILLIGRAM(S): at 06:02

## 2023-07-16 RX ADMIN — HEPARIN SODIUM 12 UNIT(S)/HR: 5000 INJECTION INTRAVENOUS; SUBCUTANEOUS at 19:00

## 2023-07-16 RX ADMIN — Medication 1: at 12:06

## 2023-07-16 RX ADMIN — Medication 2000 MILLIGRAM(S): at 21:56

## 2023-07-16 RX ADMIN — SODIUM CHLORIDE 3 MILLILITER(S): 9 INJECTION INTRAMUSCULAR; INTRAVENOUS; SUBCUTANEOUS at 14:03

## 2023-07-16 RX ADMIN — Medication 81 MILLIGRAM(S): at 12:06

## 2023-07-16 RX ADMIN — HEPARIN SODIUM 12 UNIT(S)/HR: 5000 INJECTION INTRAVENOUS; SUBCUTANEOUS at 12:06

## 2023-07-16 RX ADMIN — AMLODIPINE BESYLATE 10 MILLIGRAM(S): 2.5 TABLET ORAL at 06:05

## 2023-07-16 RX ADMIN — SODIUM CHLORIDE 3 MILLILITER(S): 9 INJECTION INTRAMUSCULAR; INTRAVENOUS; SUBCUTANEOUS at 21:48

## 2023-07-16 RX ADMIN — SODIUM CHLORIDE 3 MILLILITER(S): 9 INJECTION INTRAMUSCULAR; INTRAVENOUS; SUBCUTANEOUS at 07:40

## 2023-07-16 RX ADMIN — Medication 12.5 MILLIGRAM(S): at 17:26

## 2023-07-16 RX ADMIN — FAMOTIDINE 20 MILLIGRAM(S): 10 INJECTION INTRAVENOUS at 12:06

## 2023-07-16 NOTE — PROGRESS NOTE ADULT - PROBLEM SELECTOR PLAN 1
Pre op Cardiac surgery work up in progress   c/w ASA 81, started Lopressor, Atorvastatin qHS,   Heparin gtt for AC  p2y12 260  OR monday 7/17 with Dr. Barron

## 2023-07-16 NOTE — PROGRESS NOTE ADULT - ASSESSMENT
Patient is 63 year old male with PMHx HTN, T2DM on metformin who presents to [facility] ED after +stress test 7/11 at 2pm. Patient afterwards ~@1530 began complaining of severe chest pressure with radiation to L arm/shoulder and generalized weakness. Pt states he had similar chest pain ~1 month ago but notes chest pain at that time resolved. Patient admits to extensive family history of CAD. Patient denies SOB, palpitations, history of CAD, HF, or recent illness. Patient follows with cardiologist Dr. Posada.  Patient received Brilinta 180 mg x1 in Dr. Posada's office,   ED course- cardiology was called initially for STEMI alert. Upon review of EKG, pt does not meet STEMI criteria- , A fib RVR, LEON in aVR with ST depressions leads II, III, aVF, V3-V6. Patient with continued chest pressure. Pt s/p load with ASA/Heparin as per ACS protocol, 2.5 mg IVP Lopressor x1 and 25 mg PO Lopressor for rate control. Troponin 48.  7/12 patient had Cardiac Cath which revealed 3VCAD  7/13 Tx to [facility] for CABG on 7/14 by Dr. Barron. Of note P2Y12 was 142.  7/14 VSS: RSR 60-80; continue asa/ hep gttp/ bb and statin; heparin gttp for AC; endo consulted for uncontrolled dm- aic 8.2; p2y12 260  7/15 VSS; RSR 60-70; pt denies cp/ sob; continue current medication regimen   7/16 NPO after midnight for OR monday 7/17 with Dr. Barron

## 2023-07-16 NOTE — PROGRESS NOTE ADULT - ASSESSMENT
Assessment  DMT2: 63y Male with DM T2 with hyperglycemia, A1C 8.2%, was on oral meds at home, now on coverage sliding scale,  awaiting CABG. Glucose trending stable.   Will need tight glycemic control for postop wound healing.   CAD: on medications, stable, monitored. Pending cabg, p2y12 fu  HTN: on antihypertensive medications, monitored, asymptomatic.  HLD: on high intensity statin.     Discussed plan and management wit Dr Lonnie Renee NP-TEAMS  Mellissa Fleming MD  Cell: [phone]  Office: [phone]               Assessment  DMT2: 63y Male with DM T2 with hyperglycemia, A1C 8.2%, was on oral meds at home, now on coverage sliding scale,  awaiting CABG.  Glucose trending stable.   Will need tight glycemic control for postop wound healing.   CAD: on medications, stable, monitored. Pending cabg, p2y12 fu  HTN: on antihypertensive medications, monitored, asymptomatic.  HLD: on high intensity statin.     Discussed plan and management wit Dr Lonnie Renee NP-TEAMS  Mellissa Fleming MD  Cell: [phone]  Office: [phone]

## 2023-07-16 NOTE — PROGRESS NOTE ADULT - SUBJECTIVE AND OBJECTIVE BOX
Chief complaint  Patient is a 63y old  Male who presents with a chief complaint of CAD (16 Jul 2023 06:18)         Labs and Fingersticks  CAPILLARY BLOOD GLUCOSE      POCT Blood Glucose.: 172 mg/dL (16 Jul 2023 11:40)  POCT Blood Glucose.: 109 mg/dL (16 Jul 2023 07:49)  POCT Blood Glucose.: 110 mg/dL (15 Jul 2023 21:20)  POCT Blood Glucose.: 114 mg/dL (15 Jul 2023 16:41)      Anion Gap: 14 (07-16 @ 06:56)  Anion Gap: 13 (07-15 @ 05:10)      Calcium: 9.5 (07-16 @ 06:56)  Calcium: 9.5 (07-15 @ 05:10)          07-16    140  |  106  |  10  ----------------------------<  110<H>  3.8   |  20<L>  |  0.99    Ca    9.5      16 Jul 2023 06:56                          13.6   4.75  )-----------( 173      ( 16 Jul 2023 06:56 )             41.4     Medications  MEDICATIONS  (STANDING):  acetaminophen     Tablet .. 1000 milliGRAM(s) Oral once  amLODIPine   Tablet 10 milliGRAM(s) Oral daily  ascorbic acid 2000 milliGRAM(s) Oral once  aspirin enteric coated 81 milliGRAM(s) Oral daily  atorvastatin 20 milliGRAM(s) Oral at bedtime  cefuroxime  IVPB 1500 milliGRAM(s) IV Intermittent once  chlorhexidine 0.12% Liquid 15 milliLiter(s) Swish and Spit once  chlorhexidine 4% Liquid 1 Application(s) Topical once  dextrose 5%. 1000 milliLiter(s) (50 mL/Hr) IV Continuous <Continuous>  dextrose 50% Injectable 25 Gram(s) IV Push once  famotidine    Tablet 20 milliGRAM(s) Oral daily  gabapentin 300 milliGRAM(s) Oral once  glucagon  Injectable 1 milliGRAM(s) IntraMuscular once  heparin  Infusion 1200 Unit(s)/Hr (12 mL/Hr) IV Continuous <Continuous>  insulin lispro (ADMELOG) corrective regimen sliding scale   SubCutaneous three times a day before meals  insulin lispro (ADMELOG) corrective regimen sliding scale   SubCutaneous at bedtime  metoprolol tartrate 12.5 milliGRAM(s) Oral every 12 hours  sodium chloride 0.9% lock flush 3 milliLiter(s) IV Push every 8 hours      Physical Exam  General: Patient comfortable in bed   Vital Signs Last 12 Hrs  T(F): 98.4 (07-16-23 @ 12:25), Max: 98.4 (07-16-23 @ 12:25)  HR: 73 (07-16-23 @ 12:25) (68 - 73)  BP: 119/68 (07-16-23 @ 12:25) (108/66 - 119/68)  BP(mean): 85 (07-16-23 @ 12:25) (85 - 85)  RR: 18 (07-16-23 @ 12:25) (18 - 18)  SpO2: 98% (07-16-23 @ 12:25) (98% - 99%)    CVS: S1S2   Respiratory: No wheezing, no crepitations  GI: Abdomen soft, bowel sounds positive  Musculoskeletal:  moves all extremities  : Voiding        Chief complaint  Patient is a 63y old  Male who presents with a chief complaint of CAD (16 Jul 2023 06:18)         Labs and Fingersticks  CAPILLARY BLOOD GLUCOSE      POCT Blood Glucose.: 172 mg/dL (16 Jul 2023 11:40)  POCT Blood Glucose.: 109 mg/dL (16 Jul 2023 07:49)  POCT Blood Glucose.: 110 mg/dL (15 Jul 2023 21:20)  POCT Blood Glucose.: 114 mg/dL (15 Jul 2023 16:41)      Anion Gap: 14 (07-16 @ 06:56)  Anion Gap: 13 (07-15 @ 05:10)      Calcium: 9.5 (07-16 @ 06:56)  Calcium: 9.5 (07-15 @ 05:10)          07-16    140  |  106  |  10  ----------------------------<  110<H>  3.8   |  20<L>  |  0.99    Ca    9.5      16 Jul 2023 06:56                          13.6   4.75  )-----------( 173      ( 16 Jul 2023 06:56 )             41.4     Medications  MEDICATIONS  (STANDING):  acetaminophen     Tablet .. 1000 milliGRAM(s) Oral once  amLODIPine   Tablet 10 milliGRAM(s) Oral daily  ascorbic acid 2000 milliGRAM(s) Oral once  aspirin enteric coated 81 milliGRAM(s) Oral daily  atorvastatin 20 milliGRAM(s) Oral at bedtime  cefuroxime  IVPB 1500 milliGRAM(s) IV Intermittent once  chlorhexidine 0.12% Liquid 15 milliLiter(s) Swish and Spit once  chlorhexidine 4% Liquid 1 Application(s) Topical once  dextrose 5%. 1000 milliLiter(s) (50 mL/Hr) IV Continuous <Continuous>  dextrose 50% Injectable 25 Gram(s) IV Push once  famotidine    Tablet 20 milliGRAM(s) Oral daily  gabapentin 300 milliGRAM(s) Oral once  glucagon  Injectable 1 milliGRAM(s) IntraMuscular once  heparin  Infusion 1200 Unit(s)/Hr (12 mL/Hr) IV Continuous <Continuous>  insulin lispro (ADMELOG) corrective regimen sliding scale   SubCutaneous three times a day before meals  insulin lispro (ADMELOG) corrective regimen sliding scale   SubCutaneous at bedtime  metoprolol tartrate 12.5 milliGRAM(s) Oral every 12 hours  sodium chloride 0.9% lock flush 3 milliLiter(s) IV Push every 8 hours      Physical Exam  General: Patient comfortable in bed   Vital Signs Last 12 Hrs  T(F): 98.4 (07-16-23 @ 12:25), Max: 98.4 (07-16-23 @ 12:25)  HR: 73 (07-16-23 @ 12:25) (68 - 73)  BP: 119/68 (07-16-23 @ 12:25) (108/66 - 119/68)  BP(mean): 85 (07-16-23 @ 12:25) (85 - 85)  RR: 18 (07-16-23 @ 12:25) (18 - 18)  SpO2: 98% (07-16-23 @ 12:25) (98% - 99%)    CVS: S1S2   Respiratory: No wheezing, no crepitations  GI: Abdomen soft, bowel sounds positive  Musculoskeletal:  moves all extremities  : Voiding

## 2023-07-16 NOTE — PROGRESS NOTE ADULT - SUBJECTIVE AND OBJECTIVE BOX
Cardiac Surgery Pre-op Note:  CC: Patient is a 63y old  Male who presents with a chief complaint of Atherosclerosis of native coronary artery without angina pectoris    Per chart: "Patient is 63 year old male with PMHx HTN, T2DM on metformin who presents to [facility] ED after +stress test 7/11 at 2pm. Patient afterwards ~@1530 began complaining of severe chest pressure with radiation to L arm/shoulder and generalized weakness. Pt states he had similar chest pain ~1 month ago but notes chest pain at that time resolved. Patient admits to extensive family history of CAD. Patient denies SOB, palpitations, history of CAD, HF, or recent illness. Patient follows with cardiologist Dr. Posada.  Patient received Brilinta 180 mg x1 in Dr. Posada's office,   ED course- cardiology was called initially for STEMI alert. Upon review of EKG, pt does not meet STEMI criteria- , A fib RVR, LEON in aVR with ST depressions leads II, III, aVF, V3-V6. Patient with continued chest pressure. Pt s/p load with ASA/Heparin as per ACS protocol, 2.5 mg IVP Lopressor x1 and 25 mg PO Lopressor for rate control. Troponin 48.  7/12 patient had Cardiac Cath which revealed 3VCAD. Now for Tx to [facility] for Cardiac Surgery Evaluation." (15 Jul 2023 10:51)    Referring Physician:                                                                                             Surgeon: DR. Barron  Procedure: (Date) (Procedure) cabg    Allergies: No Known Allergies    HPI:  Patient is 63 year old male with PMHx HTN, T2DM on metformin who presents to [facility] ED after +stress test 7/11 at 2pm. Patient afterwards ~@1530 began complaining of severe chest pressure with radiation to L arm/shoulder and generalized weakness. Pt states he had similar chest pain ~1 month ago but notes chest pain at that time resolved. Patient admits to extensive family history of CAD. Patient denies SOB, palpitations, history of CAD, HF, or recent illness. Patient follows with cardiologist Dr. Posada.  Patient received Brilinta 180 mg x1 in Dr. Posada's office,   ED course- cardiology was called initially for STEMI alert. Upon review of EKG, pt does not meet STEMI criteria- , A fib RVR, LEON in aVR with ST depressions leads II, III, aVF, V3-V6. Patient with continued chest pressure. Pt s/p load with ASA/Heparin as per ACS protocol, 2.5 mg IVP Lopressor x1 and 25 mg PO Lopressor for rate control. Troponin 48.  7/12 patient had Cardiac Cath which revealed 3VCAD. Now for Tx to [facility] for Cardiac Surgery Evaluation.     (13 Jul 2023 22:24)    PAST MEDICAL & SURGICAL HISTORY:  HTN (hypertension)      Diabetes mellitus      GERD (gastroesophageal reflux disease)      Hyperlipidemia      No significant past surgical history      MEDICATIONS  (STANDING):  acetaminophen     Tablet .. 1000 milliGRAM(s) Oral once  amLODIPine   Tablet 10 milliGRAM(s) Oral daily  ascorbic acid 2000 milliGRAM(s) Oral once  aspirin enteric coated 81 milliGRAM(s) Oral daily  atorvastatin 20 milliGRAM(s) Oral at bedtime  cefuroxime  IVPB 1500 milliGRAM(s) IV Intermittent once  chlorhexidine 0.12% Liquid 15 milliLiter(s) Swish and Spit once  chlorhexidine 4% Liquid 1 Application(s) Topical once  dextrose 5%. 1000 milliLiter(s) (50 mL/Hr) IV Continuous <Continuous>  dextrose 50% Injectable 25 Gram(s) IV Push once  famotidine    Tablet 20 milliGRAM(s) Oral daily  gabapentin 300 milliGRAM(s) Oral once  glucagon  Injectable 1 milliGRAM(s) IntraMuscular once  heparin  Infusion 1200 Unit(s)/Hr (12 mL/Hr) IV Continuous <Continuous>  insulin lispro (ADMELOG) corrective regimen sliding scale   SubCutaneous three times a day before meals  insulin lispro (ADMELOG) corrective regimen sliding scale   SubCutaneous at bedtime  metoprolol tartrate 12.5 milliGRAM(s) Oral every 12 hours  sodium chloride 0.9% lock flush 3 milliLiter(s) IV Push every 8 hours    MEDICATIONS  (PRN):  dextrose 50% Injectable 12.5 Gram(s) IV Push once PRN low blood glucose  dextrose Oral Gel 15 Gram(s) Oral once PRN Blood Glucose LESS THAN 70 milliGRAM(s)/deciliter    Labs:                        13.6   4.84  )-----------( 186      ( 15 Jul 2023 05:10 )             40.6     139  |  104  |  13  ----------------------------<  122<H>  3.9   |  22  |  0.92    Ca    9.5      15 Jul 2023 05:10    TPro  6.8  /  Alb  4.1  /  TBili  0.3  /  DBili  x   /  AST  17  /  ALT  17  /  AlkPhos  72  07-14    PTT - ( 15 Jul 2023 05:10 )  PTT:82.3 sec    Blood Type: ABO Interpretation: B (07-14 @ 06:42)    HGB A1C: A1C with Estimated Average Glucose (07.12.23 @ 03:35)    A1C with Estimated Average Glucose Result: 8.2: High Risk (prediabetic)    5.7 - 6.4 %  Diabetic, diagnostic           > 6.5 %  ADA diabetic treatment goal    < 7.0 %  HbA1C values may not accurately reflect mean blood glucose in patients  with Hb variants.  Suggest clinical correlation. %   Estimated Average Glucose: 189    Prealbumin: Prealbumin, Serum: 23 mg/dL (07-13 @ 22:55)    Thyroid Panel: 07-14 @ 06:37/--  1.5/--/--  07-11 @ 21:55/2.71  --/--/--    MRSA: MRSA PCR Result.: NotDetec (07-14 @ 05:42)   / MSSA:   Urinalysis Basic - ( 15 Jul 2023 05:10 )    Color: x / Appearance: x / SG: x / pH: x  Gluc: 122 mg/dL / Ketone: x  / Bili: x / Urobili: x   Blood: x / Protein: x / Nitrite: x   Leuk Esterase: x / RBC: x / WBC x   Sq Epi: x / Non Sq Epi: x / Bacteria: x    CXR: < from: Xray Chest 1 View AP/PA (07.14.23 @ 09:49) >  Frontal expiratory view of the chest shows the heart to be similar in   size. The lungs are clear and there is no evidence of pneumothorax nor   pleural effusion.    IMPRESSION:  No active pulmonary disease.    EKG: < from: 12 Lead ECG (07.14.23 @ 05:06) >  Diagnosis Line NORMAL SINUS RHYTHM  NORMAL ECG  NO PREVIOUS ECGS AVAILABLE    Carotid Duplex:  < from: VA Duplex Carotid, Bilat (07.14.23 @ 10:08) >  IMPRESSION: No significant hemodynamic stenosis of either carotid artery.    PFT's:    Echocardiogram: < from: Transthoracic Echocardiogram (07.12.23 @ 17:06) >  CONCLUSIONS:  1. Normal mitral valve. Minimal mitral regurgitation.  2. Normal left ventricular internal dimensions and wall  thicknesses.  3. Mild segmental left ventricular systolic dysfunction.  Endocardial visualization enhanced with intravenous  injection of echo contrast (Definity). There is akinesis of  the basal-mid anterolateral wall, anterior wall,  inferolateral wall. The remaining walls are hypokinetic.  The LVEF visually is 40-45%.  4. Mild diastolic dysfunction (Stage I).  5. Normal right atrium.  6. Normal right ventricular size and function.  7. Normal pericardium with no pericardial effusion.  *** No previous Echo exam.    Cardiac catheterization: < from: Cardiac Catheterization (07.12.23 @ 11:16) >  Coronary Angiography   The coronary circulation is right dominant. Cardiac catheterization  was performed urgently.    LM   Ostial left main: There is a 20 % stenosis.      LAD   Proximal left anterior descending: The segment is moderately  calcified. There is a 70 % stenosis in the distal third portion of  the segment. BRITTNEY Flow 3.   Mid left anterior descending: There is a 50 % stenosis in the middle  third portion of the segment. BRITTNEY Flow 3.  Second diagonal: The segment is small. There is a 50 % stenosis in the  middle third portion of the segment. BRITTNEY Flow 3. Third  diagonal: The segment is small. There is a 60 % stenosis in the  proximal third portion of the segment. BRITTNEY Flow 3.    CX   Proximal circumflex: The segment is mildly calcified. The distal  vessel is supplied by faint collaterals from the Third diagonal.  There is a 100 % stenosis in the proximal third portion of the  segment. BRITTNEY Flow 0. This lesion is a chronic total occlusion.    RCA   Proximal right coronary artery: The vessel segment is supplied by  moderate bridging collaterals. The distalvessel is supplied  by moderate collaterals from the LAD septal  segments. There  is a 100 % stenosis in the middle third portion of the  segment. BRITTNEY Flow 0. This lesion is a chronic total occlusion.      Left Heart Cath   Ejection fraction is 60%.      Gen: WN/WD NAD  Neuro: AAOx3, nonfocal  Pulm: CTA B/L  CV: RRR, S1S2  Abd: Soft, NT, ND +BS  Ext: No edema, + peripheral pulses    Pt has AICD/PPM [ ] Yes  [x ] No             Brand Name:  Pre-op Beta Blocker ordered within 24 hrs of surgery (CABG ONLY)?  [x ] Yes  [ ] No  If not, Why?  Type & Cross  [x ] Yes  [ ] No  NPO after Midnight [x ] Yes  [ ] No  Pre-op ABX ordered, to be taped on chart:  [ x] Yes  [ ] No     Hibiclens/Peridex ordered [x ] Yes  [ ] No  Intraop on Hold: PRBCs, CXR, ASHLEY [x ]   Consent obtained  [x ] Yes  [ ] No

## 2023-07-17 ENCOUNTER — TRANSCRIPTION ENCOUNTER (OUTPATIENT)
Age: 63
End: 2023-07-17

## 2023-07-17 ENCOUNTER — APPOINTMENT (OUTPATIENT)
Dept: CARDIOTHORACIC SURGERY | Facility: HOSPITAL | Age: 63
End: 2023-07-17

## 2023-07-17 PROBLEM — I10 ESSENTIAL (PRIMARY) HYPERTENSION: Chronic | Status: ACTIVE | Noted: 2023-07-11

## 2023-07-17 LAB
ALBUMIN SERPL ELPH-MCNC: 4.2 G/DL — SIGNIFICANT CHANGE UP (ref 3.3–5)
ALP SERPL-CCNC: 66 U/L — SIGNIFICANT CHANGE UP (ref 40–120)
ALT FLD-CCNC: 44 U/L — SIGNIFICANT CHANGE UP (ref 10–45)
ANION GAP SERPL CALC-SCNC: 19 MMOL/L — HIGH (ref 5–17)
APTT BLD: 27.6 SEC — SIGNIFICANT CHANGE UP (ref 27.5–35.5)
AST SERPL-CCNC: 55 U/L — HIGH (ref 10–40)
BASE EXCESS BLDV CALC-SCNC: -4 MMOL/L — LOW (ref -2–3)
BASE EXCESS BLDV CALC-SCNC: 1.7 MMOL/L — SIGNIFICANT CHANGE UP (ref -2–3)
BASE EXCESS BLDV CALC-SCNC: 1.9 MMOL/L — SIGNIFICANT CHANGE UP (ref -2–3)
BASE EXCESS BLDV CALC-SCNC: 2.4 MMOL/L — SIGNIFICANT CHANGE UP (ref -2–3)
BASE EXCESS BLDV CALC-SCNC: 2.5 MMOL/L — SIGNIFICANT CHANGE UP (ref -2–3)
BASE EXCESS BLDV CALC-SCNC: 3 MMOL/L — SIGNIFICANT CHANGE UP (ref -2–3)
BASOPHILS # BLD AUTO: 0 K/UL — SIGNIFICANT CHANGE UP (ref 0–0.2)
BASOPHILS NFR BLD AUTO: 0 % — SIGNIFICANT CHANGE UP (ref 0–2)
BILIRUB SERPL-MCNC: 1 MG/DL — SIGNIFICANT CHANGE UP (ref 0.2–1.2)
BLOOD GAS VENOUS - CREATININE: SIGNIFICANT CHANGE UP MG/DL (ref 0.5–1.3)
BUN SERPL-MCNC: 10 MG/DL — SIGNIFICANT CHANGE UP (ref 7–23)
CA-I SERPL-SCNC: 0.96 MMOL/L — LOW (ref 1.15–1.33)
CA-I SERPL-SCNC: 1.01 MMOL/L — LOW (ref 1.15–1.33)
CA-I SERPL-SCNC: 1.01 MMOL/L — LOW (ref 1.15–1.33)
CA-I SERPL-SCNC: 1.02 MMOL/L — LOW (ref 1.15–1.33)
CA-I SERPL-SCNC: 1.07 MMOL/L — LOW (ref 1.15–1.33)
CA-I SERPL-SCNC: 1.47 MMOL/L — HIGH (ref 1.15–1.33)
CALCIUM SERPL-MCNC: 11.1 MG/DL — HIGH (ref 8.4–10.5)
CHLORIDE BLDV-SCNC: 100 MMOL/L — SIGNIFICANT CHANGE UP (ref 96–108)
CHLORIDE BLDV-SCNC: 102 MMOL/L — SIGNIFICANT CHANGE UP (ref 96–108)
CHLORIDE BLDV-SCNC: 102 MMOL/L — SIGNIFICANT CHANGE UP (ref 96–108)
CHLORIDE BLDV-SCNC: 103 MMOL/L — SIGNIFICANT CHANGE UP (ref 96–108)
CHLORIDE BLDV-SCNC: 103 MMOL/L — SIGNIFICANT CHANGE UP (ref 96–108)
CHLORIDE BLDV-SCNC: 105 MMOL/L — SIGNIFICANT CHANGE UP (ref 96–108)
CHLORIDE SERPL-SCNC: 102 MMOL/L — SIGNIFICANT CHANGE UP (ref 96–108)
CK MB BLD-MCNC: 4.2 % — HIGH (ref 0–3.5)
CK MB CFR SERPL CALC: 20.8 NG/ML — HIGH (ref 0–6.7)
CK SERPL-CCNC: 496 U/L — HIGH (ref 30–200)
CO2 BLDV-SCNC: 24 MMOL/L — SIGNIFICANT CHANGE UP (ref 22–26)
CO2 BLDV-SCNC: 29 MMOL/L — HIGH (ref 22–26)
CO2 BLDV-SCNC: 30 MMOL/L — HIGH (ref 22–26)
CO2 BLDV-SCNC: 30 MMOL/L — HIGH (ref 22–26)
CO2 SERPL-SCNC: 19 MMOL/L — LOW (ref 22–31)
CREAT SERPL-MCNC: 0.92 MG/DL — SIGNIFICANT CHANGE UP (ref 0.5–1.3)
EGFR: 93 ML/MIN/1.73M2 — SIGNIFICANT CHANGE UP
EOSINOPHIL # BLD AUTO: 0 K/UL — SIGNIFICANT CHANGE UP (ref 0–0.5)
EOSINOPHIL NFR BLD AUTO: 0 % — SIGNIFICANT CHANGE UP (ref 0–6)
FIBRINOGEN PPP-MCNC: 258 MG/DL — SIGNIFICANT CHANGE UP (ref 200–445)
GAS PNL BLDA: SIGNIFICANT CHANGE UP
GAS PNL BLDV: 133 MMOL/L — LOW (ref 136–145)
GAS PNL BLDV: 137 MMOL/L — SIGNIFICANT CHANGE UP (ref 136–145)
GAS PNL BLDV: 138 MMOL/L — SIGNIFICANT CHANGE UP (ref 136–145)
GAS PNL BLDV: 138 MMOL/L — SIGNIFICANT CHANGE UP (ref 136–145)
GAS PNL BLDV: SIGNIFICANT CHANGE UP
GLUCOSE BLDC GLUCOMTR-MCNC: 103 MG/DL — HIGH (ref 70–99)
GLUCOSE BLDC GLUCOMTR-MCNC: 107 MG/DL — HIGH (ref 70–99)
GLUCOSE BLDC GLUCOMTR-MCNC: 126 MG/DL — HIGH (ref 70–99)
GLUCOSE BLDC GLUCOMTR-MCNC: 134 MG/DL — HIGH (ref 70–99)
GLUCOSE BLDC GLUCOMTR-MCNC: 144 MG/DL — HIGH (ref 70–99)
GLUCOSE BLDC GLUCOMTR-MCNC: 146 MG/DL — HIGH (ref 70–99)
GLUCOSE BLDC GLUCOMTR-MCNC: 159 MG/DL — HIGH (ref 70–99)
GLUCOSE BLDC GLUCOMTR-MCNC: 172 MG/DL — HIGH (ref 70–99)
GLUCOSE BLDC GLUCOMTR-MCNC: 189 MG/DL — HIGH (ref 70–99)
GLUCOSE BLDV-MCNC: 105 MG/DL — HIGH (ref 70–99)
GLUCOSE BLDV-MCNC: 126 MG/DL — HIGH (ref 70–99)
GLUCOSE BLDV-MCNC: 128 MG/DL — HIGH (ref 70–99)
GLUCOSE BLDV-MCNC: 130 MG/DL — HIGH (ref 70–99)
GLUCOSE BLDV-MCNC: 141 MG/DL — HIGH (ref 70–99)
GLUCOSE BLDV-MCNC: 97 MG/DL — SIGNIFICANT CHANGE UP (ref 70–99)
GLUCOSE SERPL-MCNC: 190 MG/DL — HIGH (ref 70–99)
HCO3 BLDV-SCNC: 23 MMOL/L — SIGNIFICANT CHANGE UP (ref 22–29)
HCO3 BLDV-SCNC: 27 MMOL/L — SIGNIFICANT CHANGE UP (ref 22–29)
HCO3 BLDV-SCNC: 27 MMOL/L — SIGNIFICANT CHANGE UP (ref 22–29)
HCO3 BLDV-SCNC: 28 MMOL/L — SIGNIFICANT CHANGE UP (ref 22–29)
HCT VFR BLD CALC: 40.4 % — SIGNIFICANT CHANGE UP (ref 39–50)
HCT VFR BLDA CALC: 28 % — LOW (ref 39–51)
HCT VFR BLDA CALC: 29 % — LOW (ref 39–51)
HCT VFR BLDA CALC: 29 % — LOW (ref 39–51)
HCT VFR BLDA CALC: 30 % — LOW (ref 39–51)
HGB BLD CALC-MCNC: 10.1 G/DL — LOW (ref 12.6–17.4)
HGB BLD CALC-MCNC: 9.4 G/DL — LOW (ref 12.6–17.4)
HGB BLD CALC-MCNC: 9.6 G/DL — LOW (ref 12.6–17.4)
HGB BLD CALC-MCNC: 9.8 G/DL — LOW (ref 12.6–17.4)
HGB BLD CALC-MCNC: 9.9 G/DL — LOW (ref 12.6–17.4)
HGB BLD CALC-MCNC: 9.9 G/DL — LOW (ref 12.6–17.4)
HGB BLD-MCNC: 13.4 G/DL — SIGNIFICANT CHANGE UP (ref 13–17)
INR BLD: 1.21 RATIO — HIGH (ref 0.88–1.16)
LACTATE BLDV-MCNC: 0.7 MMOL/L — SIGNIFICANT CHANGE UP (ref 0.5–2)
LACTATE BLDV-MCNC: 0.9 MMOL/L — SIGNIFICANT CHANGE UP (ref 0.5–2)
LACTATE BLDV-MCNC: 1 MMOL/L — SIGNIFICANT CHANGE UP (ref 0.5–2)
LACTATE BLDV-MCNC: 1.1 MMOL/L — SIGNIFICANT CHANGE UP (ref 0.5–2)
LACTATE BLDV-MCNC: 1.1 MMOL/L — SIGNIFICANT CHANGE UP (ref 0.5–2)
LACTATE BLDV-MCNC: 1.2 MMOL/L — SIGNIFICANT CHANGE UP (ref 0.5–2)
LYMPHOCYTES # BLD AUTO: 21 % — SIGNIFICANT CHANGE UP (ref 13–44)
LYMPHOCYTES # BLD AUTO: 6.09 K/UL — HIGH (ref 1–3.3)
MAGNESIUM SERPL-MCNC: 2.3 MG/DL — SIGNIFICANT CHANGE UP (ref 1.6–2.6)
MANUAL SMEAR VERIFICATION: SIGNIFICANT CHANGE UP
MCHC RBC-ENTMCNC: 28.5 PG — SIGNIFICANT CHANGE UP (ref 27–34)
MCHC RBC-ENTMCNC: 33.2 GM/DL — SIGNIFICANT CHANGE UP (ref 32–36)
MCV RBC AUTO: 85.8 FL — SIGNIFICANT CHANGE UP (ref 80–100)
MONOCYTES # BLD AUTO: 1.74 K/UL — HIGH (ref 0–0.9)
MONOCYTES NFR BLD AUTO: 6 % — SIGNIFICANT CHANGE UP (ref 2–14)
NEUTROPHILS # BLD AUTO: 21.18 K/UL — HIGH (ref 1.8–7.4)
NEUTROPHILS NFR BLD AUTO: 71 % — SIGNIFICANT CHANGE UP (ref 43–77)
NEUTS BAND # BLD: 2 % — SIGNIFICANT CHANGE UP (ref 0–8)
NRBC # BLD: 0 /100 — SIGNIFICANT CHANGE UP (ref 0–0)
OTHER CELLS CSF MANUAL: 13.6 ML/DL — LOW (ref 18–22)
PCO2 BLDV: 43 MMHG — SIGNIFICANT CHANGE UP (ref 42–55)
PCO2 BLDV: 45 MMHG — SIGNIFICANT CHANGE UP (ref 42–55)
PCO2 BLDV: 47 MMHG — SIGNIFICANT CHANGE UP (ref 42–55)
PCO2 BLDV: 47 MMHG — SIGNIFICANT CHANGE UP (ref 42–55)
PCO2 BLDV: 49 MMHG — SIGNIFICANT CHANGE UP (ref 42–55)
PCO2 BLDV: 49 MMHG — SIGNIFICANT CHANGE UP (ref 42–55)
PH BLDV: 7.29 — LOW (ref 7.32–7.43)
PH BLDV: 7.36 — SIGNIFICANT CHANGE UP (ref 7.32–7.43)
PH BLDV: 7.37 — SIGNIFICANT CHANGE UP (ref 7.32–7.43)
PH BLDV: 7.39 — SIGNIFICANT CHANGE UP (ref 7.32–7.43)
PH BLDV: 7.39 — SIGNIFICANT CHANGE UP (ref 7.32–7.43)
PH BLDV: 7.41 — SIGNIFICANT CHANGE UP (ref 7.32–7.43)
PHOSPHATE SERPL-MCNC: 4.8 MG/DL — HIGH (ref 2.5–4.5)
PLAT MORPH BLD: NORMAL — SIGNIFICANT CHANGE UP
PLATELET # BLD AUTO: 186 K/UL — SIGNIFICANT CHANGE UP (ref 150–400)
PO2 BLDV: 56 MMHG — HIGH (ref 25–45)
PO2 BLDV: 57 MMHG — HIGH (ref 25–45)
PO2 BLDV: 61 MMHG — HIGH (ref 25–45)
PO2 BLDV: 67 MMHG — HIGH (ref 25–45)
PO2 BLDV: 73 MMHG — HIGH (ref 25–45)
PO2 BLDV: 79 MMHG — HIGH (ref 25–45)
POTASSIUM BLDV-SCNC: 3.7 MMOL/L — SIGNIFICANT CHANGE UP (ref 3.5–5.1)
POTASSIUM BLDV-SCNC: 3.7 MMOL/L — SIGNIFICANT CHANGE UP (ref 3.5–5.1)
POTASSIUM BLDV-SCNC: 3.8 MMOL/L — SIGNIFICANT CHANGE UP (ref 3.5–5.1)
POTASSIUM BLDV-SCNC: 3.8 MMOL/L — SIGNIFICANT CHANGE UP (ref 3.5–5.1)
POTASSIUM BLDV-SCNC: 3.9 MMOL/L — SIGNIFICANT CHANGE UP (ref 3.5–5.1)
POTASSIUM BLDV-SCNC: 3.9 MMOL/L — SIGNIFICANT CHANGE UP (ref 3.5–5.1)
POTASSIUM SERPL-MCNC: 4.4 MMOL/L — SIGNIFICANT CHANGE UP (ref 3.5–5.3)
POTASSIUM SERPL-SCNC: 4.4 MMOL/L — SIGNIFICANT CHANGE UP (ref 3.5–5.3)
PROT SERPL-MCNC: 6.6 G/DL — SIGNIFICANT CHANGE UP (ref 6–8.3)
PROTHROM AB SERPL-ACNC: 14.1 SEC — HIGH (ref 10.5–13.4)
RBC # BLD: 4.71 M/UL — SIGNIFICANT CHANGE UP (ref 4.2–5.8)
RBC # FLD: 12.5 % — SIGNIFICANT CHANGE UP (ref 10.3–14.5)
RBC BLD AUTO: SIGNIFICANT CHANGE UP
SAO2 % BLDV: 87.8 % — SIGNIFICANT CHANGE UP (ref 67–88)
SAO2 % BLDV: 90.7 % — HIGH (ref 67–88)
SAO2 % BLDV: 94.7 % — HIGH (ref 67–88)
SAO2 % BLDV: 94.7 % — HIGH (ref 67–88)
SAO2 % BLDV: 95.5 % — HIGH (ref 67–88)
SAO2 % BLDV: 96.4 % — HIGH (ref 67–88)
SODIUM SERPL-SCNC: 140 MMOL/L — SIGNIFICANT CHANGE UP (ref 135–145)
TROPONIN T, HIGH SENSITIVITY RESULT: 205 NG/L — HIGH (ref 0–51)
WBC # BLD: 29.02 K/UL — HIGH (ref 3.8–10.5)
WBC # FLD AUTO: 29.02 K/UL — HIGH (ref 3.8–10.5)

## 2023-07-17 PROCEDURE — 33508 ENDOSCOPIC VEIN HARVEST: CPT | Mod: 59

## 2023-07-17 PROCEDURE — 33518 CABG ARTERY-VEIN TWO: CPT

## 2023-07-17 PROCEDURE — 33533 CABG ARTERIAL SINGLE: CPT

## 2023-07-17 PROCEDURE — 93010 ELECTROCARDIOGRAM REPORT: CPT

## 2023-07-17 DEVICE — HEMASORB ABS BONE PUTTY PLUS: Type: IMPLANTABLE DEVICE | Status: FUNCTIONAL

## 2023-07-17 DEVICE — CANNULA AORTIC PERFUSION EZ GLIDE STRAIGHT 21FR X 35CM NON-VENTED: Type: IMPLANTABLE DEVICE | Status: FUNCTIONAL

## 2023-07-17 DEVICE — CANNULA RCSP 15FR X 12.5" AUTO-INFLATE CUFF WITH SOLID STYLET: Type: IMPLANTABLE DEVICE | Status: FUNCTIONAL

## 2023-07-17 DEVICE — CANNULA VENOUS 2 STAGE LIGHTHOUSE TIP 32/40FR X 1/2": Type: IMPLANTABLE DEVICE | Status: FUNCTIONAL

## 2023-07-17 DEVICE — KIT A-LINE 1LUM 20G X 12CM SAFE KIT: Type: IMPLANTABLE DEVICE | Status: FUNCTIONAL

## 2023-07-17 DEVICE — LIGATING CLIPS WECK HORIZON LARGE (ORANGE) 6: Type: IMPLANTABLE DEVICE | Status: FUNCTIONAL

## 2023-07-17 DEVICE — IMPLANTABLE DEVICE: Type: IMPLANTABLE DEVICE | Status: FUNCTIONAL

## 2023-07-17 DEVICE — KIT CVC 2LUM MAC 9FR CHG: Type: IMPLANTABLE DEVICE | Status: FUNCTIONAL

## 2023-07-17 DEVICE — CANNULA AORTIC ROOT WITH VENT LINE 9G X 13.3CM FLANGED PRESSURE MONITORING: Type: IMPLANTABLE DEVICE | Status: FUNCTIONAL

## 2023-07-17 DEVICE — SHUNT FLO-THRU INTRALUMINAL 1MM X 18MM: Type: IMPLANTABLE DEVICE | Status: FUNCTIONAL

## 2023-07-17 DEVICE — CATH VENT VENTRICULAR PVC 18FR X 4.25" TIP PERFORATION: Type: IMPLANTABLE DEVICE | Status: FUNCTIONAL

## 2023-07-17 DEVICE — CHEST DRAIN THORACIC ARGYLE PVC 32FR RIGHT ANGLE: Type: IMPLANTABLE DEVICE | Status: FUNCTIONAL

## 2023-07-17 RX ORDER — ALBUMIN HUMAN 25 %
250 VIAL (ML) INTRAVENOUS ONCE
Refills: 0 | Status: COMPLETED | OUTPATIENT
Start: 2023-07-17 | End: 2023-07-17

## 2023-07-17 RX ORDER — DEXTROSE 50 % IN WATER 50 %
25 SYRINGE (ML) INTRAVENOUS
Refills: 0 | Status: DISCONTINUED | OUTPATIENT
Start: 2023-07-17 | End: 2023-07-23

## 2023-07-17 RX ORDER — CEFUROXIME AXETIL 250 MG
1500 TABLET ORAL EVERY 8 HOURS
Refills: 0 | Status: COMPLETED | OUTPATIENT
Start: 2023-07-17 | End: 2023-07-19

## 2023-07-17 RX ORDER — GABAPENTIN 400 MG/1
100 CAPSULE ORAL EVERY 8 HOURS
Refills: 0 | Status: COMPLETED | OUTPATIENT
Start: 2023-07-17 | End: 2023-07-22

## 2023-07-17 RX ORDER — ALBUMIN HUMAN 25 %
250 VIAL (ML) INTRAVENOUS
Refills: 0 | Status: COMPLETED | OUTPATIENT
Start: 2023-07-17 | End: 2023-07-17

## 2023-07-17 RX ORDER — OXYCODONE HYDROCHLORIDE 5 MG/1
10 TABLET ORAL EVERY 4 HOURS
Refills: 0 | Status: DISCONTINUED | OUTPATIENT
Start: 2023-07-17 | End: 2023-07-23

## 2023-07-17 RX ORDER — ASCORBIC ACID 60 MG
500 TABLET,CHEWABLE ORAL
Refills: 0 | Status: COMPLETED | OUTPATIENT
Start: 2023-07-17 | End: 2023-07-22

## 2023-07-17 RX ORDER — SODIUM CHLORIDE 9 MG/ML
1000 INJECTION INTRAMUSCULAR; INTRAVENOUS; SUBCUTANEOUS
Refills: 0 | Status: DISCONTINUED | OUTPATIENT
Start: 2023-07-17 | End: 2023-07-23

## 2023-07-17 RX ORDER — POTASSIUM CHLORIDE 20 MEQ
10 PACKET (EA) ORAL
Refills: 0 | Status: DISCONTINUED | OUTPATIENT
Start: 2023-07-17 | End: 2023-07-19

## 2023-07-17 RX ORDER — ASPIRIN/CALCIUM CARB/MAGNESIUM 324 MG
81 TABLET ORAL DAILY
Refills: 0 | Status: DISCONTINUED | OUTPATIENT
Start: 2023-07-17 | End: 2023-07-23

## 2023-07-17 RX ORDER — HYDROMORPHONE HYDROCHLORIDE 2 MG/ML
0.5 INJECTION INTRAMUSCULAR; INTRAVENOUS; SUBCUTANEOUS EVERY 6 HOURS
Refills: 0 | Status: DISCONTINUED | OUTPATIENT
Start: 2023-07-17 | End: 2023-07-19

## 2023-07-17 RX ORDER — CHLORHEXIDINE GLUCONATE 213 G/1000ML
1 SOLUTION TOPICAL DAILY
Refills: 0 | Status: DISCONTINUED | OUTPATIENT
Start: 2023-07-17 | End: 2023-07-23

## 2023-07-17 RX ORDER — ATORVASTATIN CALCIUM 80 MG/1
40 TABLET, FILM COATED ORAL AT BEDTIME
Refills: 0 | Status: DISCONTINUED | OUTPATIENT
Start: 2023-07-17 | End: 2023-07-23

## 2023-07-17 RX ORDER — ACETAMINOPHEN 500 MG
650 TABLET ORAL EVERY 6 HOURS
Refills: 0 | Status: COMPLETED | OUTPATIENT
Start: 2023-07-20 | End: 2024-06-17

## 2023-07-17 RX ORDER — DEXTROSE 50 % IN WATER 50 %
50 SYRINGE (ML) INTRAVENOUS
Refills: 0 | Status: DISCONTINUED | OUTPATIENT
Start: 2023-07-17 | End: 2023-07-23

## 2023-07-17 RX ORDER — ASPIRIN/CALCIUM CARB/MAGNESIUM 324 MG
300 TABLET ORAL ONCE
Refills: 0 | Status: DISCONTINUED | OUTPATIENT
Start: 2023-07-17 | End: 2023-07-17

## 2023-07-17 RX ORDER — OXYCODONE HYDROCHLORIDE 5 MG/1
5 TABLET ORAL EVERY 4 HOURS
Refills: 0 | Status: DISCONTINUED | OUTPATIENT
Start: 2023-07-17 | End: 2023-07-23

## 2023-07-17 RX ORDER — POLYETHYLENE GLYCOL 3350 17 G/17G
17 POWDER, FOR SOLUTION ORAL DAILY
Refills: 0 | Status: DISCONTINUED | OUTPATIENT
Start: 2023-07-18 | End: 2023-07-23

## 2023-07-17 RX ORDER — SODIUM NITROPRUSSIDE 50 MG/2ML
0.5 INJECTION INTRAVENOUS
Qty: 100 | Refills: 0 | Status: DISCONTINUED | OUTPATIENT
Start: 2023-07-17 | End: 2023-07-18

## 2023-07-17 RX ORDER — POTASSIUM CHLORIDE 20 MEQ
10 PACKET (EA) ORAL
Refills: 0 | Status: COMPLETED | OUTPATIENT
Start: 2023-07-17 | End: 2023-07-17

## 2023-07-17 RX ORDER — NICARDIPINE HYDROCHLORIDE 30 MG/1
5 CAPSULE, EXTENDED RELEASE ORAL
Qty: 40 | Refills: 0 | Status: DISCONTINUED | OUTPATIENT
Start: 2023-07-17 | End: 2023-07-18

## 2023-07-17 RX ORDER — AMIODARONE HYDROCHLORIDE 400 MG/1
400 TABLET ORAL
Refills: 0 | Status: COMPLETED | OUTPATIENT
Start: 2023-07-17 | End: 2023-07-20

## 2023-07-17 RX ORDER — ACETAMINOPHEN 500 MG
650 TABLET ORAL EVERY 6 HOURS
Refills: 0 | Status: COMPLETED | OUTPATIENT
Start: 2023-07-17 | End: 2023-07-20

## 2023-07-17 RX ORDER — CHLORHEXIDINE GLUCONATE 213 G/1000ML
15 SOLUTION TOPICAL EVERY 12 HOURS
Refills: 0 | Status: DISCONTINUED | OUTPATIENT
Start: 2023-07-17 | End: 2023-07-17

## 2023-07-17 RX ORDER — PANTOPRAZOLE SODIUM 20 MG/1
40 TABLET, DELAYED RELEASE ORAL DAILY
Refills: 0 | Status: DISCONTINUED | OUTPATIENT
Start: 2023-07-17 | End: 2023-07-18

## 2023-07-17 RX ORDER — SENNA PLUS 8.6 MG/1
2 TABLET ORAL AT BEDTIME
Refills: 0 | Status: DISCONTINUED | OUTPATIENT
Start: 2023-07-18 | End: 2023-07-23

## 2023-07-17 RX ORDER — INSULIN HUMAN 100 [IU]/ML
3 INJECTION, SOLUTION SUBCUTANEOUS
Qty: 100 | Refills: 0 | Status: DISCONTINUED | OUTPATIENT
Start: 2023-07-17 | End: 2023-07-23

## 2023-07-17 RX ADMIN — Medication 125 MILLILITER(S): at 18:09

## 2023-07-17 RX ADMIN — GABAPENTIN 300 MILLIGRAM(S): 400 CAPSULE ORAL at 05:25

## 2023-07-17 RX ADMIN — Medication 500 MILLILITER(S): at 16:53

## 2023-07-17 RX ADMIN — SODIUM CHLORIDE 10 MILLILITER(S): 9 INJECTION INTRAMUSCULAR; INTRAVENOUS; SUBCUTANEOUS at 20:00

## 2023-07-17 RX ADMIN — HYDROMORPHONE HYDROCHLORIDE 0.5 MILLIGRAM(S): 2 INJECTION INTRAMUSCULAR; INTRAVENOUS; SUBCUTANEOUS at 18:15

## 2023-07-17 RX ADMIN — Medication 81 MILLIGRAM(S): at 21:44

## 2023-07-17 RX ADMIN — Medication 1000 MILLIGRAM(S): at 05:26

## 2023-07-17 RX ADMIN — Medication 100 MILLIEQUIVALENT(S): at 19:30

## 2023-07-17 RX ADMIN — Medication 500 MILLILITER(S): at 15:52

## 2023-07-17 RX ADMIN — SODIUM CHLORIDE 3 MILLILITER(S): 9 INJECTION INTRAMUSCULAR; INTRAVENOUS; SUBCUTANEOUS at 05:24

## 2023-07-17 RX ADMIN — INSULIN HUMAN 3 UNIT(S)/HR: 100 INJECTION, SOLUTION SUBCUTANEOUS at 18:09

## 2023-07-17 RX ADMIN — AMLODIPINE BESYLATE 10 MILLIGRAM(S): 2.5 TABLET ORAL at 05:25

## 2023-07-17 RX ADMIN — CHLORHEXIDINE GLUCONATE 15 MILLILITER(S): 213 SOLUTION TOPICAL at 05:27

## 2023-07-17 RX ADMIN — INSULIN HUMAN 3 UNIT(S)/HR: 100 INJECTION, SOLUTION SUBCUTANEOUS at 20:00

## 2023-07-17 RX ADMIN — Medication 100 MILLIGRAM(S): at 20:00

## 2023-07-17 RX ADMIN — Medication 1000 MILLIGRAM(S): at 05:56

## 2023-07-17 RX ADMIN — HYDROMORPHONE HYDROCHLORIDE 0.5 MILLIGRAM(S): 2 INJECTION INTRAMUSCULAR; INTRAVENOUS; SUBCUTANEOUS at 18:00

## 2023-07-17 RX ADMIN — Medication 12.5 MILLIGRAM(S): at 05:26

## 2023-07-17 NOTE — PROGRESS NOTE ADULT - SUBJECTIVE AND OBJECTIVE BOX
DELICIA MIRANDA  MRN-[id number]  Patient is a 63y old  Male who presents with a chief complaint of CAD (17 Jul 2023 12:22)    HPI:  Patient is 63 year old male with PMHx HTN, T2DM on metformin who presents to [facility] ED after +stress test 7/11 at 2pm. Patient afterwards ~@1530 began complaining of severe chest pressure with radiation to L arm/shoulder and generalized weakness. Pt states he had similar chest pain ~1 month ago but notes chest pain at that time resolved. Patient admits to extensive family history of CAD. Patient denies SOB, palpitations, history of CAD, HF, or recent illness. Patient follows with cardiologist Dr. Posada.  Patient received Brilinta 180 mg x1 in Dr. Posada's office,   ED course- cardiology was called initially for STEMI alert. Upon review of EKG, pt does not meet STEMI criteria- , A fib RVR, LEON in aVR with ST depressions leads II, III, aVF, V3-V6. Patient with continued chest pressure. Pt s/p load with ASA/Heparin as per ACS protocol, 2.5 mg IVP Lopressor x1 and 25 mg PO Lopressor for rate control. Troponin 48.  7/12 patient had Cardiac Cath which revealed 3VCAD. Now for Tx to [facility] Evaluation.     (13 Jul 2023 22:24)      Surgery/Hospital course:  date/OR      brought to icu from OR , intubated. full vent support.  sedated on precedex  drips insuline and norepinephrine   BP labile, fluid resuscitated , titrate pressors         REVIEW OF SYSTEMS:  Gen: No fever  EYES/ENT: No visual changes;  No vertigo or throat pain   NECK: No pain   RES:  No shortness of breath or Cough  Chest: + incisional pain  CARD: No chest pain   GI: No abdominal pain  : No dysuria  NEURO: No weakness  SKIN: No itching, rashes     Physical Exam:  Vital Signs Last 24 Hrs  T(C): 37.7 (17 Jul 2023 20:00), Max: 37.7 (17 Jul 2023 20:00)  T(F): 99.9 (17 Jul 2023 20:00), Max: 99.9 (17 Jul 2023 20:00)  HR: 79 (17 Jul 2023 22:00) (61 - 85)  BP: 107/59 (17 Jul 2023 19:00) (106/63 - 140/71)  BP(mean): 77 (17 Jul 2023 19:00) (77 - 101)  RR: 15 (17 Jul 2023 22:00) (12 - 23)  SpO2: 99% (17 Jul 2023 22:00) (90% - 100%)    Parameters below as of 17 Jul 2023 22:00  Patient On (Oxygen Delivery Method): nasal cannula  O2 Flow (L/min): 5    Gen:  intubated, full vent support  CNS: sedated post anesthesia  Neck: no JVD  RES : clear , no wheezing    Chest:   + chest tubes                     CVS: Regular  rhythm. Normal S1/S2  Abd: Soft, non-distended. Bowel sounds present.  Skin: No rash.  Ext:  no edema  ============================I/O===========================   I&O's Detail    16 Jul 2023 07:01  -  17 Jul 2023 07:00  --------------------------------------------------------  IN:    Heparin: 264 mL    Oral Fluid: 50 mL  Total IN: 314 mL    OUT:  Total OUT: 0 mL    Total NET: 314 mL      17 Jul 2023 07:01  -  17 Jul 2023 22:41  --------------------------------------------------------  IN:    Albumin 5%  - 250 mL: 750 mL    Insulin: 18 mL    IV PiggyBack: 100 mL    NiCARdipine: 50 mL    Oral Fluid: 240 mL    sodium chloride 0.9%: 80 mL  Total IN: 1238 mL    OUT:    Chest Tube (mL): 30 mL    Chest Tube (mL): 110 mL    Chest Tube (mL): 85 mL    Indwelling Catheter - Urethral (mL): 1010 mL    Nitroprusside: 0 mL  Total OUT: 1235 mL    Total NET: 3 mL        ============================ LABS =========================                        13.4   29.02 )-----------( 186      ( 17 Jul 2023 15:32 )             40.4     07-17    140  |  102  |  10  ----------------------------<  190<H>  4.4   |  19<L>  |  0.92    Ca    11.1<H>      17 Jul 2023 15:33  Phos  4.8     07-17  Mg     2.3     07-17    TPro  6.6  /  Alb  4.2  /  TBili  1.0  /  DBili  x   /  AST  55<H>  /  ALT  44  /  AlkPhos  66  07-17    LIVER FUNCTIONS - ( 17 Jul 2023 15:33 )  Alb: 4.2 g/dL / Pro: 6.6 g/dL / ALK PHOS: 66 U/L / ALT: 44 U/L / AST: 55 U/L / GGT: x           PT/INR - ( 17 Jul 2023 15:32 )   PT: 14.1 sec;   INR: 1.21 ratio         PTT - ( 17 Jul 2023 15:32 )  PTT:27.6 sec  ABG - ( 17 Jul 2023 20:58 )  pH, Arterial: 7.42  pH, Blood: x     /  pCO2: 40    /  pO2: 88    / HCO3: 26    / Base Excess: 1.3   /  SaO2: 98.3              Urinalysis Basic - ( 17 Jul 2023 15:33 )    Color: x / Appearance: x / SG: x / pH: x  Gluc: 190 mg/dL / Ketone: x  / Bili: x / Urobili: x   Blood: x / Protein: x / Nitrite: x   Leuk Esterase: x / RBC: x / WBC x   Sq Epi: x / Non Sq Epi: x / Bacteria: x      ======================Micro/Rad/Cardio=================  Culture: Reviewed   CXR: Reviewed  Echo:Reviewed  ======================================================  PAST MEDICAL & SURGICAL HISTORY:  HTN (hypertension)      Diabetes mellitus      GERD (gastroesophageal reflux disease)      Hyperlipidemia      No significant past surgical history        ====================ASSESMENT ==============  HPI:  Patient is 63 year old male with PMHx HTN, T2DM on metformin who presents to [facility] ED after +stress test 7/11 at 2pm. Patient afterwards ~@1530 began complaining of severe chest pressure with radiation to L arm/shoulder and generalized weakness. Pt states he had similar chest pain ~1 month ago but notes chest pain at that time resolved. Patient admits to extensive family history of CAD. Patient denies SOB, palpitations, history of CAD, HF, or recent illness. Patient follows with cardiologist Dr. Posada.  Patient received Brilinta 180 mg x1 in Dr. Posada's office,   ED course- cardiology was called initially for STEMI alert. Upon review of EKG, pt does not meet STEMI criteria- , A fib RVR, LEON in aVR with ST depressions leads II, III, aVF, V3-V6. Patient with continued chest pressure. Pt s/p load with ASA/Heparin as per ACS protocol, 2.5 mg IVP Lopressor x1 and 25 mg PO Lopressor for rate control. Troponin 48.  7/12 patient had Cardiac Cath which revealed 3VCAD. Now for Tx to [facility].     (13 Jul 2023 22:24)    OR  acute on chronic systolic CHF  hemodynamic instability  Anemia blood loss  Hyperglycemia  CAD/ASHD  Diabetes mellitus type 2  Hypothyroidism   Acute Respiratory insuficiency post op    encounter weaning from and management of  respirator  encounter management of temporary Pacing  and wires interogation, VVI   encounter IABP management  encounter ECMO management    Plan:  ====================== NEUROLOGY=====================  [x] Hx of CVA   [x] Nonfocal  [x] Sedated with [x] Diprivan [x] Precedex   Post operative neuro assessment     acetaminophen     Tablet .. 650 milliGRAM(s) Oral every 6 hours  gabapentin 100 milliGRAM(s) Oral every 8 hours  HYDROmorphone  Injectable 0.5 milliGRAM(s) IV Push every 6 hours PRN Breakthrough Pain  oxyCODONE    IR 10 milliGRAM(s) Oral every 4 hours PRN Severe Pain (7 - 10)  oxyCODONE    IR 5 milliGRAM(s) Oral every 4 hours PRN Moderate Pain (4 - 6)    ==================== RESPIRATORY======================  *Make sure to add settings! - Remove if not applicable* [x] NC [x] BiPAP [x] HFO2 [x]  Nitric oxide   *if has vent settings below* Post op vent management   Titration of FiO2 and PEEP, follow SpO2, CXR, blood gasses     Mechanical Ventilation:      ====================CARDIOVASCULAR==================  Invasive hemodynamic monitoring, assess perfusion indices   SR / CVP ___ / MAP ___ / PAP ___ / CI ___ / Hct ___ / Lactate ___   [x] Levophed [x] Vasopressin [x] Primacor [x] Epinephrine [x] Dobutamine [x] Cyrus  [x]  IABP [x]  LVAD [x]  Impella [x] ECMO *make sure to document settings*   Volume:    Reassessment of hemodynamics post resuscitation *or .rh if no fluids above*  *insert other cardiac meds/indications here*   Monitor chest tube outputs *remove if none present, check DAILY*  [x] AC therapy with / [x] VTE ppx with   [x]  ASA [x]  Plavix [x] Statin   Serial EKG and cardiac enzymes     aMIOdarone    Tablet 400 milliGRAM(s) Oral two times a day  niCARdipine Infusion 5 mG/Hr (25 mL/Hr) IV Continuous <Continuous>  nitroprusside Infusion 0.5 MICROgram(s)/kG/Min (6.16 mL/Hr) IV Continuous <Continuous>    ===================HEMATOLOGIC/ONC ===================  aspirin enteric coated 81 milliGRAM(s) Oral daily    monitor plts and Hb/Hct  ===================== RENAL =========================  [x] LUCILA [x]  CKD [x] ESRD on HD   Continue to monitor I/Os, BUN/Creatinine.   Replete lytes PRN  Marie present *remove if none*  *insert renal meds here*      ==================== GASTROINTESTINAL===================  [x] NPO / [x]  Diet:    [x] Protonix  [x]  Pepcid    ascorbic acid 500 milliGRAM(s) Oral two times a day  pantoprazole  Injectable 40 milliGRAM(s) IV Push daily  potassium chloride  10 mEq/50 mL IVPB 10 milliEquivalent(s) IV Intermittent every 1 hour  potassium chloride  10 mEq/50 mL IVPB 10 milliEquivalent(s) IV Intermittent every 1 hour  potassium chloride  10 mEq/50 mL IVPB 10 milliEquivalent(s) IV Intermittent every 1 hour  potassium chloride  10 mEq/50 mL IVPB 10 milliEquivalent(s) IV Intermittent every 1 hour  sodium chloride 0.9%. 1000 milliLiter(s) (10 mL/Hr) IV Continuous <Continuous>    =======================    ENDOCRINE  =====================  [x] Stress Hyperglycemia [x]  DM2 [x] DM1 [x] Prediabetes : HbA1c ____%                - [x] Insulin gtt  [x]  ISS  [x] NPH  [x]  Lantus             - Need tight glycemic control to prevent wound infection.    [x] Hypothyroid   - c/w synthroid     atorvastatin 40 milliGRAM(s) Oral at bedtime  dextrose 50% Injectable 25 milliLiter(s) IV Push every 15 minutes  dextrose 50% Injectable 50 milliLiter(s) IV Push every 15 minutes  insulin regular Infusion 3 Unit(s)/Hr (3 mL/Hr) IV Continuous <Continuous>    ========================INFECTIOUS DISEASE================  *summarize abx/indication*  *If no abxs* No active antibiotic coverage      cefuroxime  IVPB 1500 milliGRAM(s) IV Intermittent every 8 hours        ALL MEDICATIONS (delete after use)  MEDICATIONS  (STANDING):  acetaminophen     Tablet .. 650 milliGRAM(s) Oral every 6 hours  aMIOdarone    Tablet 400 milliGRAM(s) Oral two times a day  ascorbic acid 500 milliGRAM(s) Oral two times a day  aspirin enteric coated 81 milliGRAM(s) Oral daily  atorvastatin 40 milliGRAM(s) Oral at bedtime  cefuroxime  IVPB 1500 milliGRAM(s) IV Intermittent every 8 hours  chlorhexidine 2% Cloths 1 Application(s) Topical daily  dextrose 50% Injectable 25 milliLiter(s) IV Push every 15 minutes  dextrose 50% Injectable 50 milliLiter(s) IV Push every 15 minutes  gabapentin 100 milliGRAM(s) Oral every 8 hours  insulin regular Infusion 3 Unit(s)/Hr (3 mL/Hr) IV Continuous <Continuous>  niCARdipine Infusion 5 mG/Hr (25 mL/Hr) IV Continuous <Continuous>  nitroprusside Infusion 0.5 MICROgram(s)/kG/Min (6.16 mL/Hr) IV Continuous <Continuous>  pantoprazole  Injectable 40 milliGRAM(s) IV Push daily  potassium chloride  10 mEq/50 mL IVPB 10 milliEquivalent(s) IV Intermittent every 1 hour  potassium chloride  10 mEq/50 mL IVPB 10 milliEquivalent(s) IV Intermittent every 1 hour  potassium chloride  10 mEq/50 mL IVPB 10 milliEquivalent(s) IV Intermittent every 1 hour  potassium chloride  10 mEq/50 mL IVPB 10 milliEquivalent(s) IV Intermittent every 1 hour  sodium chloride 0.9%. 1000 milliLiter(s) (10 mL/Hr) IV Continuous <Continuous>    MEDICATIONS  (PRN):  HYDROmorphone  Injectable 0.5 milliGRAM(s) IV Push every 6 hours PRN Breakthrough Pain  oxyCODONE    IR 5 milliGRAM(s) Oral every 4 hours PRN Moderate Pain (4 - 6)  oxyCODONE    IR 10 milliGRAM(s) Oral every 4 hours PRN Severe Pain (7 - 10)        .crit

## 2023-07-17 NOTE — PROGRESS NOTE ADULT - ASSESSMENT
Assessment  DMT2: 63y Male with DM T2 with hyperglycemia, A1C 8.2%, was on oral meds at home, now on coverage sliding scale,  awaiting CABG. Glucose trending stable. FOr OR today.   Will need tight glycemic control for postop wound healing.   CAD: on medications, stable, monitored.   HTN: on antihypertensive medications, monitored, asymptomatic.  HLD: on high intensity statin.     Discussed plan and management wit Dr Lonnie Renee NP-TEAMS  Mellissa Fleming MD  Cell: [phone]  Office: [phone]               Assessment  DMT2: 63y Male with DM T2 with hyperglycemia, A1C 8.2%, was on oral meds at home, now on coverage sliding scale,  awaiting CABG. Glucose  trending stable. FOr OR today.   Will need tight glycemic control for postop wound healing.   CAD: on medications, stable, monitored.   HTN: on antihypertensive medications, monitored, asymptomatic.  HLD: on high intensity statin.     Discussed plan and management wit Dr Lonnie Renee NP-TEAMS  Mellissa Fleming MD  Cell: [phone]  Office: [phone]

## 2023-07-17 NOTE — BRIEF OPERATIVE NOTE - OPERATION/FINDINGS
Aortic XClamp:  117   |    Total CPB:   149  Procedure: C3-L  LIMA-LAD | SVG-OM | SVG-PDA    Left greater saphenous vein harvested endoscopically by VI MARTÍNEZ

## 2023-07-17 NOTE — PROGRESS NOTE ADULT - SUBJECTIVE AND OBJECTIVE BOX
OR today    Chief complaint  Patient is a 63y old  Male who presents with a chief complaint of CAD (16 Jul 2023 12:41)         Labs and Fingersticks  CAPILLARY BLOOD GLUCOSE      POCT Blood Glucose.: 127 mg/dL (16 Jul 2023 21:39)  POCT Blood Glucose.: 116 mg/dL (16 Jul 2023 17:20)      Anion Gap: 14 (07-16 @ 06:56)      Calcium: 9.5 (07-16 @ 06:56)          07-16    140  |  106  |  10  ----------------------------<  110<H>  3.8   |  20<L>  |  0.99    Ca    9.5      16 Jul 2023 06:56                          13.6   4.75  )-----------( 173      ( 16 Jul 2023 06:56 )             41.4     Medications  MEDICATIONS  (STANDING):  cefuroxime  IVPB 1500 milliGRAM(s) IV Intermittent once      Physical Exam  General: Patient comfortable in bed   Vital Signs Last 12 Hrs  T(F): 97.7 (07-17-23 @ 04:40), Max: 97.7 (07-17-23 @ 04:40)  HR: 61 (07-17-23 @ 07:00) (61 - 61)  BP: 106/63 (07-17-23 @ 07:00) (106/63 - 106/63)  BP(mean): 101 (07-17-23 @ 07:00) (101 - 101)  RR: 18 (07-17-23 @ 07:00) (18 - 18)  SpO2: 98% (07-17-23 @ 07:00) (98% - 98%)    CVS: S1S2   Respiratory: No wheezing, no crepitations  GI: Abdomen soft, bowel sounds positive  Musculoskeletal:  moves all extremities  : Voiding      OR today      Chief complaint  Patient is a 63y old  Male who presents with a chief complaint of CAD (16 Jul 2023 12:41)         Labs and Fingersticks  CAPILLARY BLOOD GLUCOSE      POCT Blood Glucose.: 127 mg/dL (16 Jul 2023 21:39)  POCT Blood Glucose.: 116 mg/dL (16 Jul 2023 17:20)      Anion Gap: 14 (07-16 @ 06:56)      Calcium: 9.5 (07-16 @ 06:56)          07-16    140  |  106  |  10  ----------------------------<  110<H>  3.8   |  20<L>  |  0.99    Ca    9.5      16 Jul 2023 06:56                          13.6   4.75  )-----------( 173      ( 16 Jul 2023 06:56 )             41.4     Medications  MEDICATIONS  (STANDING):  cefuroxime  IVPB 1500 milliGRAM(s) IV Intermittent once      Physical Exam  General: Patient comfortable in bed   Vital Signs Last 12 Hrs  T(F): 97.7 (07-17-23 @ 04:40), Max: 97.7 (07-17-23 @ 04:40)  HR: 61 (07-17-23 @ 07:00) (61 - 61)  BP: 106/63 (07-17-23 @ 07:00) (106/63 - 106/63)  BP(mean): 101 (07-17-23 @ 07:00) (101 - 101)  RR: 18 (07-17-23 @ 07:00) (18 - 18)  SpO2: 98% (07-17-23 @ 07:00) (98% - 98%)    CVS: S1S2   Respiratory: No wheezing, no crepitations  GI: Abdomen soft, bowel sounds positive  Musculoskeletal:  moves all extremities  : Voiding

## 2023-07-17 NOTE — PROGRESS NOTE ADULT - PROBLEM SELECTOR PLAN 1
Will continue current insulin regimen for now.   Postop IV insulin for glycemic control   Will continue monitoring FS, log, and glucose trends, will Follow up.  Patient counseled for compliance with consistent low carb diet and exercise as tolerated outpatient.

## 2023-07-17 NOTE — PRE-ANESTHESIA EVALUATION ADULT - NSANTHOSAYNRD_GEN_A_CORE
No. [facility] screening performed.  STOP BANG Legend: 0-2 = LOW Risk; 3-4 = INTERMEDIATE Risk; 5-8 = HIGH Risk

## 2023-07-18 LAB
ALBUMIN SERPL ELPH-MCNC: 4.3 G/DL — SIGNIFICANT CHANGE UP (ref 3.3–5)
ALP SERPL-CCNC: 41 U/L — SIGNIFICANT CHANGE UP (ref 40–120)
ALT FLD-CCNC: 32 U/L — SIGNIFICANT CHANGE UP (ref 10–45)
ANION GAP SERPL CALC-SCNC: 15 MMOL/L — SIGNIFICANT CHANGE UP (ref 5–17)
APTT BLD: 37.2 SEC — HIGH (ref 27.5–35.5)
AST SERPL-CCNC: 42 U/L — HIGH (ref 10–40)
BASE EXCESS BLDV CALC-SCNC: -2.9 MMOL/L — LOW (ref -2–3)
BILIRUB SERPL-MCNC: 0.8 MG/DL — SIGNIFICANT CHANGE UP (ref 0.2–1.2)
BUN SERPL-MCNC: 12 MG/DL — SIGNIFICANT CHANGE UP (ref 7–23)
CALCIUM SERPL-MCNC: 9.1 MG/DL — SIGNIFICANT CHANGE UP (ref 8.4–10.5)
CHLORIDE SERPL-SCNC: 102 MMOL/L — SIGNIFICANT CHANGE UP (ref 96–108)
CO2 BLDV-SCNC: 25 MMOL/L — SIGNIFICANT CHANGE UP (ref 22–26)
CO2 SERPL-SCNC: 20 MMOL/L — LOW (ref 22–31)
CREAT SERPL-MCNC: 0.96 MG/DL — SIGNIFICANT CHANGE UP (ref 0.5–1.3)
EGFR: 89 ML/MIN/1.73M2 — SIGNIFICANT CHANGE UP
GAS PNL BLDA: SIGNIFICANT CHANGE UP
GLUCOSE BLDC GLUCOMTR-MCNC: 115 MG/DL — HIGH (ref 70–99)
GLUCOSE BLDC GLUCOMTR-MCNC: 117 MG/DL — HIGH (ref 70–99)
GLUCOSE BLDC GLUCOMTR-MCNC: 119 MG/DL — HIGH (ref 70–99)
GLUCOSE BLDC GLUCOMTR-MCNC: 121 MG/DL — HIGH (ref 70–99)
GLUCOSE BLDC GLUCOMTR-MCNC: 122 MG/DL — HIGH (ref 70–99)
GLUCOSE BLDC GLUCOMTR-MCNC: 126 MG/DL — HIGH (ref 70–99)
GLUCOSE BLDC GLUCOMTR-MCNC: 128 MG/DL — HIGH (ref 70–99)
GLUCOSE BLDC GLUCOMTR-MCNC: 141 MG/DL — HIGH (ref 70–99)
GLUCOSE BLDC GLUCOMTR-MCNC: 142 MG/DL — HIGH (ref 70–99)
GLUCOSE BLDC GLUCOMTR-MCNC: 154 MG/DL — HIGH (ref 70–99)
GLUCOSE BLDC GLUCOMTR-MCNC: 157 MG/DL — HIGH (ref 70–99)
GLUCOSE BLDC GLUCOMTR-MCNC: 157 MG/DL — HIGH (ref 70–99)
GLUCOSE BLDC GLUCOMTR-MCNC: 165 MG/DL — HIGH (ref 70–99)
GLUCOSE BLDC GLUCOMTR-MCNC: 177 MG/DL — HIGH (ref 70–99)
GLUCOSE BLDC GLUCOMTR-MCNC: 179 MG/DL — HIGH (ref 70–99)
GLUCOSE BLDC GLUCOMTR-MCNC: 191 MG/DL — HIGH (ref 70–99)
GLUCOSE BLDC GLUCOMTR-MCNC: 200 MG/DL — HIGH (ref 70–99)
GLUCOSE BLDC GLUCOMTR-MCNC: 220 MG/DL — HIGH (ref 70–99)
GLUCOSE BLDC GLUCOMTR-MCNC: 229 MG/DL — HIGH (ref 70–99)
GLUCOSE BLDC GLUCOMTR-MCNC: 246 MG/DL — HIGH (ref 70–99)
GLUCOSE BLDC GLUCOMTR-MCNC: 95 MG/DL — SIGNIFICANT CHANGE UP (ref 70–99)
GLUCOSE SERPL-MCNC: 156 MG/DL — HIGH (ref 70–99)
HCO3 BLDV-SCNC: 24 MMOL/L — SIGNIFICANT CHANGE UP (ref 22–29)
HCT VFR BLD CALC: 30.7 % — LOW (ref 39–50)
HGB BLD-MCNC: 10.2 G/DL — LOW (ref 13–17)
HOROWITZ INDEX BLDV+IHG-RTO: 40 — SIGNIFICANT CHANGE UP
INR BLD: 1.28 RATIO — HIGH (ref 0.88–1.16)
MAGNESIUM SERPL-MCNC: 1.9 MG/DL — SIGNIFICANT CHANGE UP (ref 1.6–2.6)
MCHC RBC-ENTMCNC: 28.8 PG — SIGNIFICANT CHANGE UP (ref 27–34)
MCHC RBC-ENTMCNC: 33.2 GM/DL — SIGNIFICANT CHANGE UP (ref 32–36)
MCV RBC AUTO: 86.7 FL — SIGNIFICANT CHANGE UP (ref 80–100)
NRBC # BLD: 0 /100 WBCS — SIGNIFICANT CHANGE UP (ref 0–0)
PCO2 BLDV: 47 MMHG — SIGNIFICANT CHANGE UP (ref 42–55)
PH BLDV: 7.31 — LOW (ref 7.32–7.43)
PHOSPHATE SERPL-MCNC: 3.1 MG/DL — SIGNIFICANT CHANGE UP (ref 2.5–4.5)
PLATELET # BLD AUTO: 149 K/UL — LOW (ref 150–400)
PO2 BLDV: 40 MMHG — SIGNIFICANT CHANGE UP (ref 25–45)
POTASSIUM SERPL-MCNC: 4.9 MMOL/L — SIGNIFICANT CHANGE UP (ref 3.5–5.3)
POTASSIUM SERPL-SCNC: 4.9 MMOL/L — SIGNIFICANT CHANGE UP (ref 3.5–5.3)
PROT SERPL-MCNC: 6.3 G/DL — SIGNIFICANT CHANGE UP (ref 6–8.3)
PROTHROM AB SERPL-ACNC: 14.9 SEC — HIGH (ref 10.5–13.4)
RBC # BLD: 3.54 M/UL — LOW (ref 4.2–5.8)
RBC # FLD: 12.8 % — SIGNIFICANT CHANGE UP (ref 10.3–14.5)
SAO2 % BLDV: 67.5 % — SIGNIFICANT CHANGE UP (ref 67–88)
SODIUM SERPL-SCNC: 137 MMOL/L — SIGNIFICANT CHANGE UP (ref 135–145)
WBC # BLD: 11.45 K/UL — HIGH (ref 3.8–10.5)
WBC # FLD AUTO: 11.45 K/UL — HIGH (ref 3.8–10.5)

## 2023-07-18 PROCEDURE — 99291 CRITICAL CARE FIRST HOUR: CPT | Mod: 24

## 2023-07-18 PROCEDURE — 71045 X-RAY EXAM CHEST 1 VIEW: CPT | Mod: 26

## 2023-07-18 RX ORDER — METOCLOPRAMIDE HCL 10 MG
10 TABLET ORAL EVERY 8 HOURS
Refills: 0 | Status: DISCONTINUED | OUTPATIENT
Start: 2023-07-18 | End: 2023-07-23

## 2023-07-18 RX ORDER — POTASSIUM CHLORIDE 20 MEQ
40 PACKET (EA) ORAL ONCE
Refills: 0 | Status: COMPLETED | OUTPATIENT
Start: 2023-07-18 | End: 2023-07-18

## 2023-07-18 RX ORDER — METOPROLOL TARTRATE 50 MG
25 TABLET ORAL
Refills: 0 | Status: DISCONTINUED | OUTPATIENT
Start: 2023-07-18 | End: 2023-07-19

## 2023-07-18 RX ORDER — CLOPIDOGREL BISULFATE 75 MG/1
75 TABLET, FILM COATED ORAL DAILY
Refills: 0 | Status: DISCONTINUED | OUTPATIENT
Start: 2023-07-18 | End: 2023-07-23

## 2023-07-18 RX ORDER — FUROSEMIDE 40 MG
20 TABLET ORAL ONCE
Refills: 0 | Status: COMPLETED | OUTPATIENT
Start: 2023-07-18 | End: 2023-07-18

## 2023-07-18 RX ORDER — HEPARIN SODIUM 5000 [USP'U]/ML
5000 INJECTION INTRAVENOUS; SUBCUTANEOUS EVERY 8 HOURS
Refills: 0 | Status: DISCONTINUED | OUTPATIENT
Start: 2023-07-18 | End: 2023-07-23

## 2023-07-18 RX ORDER — PANTOPRAZOLE SODIUM 20 MG/1
40 TABLET, DELAYED RELEASE ORAL
Refills: 0 | Status: DISCONTINUED | OUTPATIENT
Start: 2023-07-18 | End: 2023-07-23

## 2023-07-18 RX ORDER — MAGNESIUM SULFATE 500 MG/ML
2 VIAL (ML) INJECTION ONCE
Refills: 0 | Status: COMPLETED | OUTPATIENT
Start: 2023-07-18 | End: 2023-07-18

## 2023-07-18 RX ADMIN — ATORVASTATIN CALCIUM 40 MILLIGRAM(S): 80 TABLET, FILM COATED ORAL at 22:19

## 2023-07-18 RX ADMIN — Medication 650 MILLIGRAM(S): at 11:02

## 2023-07-18 RX ADMIN — ATORVASTATIN CALCIUM 40 MILLIGRAM(S): 80 TABLET, FILM COATED ORAL at 00:14

## 2023-07-18 RX ADMIN — Medication 10 MILLIGRAM(S): at 22:19

## 2023-07-18 RX ADMIN — SENNA PLUS 2 TABLET(S): 8.6 TABLET ORAL at 22:20

## 2023-07-18 RX ADMIN — GABAPENTIN 100 MILLIGRAM(S): 400 CAPSULE ORAL at 07:00

## 2023-07-18 RX ADMIN — OXYCODONE HYDROCHLORIDE 10 MILLIGRAM(S): 5 TABLET ORAL at 22:50

## 2023-07-18 RX ADMIN — HYDROMORPHONE HYDROCHLORIDE 0.5 MILLIGRAM(S): 2 INJECTION INTRAMUSCULAR; INTRAVENOUS; SUBCUTANEOUS at 00:25

## 2023-07-18 RX ADMIN — INSULIN HUMAN 3 UNIT(S)/HR: 100 INJECTION, SOLUTION SUBCUTANEOUS at 11:02

## 2023-07-18 RX ADMIN — HEPARIN SODIUM 5000 UNIT(S): 5000 INJECTION INTRAVENOUS; SUBCUTANEOUS at 13:44

## 2023-07-18 RX ADMIN — CLOPIDOGREL BISULFATE 75 MILLIGRAM(S): 75 TABLET, FILM COATED ORAL at 11:03

## 2023-07-18 RX ADMIN — Medication 10 MILLIGRAM(S): at 13:44

## 2023-07-18 RX ADMIN — Medication 650 MILLIGRAM(S): at 11:32

## 2023-07-18 RX ADMIN — Medication 100 MILLIGRAM(S): at 20:19

## 2023-07-18 RX ADMIN — OXYCODONE HYDROCHLORIDE 10 MILLIGRAM(S): 5 TABLET ORAL at 22:20

## 2023-07-18 RX ADMIN — Medication 40 MILLIEQUIVALENT(S): at 13:43

## 2023-07-18 RX ADMIN — Medication 650 MILLIGRAM(S): at 17:22

## 2023-07-18 RX ADMIN — Medication 650 MILLIGRAM(S): at 05:05

## 2023-07-18 RX ADMIN — POLYETHYLENE GLYCOL 3350 17 GRAM(S): 17 POWDER, FOR SOLUTION ORAL at 11:03

## 2023-07-18 RX ADMIN — OXYCODONE HYDROCHLORIDE 10 MILLIGRAM(S): 5 TABLET ORAL at 15:36

## 2023-07-18 RX ADMIN — CHLORHEXIDINE GLUCONATE 1 APPLICATION(S): 213 SOLUTION TOPICAL at 11:04

## 2023-07-18 RX ADMIN — HYDROMORPHONE HYDROCHLORIDE 0.5 MILLIGRAM(S): 2 INJECTION INTRAMUSCULAR; INTRAVENOUS; SUBCUTANEOUS at 00:40

## 2023-07-18 RX ADMIN — GABAPENTIN 100 MILLIGRAM(S): 400 CAPSULE ORAL at 22:20

## 2023-07-18 RX ADMIN — Medication 500 MILLIGRAM(S): at 05:03

## 2023-07-18 RX ADMIN — Medication 100 MILLIGRAM(S): at 11:13

## 2023-07-18 RX ADMIN — OXYCODONE HYDROCHLORIDE 10 MILLIGRAM(S): 5 TABLET ORAL at 05:35

## 2023-07-18 RX ADMIN — Medication 81 MILLIGRAM(S): at 11:03

## 2023-07-18 RX ADMIN — Medication 25 GRAM(S): at 02:00

## 2023-07-18 RX ADMIN — Medication 650 MILLIGRAM(S): at 00:15

## 2023-07-18 RX ADMIN — HEPARIN SODIUM 5000 UNIT(S): 5000 INJECTION INTRAVENOUS; SUBCUTANEOUS at 22:19

## 2023-07-18 RX ADMIN — Medication 500 MILLIGRAM(S): at 17:22

## 2023-07-18 RX ADMIN — Medication 650 MILLIGRAM(S): at 05:35

## 2023-07-18 RX ADMIN — Medication 650 MILLIGRAM(S): at 00:40

## 2023-07-18 RX ADMIN — Medication 650 MILLIGRAM(S): at 17:52

## 2023-07-18 RX ADMIN — OXYCODONE HYDROCHLORIDE 10 MILLIGRAM(S): 5 TABLET ORAL at 16:30

## 2023-07-18 RX ADMIN — AMIODARONE HYDROCHLORIDE 400 MILLIGRAM(S): 400 TABLET ORAL at 05:03

## 2023-07-18 RX ADMIN — GABAPENTIN 100 MILLIGRAM(S): 400 CAPSULE ORAL at 00:15

## 2023-07-18 RX ADMIN — GABAPENTIN 100 MILLIGRAM(S): 400 CAPSULE ORAL at 13:44

## 2023-07-18 RX ADMIN — Medication 100 MILLIGRAM(S): at 04:48

## 2023-07-18 RX ADMIN — Medication 25 MILLIGRAM(S): at 13:43

## 2023-07-18 RX ADMIN — Medication 10 MILLIGRAM(S): at 05:03

## 2023-07-18 RX ADMIN — AMIODARONE HYDROCHLORIDE 400 MILLIGRAM(S): 400 TABLET ORAL at 17:22

## 2023-07-18 RX ADMIN — Medication 20 MILLIGRAM(S): at 20:19

## 2023-07-18 RX ADMIN — Medication 20 MILLIGRAM(S): at 11:13

## 2023-07-18 RX ADMIN — OXYCODONE HYDROCHLORIDE 10 MILLIGRAM(S): 5 TABLET ORAL at 05:05

## 2023-07-18 NOTE — PROGRESS NOTE ADULT - SUBJECTIVE AND OBJECTIVE BOX
Patient seen and examined at the bedside.    Remained critically ill on continuous ICU monitoring.    OBJECTIVE:  Vital Signs Last 24 Hrs  T(C): 37.5 (18 Jul 2023 04:00), Max: 37.8 (18 Jul 2023 00:00)  T(F): 99.5 (18 Jul 2023 04:00), Max: 100 (18 Jul 2023 00:00)  HR: 80 (18 Jul 2023 07:00) (75 - 88)  BP: 107/59 (17 Jul 2023 19:00) (107/59 - 140/71)  BP(mean): 77 (17 Jul 2023 19:00) (77 - 98)  RR: 18 (18 Jul 2023 07:00) (12 - 23)  SpO2: 96% (18 Jul 2023 07:00) (90% - 100%)    Parameters below as of 18 Jul 2023 06:00  Patient On (Oxygen Delivery Method): nasal cannula  O2 Flow (L/min): 4        Physical Exam:   General: NAD   Neurology: nonfocal   Eyes: bilateral pupils equal and reactive   ENT/Neck: Neck supple, trachea midline, No JVD   Respiratory: Clear bilaterally   CV: S1S2, no murmurs        [x] Sternal dressing, [x] Mediastinal CT x2, [x] Pleural CT        [x] Sinus rhythm, [x] Temporary pacing- box off  Abdominal: Soft, NT, ND +BS   Extremities: 1-2+ pedal edema noted, + peripheral pulses   Skin: No Rashes, Hematoma, Ecchymosis                           Assessment:  63 year old male with PMHx HTN, T2DM on metformin who presents to [facility] ED after +stress test 7/11     CAD s/p CABG 7/17/2023  Hemodynamic instability  Hypovolemia  Post op acute respiratory insufficiency  Acute blood loss anemia  Thrombocytopenia      Plan:   ***Neuro***   [x] Nonfocal    Post operative neuro assessment   Tylenol, Gabapentin, PRN Oxycodone, and PRN Dilaudid for pain management.       ***Cardiovascular***  Invasive hemodynamic monitoring, assess perfusion indices   SR / CVP 7/ MAP 70/ Hct 30.7%/ Lactate 1.0  Reassessment of hemodynamics post resuscitation    Monitor chest tube outputs    PO Amiodarone for afib prophylaxis   [x]  ASA  [x] Statin   Serial EKG and cardiac enzymes     ***Pulmonary***  [x] 4L NC  Encourage incentive spirometry, continue pulse ox monitoring, follow ABGs                 ***GI***  [x] Tolerating PO consistent carb diet.     [x] Protonix   Bowel regimen with Miralax and senna  Reglan for gut motility     ***Renal***  Continue to monitor I/Os, BUN/Creatinine.   Replete lytes PRN  Marie present      ***ID***  Cefuroxime for perioperative antibiotic coverage     ***Endocrine***  [x]  DM2: HbA1c 8.2%                - [x] Insulin gtt                - Need tight glycemic control to prevent wound infection.          Patient requires continuous monitoring with bedside rhythm monitoring, pulse oximetry monitoring, and continuous central venous and arterial pressure monitoring; and intermittent blood gas analysis. Care plan discussed with the ICU care team.   Patient remained critical, at risk for life threatening decompensation.    I have spent 30 minutes providing critical care management to this patient.    By signing my name below, I, Dl Rodríguez, attest that this documentation has been prepared under the direction and in the presence of MAURICIO Erickson   Electronically signed: Joseph Alvarez, 07-18-23 @ 07:07    I, MAURICIO Erickson, personally performed the services described in this documentation. all medical record entries made by the joseph were at my direction and in my presence. I have reviewed the chart and agree that the record reflects my personal performance and is accurate and complete  Electronically signed: MAURICIO Erickson  Patient seen and examined at the bedside.    Remained critically ill on continuous ICU monitoring.    No complaints, out of bed.    OBJECTIVE:  Vital Signs Last 24 Hrs  T(C): 37.5 (18 Jul 2023 04:00), Max: 37.8 (18 Jul 2023 00:00)  T(F): 99.5 (18 Jul 2023 04:00), Max: 100 (18 Jul 2023 00:00)  HR: 80 (18 Jul 2023 07:00) (75 - 88)  BP: 107/59 (17 Jul 2023 19:00) (107/59 - 140/71)  BP(mean): 77 (17 Jul 2023 19:00) (77 - 98)  RR: 18 (18 Jul 2023 07:00) (12 - 23)  SpO2: 96% (18 Jul 2023 07:00) (90% - 100%)    Parameters below as of 18 Jul 2023 06:00  Patient On (Oxygen Delivery Method): nasal cannula  O2 Flow (L/min): 4        Physical Exam:   General: NAD   Neurology: nonfocal   Eyes: bilateral pupils equal and reactive   ENT/Neck: Neck supple, trachea midline, No JVD   Respiratory: Clear bilaterally   CV: S1S2, no murmurs        [x] Sternal dressing, [x] Mediastinal CT x2, [x] Pleural CT        [x] Sinus rhythm, [x] Temporary pacing- box off  Abdominal: Soft, NT, ND +BS   Extremities: 1-2+ pedal edema noted, + peripheral pulses   Skin: No Rashes, Hematoma, Ecchymosis                           Assessment:  63 year old male with PMHx HTN, T2DM on metformin who presents to [facility] ED after +stress test 7/11     CAD s/p CABG 7/17/2023  Hemodynamic instability  Hypovolemia  Post op acute respiratory insufficiency  Acute blood loss anemia  Thrombocytopenia      Plan:   ***Neuro***   [x] Nonfocal    Post operative neuro assessment   Tylenol, Gabapentin, PRN Oxycodone, and PRN Dilaudid for pain management.       ***Cardiovascular***  Invasive hemodynamic monitoring, assess perfusion indices   Start beta blocker  SR / CVP 7/ MAP 70/ Hct 30.7%/ Lactate 1.0  Reassessment of hemodynamics post resuscitation    Monitor chest tube outputs    PO Amiodarone for afib prophylaxis   [x]  ASA  [x] Statin, plavix    ***Pulmonary***  [x] 4L NC  Encourage incentive spirometry, continue pulse ox monitoring, follow ABGs                 ***GI***  [x] Tolerating PO consistent carb diet.     [x] Protonix   Bowel regimen with Miralax and senna  Reglan for gut motility     ***Heme***  HSQ for DVT plavix    ***Renal***  Continue to monitor I/Os, BUN/Creatinine.   Replete lytes PRN  Marie present      ***ID***  Cefuroxime for perioperative antibiotic coverage     ***Endocrine***  [x]  DM2: HbA1c 8.2%                - [x] Insulin gtt                - Need tight glycemic control to prevent wound infection.          Patient requires continuous monitoring with bedside rhythm monitoring, pulse oximetry monitoring, and continuous central venous and arterial pressure monitoring; and intermittent blood gas analysis. Care plan discussed with the ICU care team.   Patient remained critical, at risk for life threatening decompensation.    I have spent 30 minutes providing critical care management to this patient.    By signing my name below, I, Dl Rodríguez, attest that this documentation has been prepared under the direction and in the presence of MAURICIO Erickson   Electronically signed: Joseph Alvarez, 07-18-23 @ 07:07    I, MAURICIO Erickson, personally performed the services described in this documentation. all medical record entries made by the joseph were at my direction and in my presence. I have reviewed the chart and agree that the record reflects my personal performance and is accurate and complete  Electronically signed: MAURICIO Erickson

## 2023-07-18 NOTE — PHYSICAL THERAPY INITIAL EVALUATION ADULT - PERTINENT HX OF CURRENT PROBLEM, REHAB EVAL
63 year old male with PMHx HTN, T2DM on metformin who presents to [facility] ED after +stress test 7/11 at 2pm. Patient afterwards ~@1530 began complaining of severe chest pressure with radiation to L arm/shoulder and generalized weakness. Pt states he had similar chest pain ~1 month ago but notes chest pain at that time resolved. Patient admits to extensive family history of CAD. Patient denies SOB, palpitations, history of CAD, HF, or recent illness. CXR:Interval removal of endotracheal tube. Left basilar opacity, likely pleural effusion and/or atelectasis.

## 2023-07-18 NOTE — PHYSICAL THERAPY INITIAL EVALUATION ADULT - ADDITIONAL COMMENTS
pt lives with wife in private house 4 steps to enter, first floor set up. pt independent with mobility and did not use assistive device

## 2023-07-18 NOTE — PHYSICAL THERAPY INITIAL EVALUATION ADULT - FUNCTIONAL LIMITATIONS, PT EVAL
Patient states she received results back from her EMG with neurologist Dr Claros. Patient states she has carpal tunnel and was advised to follow up with PCP for the next steps. Please advise      Patient Name: Bernadine Sahu  Caller Name: Patient  Callback Number: [phone]      Thank you,  Manuela Remy     self-care

## 2023-07-18 NOTE — PROGRESS NOTE ADULT - PROBLEM SELECTOR PLAN 1
Will continue current insulin regimen for now.   Postop IV insulin for glycemic control   Transition off IV insulin once tolerating PO intake improves  Will continue monitoring FS, log, and glucose trends, will Follow up.  Patient counseled for compliance with consistent low carb diet and exercise as tolerated outpatient.

## 2023-07-18 NOTE — PROGRESS NOTE ADULT - SUBJECTIVE AND OBJECTIVE BOX
Chief complaint  Patient is a 63y old  Male who presents with a chief complaint of CAD (18 Jul 2023 07:06)         Labs and Fingersticks  CAPILLARY BLOOD GLUCOSE  220 (18 Jul 2023 09:00)  128 (18 Jul 2023 08:00)  119 (18 Jul 2023 07:00)  121 (18 Jul 2023 06:00)  117 (18 Jul 2023 05:00)  122 (18 Jul 2023 04:00)  126 (18 Jul 2023 03:00)  154 (18 Jul 2023 02:00)  165 (18 Jul 2023 01:00)  144 (17 Jul 2023 23:00)  134 (17 Jul 2023 22:00)  105 (17 Jul 2023 21:00)  107 (17 Jul 2023 20:00)  126 (17 Jul 2023 19:00)      POCT Blood Glucose.: 220 mg/dL (18 Jul 2023 09:00)  POCT Blood Glucose.: 128 mg/dL (18 Jul 2023 07:45)  POCT Blood Glucose.: 119 mg/dL (18 Jul 2023 06:46)  POCT Blood Glucose.: 121 mg/dL (18 Jul 2023 06:02)  POCT Blood Glucose.: 117 mg/dL (18 Jul 2023 04:47)  POCT Blood Glucose.: 122 mg/dL (18 Jul 2023 03:53)  POCT Blood Glucose.: 126 mg/dL (18 Jul 2023 02:55)  POCT Blood Glucose.: 154 mg/dL (18 Jul 2023 01:48)  POCT Blood Glucose.: 165 mg/dL (18 Jul 2023 01:01)  POCT Blood Glucose.: 144 mg/dL (17 Jul 2023 22:54)  POCT Blood Glucose.: 134 mg/dL (17 Jul 2023 21:53)  POCT Blood Glucose.: 103 mg/dL (17 Jul 2023 20:58)  POCT Blood Glucose.: 107 mg/dL (17 Jul 2023 19:52)  POCT Blood Glucose.: 126 mg/dL (17 Jul 2023 18:47)  POCT Blood Glucose.: 146 mg/dL (17 Jul 2023 17:59)  POCT Blood Glucose.: 159 mg/dL (17 Jul 2023 17:10)  POCT Blood Glucose.: 189 mg/dL (17 Jul 2023 16:17)  POCT Blood Glucose.: 172 mg/dL (17 Jul 2023 15:20)      Anion Gap: 15 (07-18 @ 00:47)  Anion Gap: 19 *H* (07-17 @ 15:33)      Calcium: 9.1 (07-18 @ 00:47)  Calcium: 11.1 *H* (07-17 @ 15:33)  Albumin: 4.3 (07-18 @ 00:47)  Albumin: 4.2 (07-17 @ 15:33)    Alanine Aminotransferase (ALT/SGPT): 32 (07-18 @ 00:47)  Alanine Aminotransferase (ALT/SGPT): 44 (07-17 @ 15:33)  Alkaline Phosphatase: 41 (07-18 @ 00:47)  Alkaline Phosphatase: 66 (07-17 @ 15:33)  Aspartate Aminotransferase (AST/SGOT): 42 *H* (07-18 @ 00:47)  Aspartate Aminotransferase (AST/SGOT): 55 *H* (07-17 @ 15:33)        07-18    137  |  102  |  12  ----------------------------<  156<H>  4.9   |  20<L>  |  0.96    Ca    9.1      18 Jul 2023 00:47  Phos  3.1     07-18  Mg     1.9     07-18    TPro  6.3  /  Alb  4.3  /  TBili  0.8  /  DBili  x   /  AST  42<H>  /  ALT  32  /  AlkPhos  41  07-18                        10.2   11.45 )-----------( 149      ( 18 Jul 2023 00:44 )             30.7     Medications  MEDICATIONS  (STANDING):  acetaminophen     Tablet .. 650 milliGRAM(s) Oral every 6 hours  aMIOdarone    Tablet 400 milliGRAM(s) Oral two times a day  ascorbic acid 500 milliGRAM(s) Oral two times a day  aspirin enteric coated 81 milliGRAM(s) Oral daily  atorvastatin 40 milliGRAM(s) Oral at bedtime  cefuroxime  IVPB 1500 milliGRAM(s) IV Intermittent every 8 hours  chlorhexidine 2% Cloths 1 Application(s) Topical daily  clopidogrel Tablet 75 milliGRAM(s) Oral daily  dextrose 50% Injectable 50 milliLiter(s) IV Push every 15 minutes  dextrose 50% Injectable 25 milliLiter(s) IV Push every 15 minutes  gabapentin 100 milliGRAM(s) Oral every 8 hours  heparin   Injectable 5000 Unit(s) SubCutaneous every 8 hours  insulin regular Infusion 3 Unit(s)/Hr (3 mL/Hr) IV Continuous <Continuous>  metoclopramide Injectable 10 milliGRAM(s) IV Push every 8 hours  metoprolol tartrate 25 milliGRAM(s) Oral two times a day  niCARdipine Infusion 5 mG/Hr (25 mL/Hr) IV Continuous <Continuous>  nitroprusside Infusion 0.5 MICROgram(s)/kG/Min (6.16 mL/Hr) IV Continuous <Continuous>  pantoprazole    Tablet 40 milliGRAM(s) Oral before breakfast  polyethylene glycol 3350 17 Gram(s) Oral daily  potassium chloride  10 mEq/50 mL IVPB 10 milliEquivalent(s) IV Intermittent every 1 hour  potassium chloride  10 mEq/50 mL IVPB 10 milliEquivalent(s) IV Intermittent every 1 hour  potassium chloride  10 mEq/50 mL IVPB 10 milliEquivalent(s) IV Intermittent every 1 hour  senna 2 Tablet(s) Oral at bedtime  sodium chloride 0.9%. 1000 milliLiter(s) (10 mL/Hr) IV Continuous <Continuous>      Physical Exam  General: Patient comfortable in bed   Vital Signs Last 12 Hrs  T(F): 99 (07-18-23 @ 08:00), Max: 100 (07-18-23 @ 00:00)  HR: 81 (07-18-23 @ 09:00) (78 - 89)  BP: --  BP(mean): --  RR: 14 (07-18-23 @ 09:00) (13 - 25)  SpO2: 96% (07-18-23 @ 09:00) (92% - 100%)    CVS: S1S2   Respiratory: No wheezing, no crepitations  GI: Abdomen soft, bowel sounds positive  Musculoskeletal:  moves all extremities  : Voiding         Chief complaint  Patient is a 63y old  Male who presents with a chief complaint of CAD (18 Jul 2023 07:06)     Labs and Fingersticks  CAPILLARY BLOOD GLUCOSE  220 (18 Jul 2023 09:00)  128 (18 Jul 2023 08:00)  119 (18 Jul 2023 07:00)  121 (18 Jul 2023 06:00)  117 (18 Jul 2023 05:00)  122 (18 Jul 2023 04:00)  126 (18 Jul 2023 03:00)  154 (18 Jul 2023 02:00)  165 (18 Jul 2023 01:00)  144 (17 Jul 2023 23:00)  134 (17 Jul 2023 22:00)  105 (17 Jul 2023 21:00)  107 (17 Jul 2023 20:00)  126 (17 Jul 2023 19:00)      POCT Blood Glucose.: 220 mg/dL (18 Jul 2023 09:00)  POCT Blood Glucose.: 128 mg/dL (18 Jul 2023 07:45)  POCT Blood Glucose.: 119 mg/dL (18 Jul 2023 06:46)  POCT Blood Glucose.: 121 mg/dL (18 Jul 2023 06:02)  POCT Blood Glucose.: 117 mg/dL (18 Jul 2023 04:47)  POCT Blood Glucose.: 122 mg/dL (18 Jul 2023 03:53)  POCT Blood Glucose.: 126 mg/dL (18 Jul 2023 02:55)  POCT Blood Glucose.: 154 mg/dL (18 Jul 2023 01:48)  POCT Blood Glucose.: 165 mg/dL (18 Jul 2023 01:01)  POCT Blood Glucose.: 144 mg/dL (17 Jul 2023 22:54)  POCT Blood Glucose.: 134 mg/dL (17 Jul 2023 21:53)  POCT Blood Glucose.: 103 mg/dL (17 Jul 2023 20:58)  POCT Blood Glucose.: 107 mg/dL (17 Jul 2023 19:52)  POCT Blood Glucose.: 126 mg/dL (17 Jul 2023 18:47)  POCT Blood Glucose.: 146 mg/dL (17 Jul 2023 17:59)  POCT Blood Glucose.: 159 mg/dL (17 Jul 2023 17:10)  POCT Blood Glucose.: 189 mg/dL (17 Jul 2023 16:17)  POCT Blood Glucose.: 172 mg/dL (17 Jul 2023 15:20)      Anion Gap: 15 (07-18 @ 00:47)  Anion Gap: 19 *H* (07-17 @ 15:33)      Calcium: 9.1 (07-18 @ 00:47)  Calcium: 11.1 *H* (07-17 @ 15:33)  Albumin: 4.3 (07-18 @ 00:47)  Albumin: 4.2 (07-17 @ 15:33)    Alanine Aminotransferase (ALT/SGPT): 32 (07-18 @ 00:47)  Alanine Aminotransferase (ALT/SGPT): 44 (07-17 @ 15:33)  Alkaline Phosphatase: 41 (07-18 @ 00:47)  Alkaline Phosphatase: 66 (07-17 @ 15:33)  Aspartate Aminotransferase (AST/SGOT): 42 *H* (07-18 @ 00:47)  Aspartate Aminotransferase (AST/SGOT): 55 *H* (07-17 @ 15:33)        07-18    137  |  102  |  12  ----------------------------<  156<H>  4.9   |  20<L>  |  0.96    Ca    9.1      18 Jul 2023 00:47  Phos  3.1     07-18  Mg     1.9     07-18    TPro  6.3  /  Alb  4.3  /  TBili  0.8  /  DBili  x   /  AST  42<H>  /  ALT  32  /  AlkPhos  41  07-18                        10.2   11.45 )-----------( 149      ( 18 Jul 2023 00:44 )             30.7     Medications  MEDICATIONS  (STANDING):  acetaminophen     Tablet .. 650 milliGRAM(s) Oral every 6 hours  aMIOdarone    Tablet 400 milliGRAM(s) Oral two times a day  ascorbic acid 500 milliGRAM(s) Oral two times a day  aspirin enteric coated 81 milliGRAM(s) Oral daily  atorvastatin 40 milliGRAM(s) Oral at bedtime  cefuroxime  IVPB 1500 milliGRAM(s) IV Intermittent every 8 hours  chlorhexidine 2% Cloths 1 Application(s) Topical daily  clopidogrel Tablet 75 milliGRAM(s) Oral daily  dextrose 50% Injectable 50 milliLiter(s) IV Push every 15 minutes  dextrose 50% Injectable 25 milliLiter(s) IV Push every 15 minutes  gabapentin 100 milliGRAM(s) Oral every 8 hours  heparin   Injectable 5000 Unit(s) SubCutaneous every 8 hours  insulin regular Infusion 3 Unit(s)/Hr (3 mL/Hr) IV Continuous <Continuous>  metoclopramide Injectable 10 milliGRAM(s) IV Push every 8 hours  metoprolol tartrate 25 milliGRAM(s) Oral two times a day  niCARdipine Infusion 5 mG/Hr (25 mL/Hr) IV Continuous <Continuous>  nitroprusside Infusion 0.5 MICROgram(s)/kG/Min (6.16 mL/Hr) IV Continuous <Continuous>  pantoprazole    Tablet 40 milliGRAM(s) Oral before breakfast  polyethylene glycol 3350 17 Gram(s) Oral daily  potassium chloride  10 mEq/50 mL IVPB 10 milliEquivalent(s) IV Intermittent every 1 hour  potassium chloride  10 mEq/50 mL IVPB 10 milliEquivalent(s) IV Intermittent every 1 hour  potassium chloride  10 mEq/50 mL IVPB 10 milliEquivalent(s) IV Intermittent every 1 hour  senna 2 Tablet(s) Oral at bedtime  sodium chloride 0.9%. 1000 milliLiter(s) (10 mL/Hr) IV Continuous <Continuous>      Physical Exam  General: Patient comfortable in bed   Vital Signs Last 12 Hrs  T(F): 99 (07-18-23 @ 08:00), Max: 100 (07-18-23 @ 00:00)  HR: 81 (07-18-23 @ 09:00) (78 - 89)  BP: --  BP(mean): --  RR: 14 (07-18-23 @ 09:00) (13 - 25)  SpO2: 96% (07-18-23 @ 09:00) (92% - 100%)    CVS: S1S2   Respiratory: No wheezing, no crepitations  GI: Abdomen soft, bowel sounds positive  Musculoskeletal:  moves all extremities  : Voiding

## 2023-07-18 NOTE — PROGRESS NOTE ADULT - ASSESSMENT
Assessment  DMT2: 63y Male with DM T2 with hyperglycemia, A1C 8.2%, was on oral meds at home, now s/p CABG, postop on IV insulin, extubated, diet being advanced to clears, not eating full meals.  Will need tight glycemic control for postop wound healing.   CAD: on medications, stable, monitored.   HTN: on antihypertensive medications, monitored, asymptomatic.  HLD: on high intensity statin.     Discussed plan and management wit Dr Lonnie Renee NP-TEAMS  Mellissa Fleming MD  Cell: [phone]  Office: [phone]               Assessment  DMT2: 63y Male with DM T2 with hyperglycemia, A1C 8.2%, was on oral meds at home, now s/p CABG, postop on IV insulin, extubated, diet being advanced to clears,  not eating full meals.  Will need tight glycemic control for postop wound healing.   CAD: on medications, stable, monitored.   HTN: on antihypertensive medications, monitored, asymptomatic.  HLD: on high intensity statin.     Discussed plan and management wit Dr Lonnie Renee NP-TEAMS  Mellissa Fleming MD  Cell: [phone]  Office: [phone]

## 2023-07-18 NOTE — PHYSICAL THERAPY INITIAL EVALUATION ADULT - GENERAL OBSERVATIONS, REHAB EVAL
pt recd sitting in chair (+)R IJ (+)tele (+)pulse ox (+)sinha (+)chest tube x 3 (+)IV (+)a-line (+)02 via nc @4L

## 2023-07-19 DIAGNOSIS — Z95.1 PRESENCE OF AORTOCORONARY BYPASS GRAFT: ICD-10-CM

## 2023-07-19 LAB
ALBUMIN SERPL ELPH-MCNC: 3.7 G/DL — SIGNIFICANT CHANGE UP (ref 3.3–5)
ALP SERPL-CCNC: 38 U/L — LOW (ref 40–120)
ALT FLD-CCNC: 24 U/L — SIGNIFICANT CHANGE UP (ref 10–45)
ANION GAP SERPL CALC-SCNC: 11 MMOL/L — SIGNIFICANT CHANGE UP (ref 5–17)
AST SERPL-CCNC: 27 U/L — SIGNIFICANT CHANGE UP (ref 10–40)
BILIRUB SERPL-MCNC: 0.3 MG/DL — SIGNIFICANT CHANGE UP (ref 0.2–1.2)
BUN SERPL-MCNC: 15 MG/DL — SIGNIFICANT CHANGE UP (ref 7–23)
CALCIUM SERPL-MCNC: 8.6 MG/DL — SIGNIFICANT CHANGE UP (ref 8.4–10.5)
CHLORIDE SERPL-SCNC: 101 MMOL/L — SIGNIFICANT CHANGE UP (ref 96–108)
CO2 SERPL-SCNC: 24 MMOL/L — SIGNIFICANT CHANGE UP (ref 22–31)
CREAT SERPL-MCNC: 0.95 MG/DL — SIGNIFICANT CHANGE UP (ref 0.5–1.3)
EGFR: 90 ML/MIN/1.73M2 — SIGNIFICANT CHANGE UP
GAS PNL BLDA: SIGNIFICANT CHANGE UP
GLUCOSE BLDC GLUCOMTR-MCNC: 111 MG/DL — HIGH (ref 70–99)
GLUCOSE BLDC GLUCOMTR-MCNC: 117 MG/DL — HIGH (ref 70–99)
GLUCOSE BLDC GLUCOMTR-MCNC: 127 MG/DL — HIGH (ref 70–99)
GLUCOSE BLDC GLUCOMTR-MCNC: 127 MG/DL — HIGH (ref 70–99)
GLUCOSE BLDC GLUCOMTR-MCNC: 137 MG/DL — HIGH (ref 70–99)
GLUCOSE BLDC GLUCOMTR-MCNC: 141 MG/DL — HIGH (ref 70–99)
GLUCOSE BLDC GLUCOMTR-MCNC: 144 MG/DL — HIGH (ref 70–99)
GLUCOSE BLDC GLUCOMTR-MCNC: 145 MG/DL — HIGH (ref 70–99)
GLUCOSE BLDC GLUCOMTR-MCNC: 146 MG/DL — HIGH (ref 70–99)
GLUCOSE BLDC GLUCOMTR-MCNC: 152 MG/DL — HIGH (ref 70–99)
GLUCOSE BLDC GLUCOMTR-MCNC: 158 MG/DL — HIGH (ref 70–99)
GLUCOSE BLDC GLUCOMTR-MCNC: 219 MG/DL — HIGH (ref 70–99)
GLUCOSE BLDC GLUCOMTR-MCNC: 243 MG/DL — HIGH (ref 70–99)
GLUCOSE SERPL-MCNC: 135 MG/DL — HIGH (ref 70–99)
HCT VFR BLD CALC: 27.5 % — LOW (ref 39–50)
HGB BLD-MCNC: 9.1 G/DL — LOW (ref 13–17)
MAGNESIUM SERPL-MCNC: 1.9 MG/DL — SIGNIFICANT CHANGE UP (ref 1.6–2.6)
MCHC RBC-ENTMCNC: 28.4 PG — SIGNIFICANT CHANGE UP (ref 27–34)
MCHC RBC-ENTMCNC: 33.1 GM/DL — SIGNIFICANT CHANGE UP (ref 32–36)
MCV RBC AUTO: 85.9 FL — SIGNIFICANT CHANGE UP (ref 80–100)
NRBC # BLD: 0 /100 WBCS — SIGNIFICANT CHANGE UP (ref 0–0)
PHOSPHATE SERPL-MCNC: 2.4 MG/DL — LOW (ref 2.5–4.5)
PLATELET # BLD AUTO: 137 K/UL — LOW (ref 150–400)
POTASSIUM SERPL-MCNC: 3.9 MMOL/L — SIGNIFICANT CHANGE UP (ref 3.5–5.3)
POTASSIUM SERPL-SCNC: 3.9 MMOL/L — SIGNIFICANT CHANGE UP (ref 3.5–5.3)
PROT SERPL-MCNC: 5.8 G/DL — LOW (ref 6–8.3)
RBC # BLD: 3.2 M/UL — LOW (ref 4.2–5.8)
RBC # FLD: 13.2 % — SIGNIFICANT CHANGE UP (ref 10.3–14.5)
SODIUM SERPL-SCNC: 136 MMOL/L — SIGNIFICANT CHANGE UP (ref 135–145)
WBC # BLD: 10.77 K/UL — HIGH (ref 3.8–10.5)
WBC # FLD AUTO: 10.77 K/UL — HIGH (ref 3.8–10.5)

## 2023-07-19 PROCEDURE — 71045 X-RAY EXAM CHEST 1 VIEW: CPT | Mod: 26,77

## 2023-07-19 PROCEDURE — 93010 ELECTROCARDIOGRAM REPORT: CPT

## 2023-07-19 PROCEDURE — 99291 CRITICAL CARE FIRST HOUR: CPT

## 2023-07-19 PROCEDURE — 71045 X-RAY EXAM CHEST 1 VIEW: CPT | Mod: 26

## 2023-07-19 RX ORDER — POTASSIUM CHLORIDE 20 MEQ
10 PACKET (EA) ORAL ONCE
Refills: 0 | Status: COMPLETED | OUTPATIENT
Start: 2023-07-19 | End: 2023-07-19

## 2023-07-19 RX ORDER — GLUCAGON INJECTION, SOLUTION 0.5 MG/.1ML
1 INJECTION, SOLUTION SUBCUTANEOUS ONCE
Refills: 0 | Status: DISCONTINUED | OUTPATIENT
Start: 2023-07-19 | End: 2023-07-23

## 2023-07-19 RX ORDER — INSULIN LISPRO 100/ML
11 VIAL (ML) SUBCUTANEOUS
Refills: 0 | Status: DISCONTINUED | OUTPATIENT
Start: 2023-07-19 | End: 2023-07-22

## 2023-07-19 RX ORDER — MAGNESIUM SULFATE 500 MG/ML
2 VIAL (ML) INJECTION ONCE
Refills: 0 | Status: COMPLETED | OUTPATIENT
Start: 2023-07-19 | End: 2023-07-19

## 2023-07-19 RX ORDER — METOPROLOL TARTRATE 50 MG
25 TABLET ORAL
Refills: 0 | Status: DISCONTINUED | OUTPATIENT
Start: 2023-07-19 | End: 2023-07-21

## 2023-07-19 RX ORDER — INSULIN GLARGINE 100 [IU]/ML
40 INJECTION, SOLUTION SUBCUTANEOUS AT BEDTIME
Refills: 0 | Status: DISCONTINUED | OUTPATIENT
Start: 2023-07-19 | End: 2023-07-21

## 2023-07-19 RX ORDER — INSULIN LISPRO 100/ML
VIAL (ML) SUBCUTANEOUS
Refills: 0 | Status: DISCONTINUED | OUTPATIENT
Start: 2023-07-19 | End: 2023-07-23

## 2023-07-19 RX ORDER — METOPROLOL TARTRATE 50 MG
50 TABLET ORAL EVERY 8 HOURS
Refills: 0 | Status: DISCONTINUED | OUTPATIENT
Start: 2023-07-19 | End: 2023-07-19

## 2023-07-19 RX ORDER — INSULIN LISPRO 100/ML
VIAL (ML) SUBCUTANEOUS AT BEDTIME
Refills: 0 | Status: DISCONTINUED | OUTPATIENT
Start: 2023-07-19 | End: 2023-07-23

## 2023-07-19 RX ORDER — DEXTROSE 50 % IN WATER 50 %
15 SYRINGE (ML) INTRAVENOUS ONCE
Refills: 0 | Status: DISCONTINUED | OUTPATIENT
Start: 2023-07-19 | End: 2023-07-23

## 2023-07-19 RX ORDER — FUROSEMIDE 40 MG
40 TABLET ORAL ONCE
Refills: 0 | Status: COMPLETED | OUTPATIENT
Start: 2023-07-19 | End: 2023-07-19

## 2023-07-19 RX ADMIN — INSULIN GLARGINE 40 UNIT(S): 100 INJECTION, SOLUTION SUBCUTANEOUS at 21:12

## 2023-07-19 RX ADMIN — Medication 650 MILLIGRAM(S): at 05:59

## 2023-07-19 RX ADMIN — HEPARIN SODIUM 5000 UNIT(S): 5000 INJECTION INTRAVENOUS; SUBCUTANEOUS at 14:40

## 2023-07-19 RX ADMIN — GABAPENTIN 100 MILLIGRAM(S): 400 CAPSULE ORAL at 14:40

## 2023-07-19 RX ADMIN — POLYETHYLENE GLYCOL 3350 17 GRAM(S): 17 POWDER, FOR SOLUTION ORAL at 11:56

## 2023-07-19 RX ADMIN — Medication 650 MILLIGRAM(S): at 00:06

## 2023-07-19 RX ADMIN — CLOPIDOGREL BISULFATE 75 MILLIGRAM(S): 75 TABLET, FILM COATED ORAL at 11:56

## 2023-07-19 RX ADMIN — Medication 25 MILLIGRAM(S): at 00:06

## 2023-07-19 RX ADMIN — PANTOPRAZOLE SODIUM 40 MILLIGRAM(S): 20 TABLET, DELAYED RELEASE ORAL at 06:00

## 2023-07-19 RX ADMIN — Medication 650 MILLIGRAM(S): at 22:25

## 2023-07-19 RX ADMIN — OXYCODONE HYDROCHLORIDE 10 MILLIGRAM(S): 5 TABLET ORAL at 16:03

## 2023-07-19 RX ADMIN — AMIODARONE HYDROCHLORIDE 400 MILLIGRAM(S): 400 TABLET ORAL at 05:29

## 2023-07-19 RX ADMIN — ATORVASTATIN CALCIUM 40 MILLIGRAM(S): 80 TABLET, FILM COATED ORAL at 21:06

## 2023-07-19 RX ADMIN — GABAPENTIN 100 MILLIGRAM(S): 400 CAPSULE ORAL at 21:05

## 2023-07-19 RX ADMIN — OXYCODONE HYDROCHLORIDE 10 MILLIGRAM(S): 5 TABLET ORAL at 22:55

## 2023-07-19 RX ADMIN — Medication 81 MILLIGRAM(S): at 11:56

## 2023-07-19 RX ADMIN — Medication 650 MILLIGRAM(S): at 17:11

## 2023-07-19 RX ADMIN — Medication 63.75 MILLIMOLE(S): at 04:51

## 2023-07-19 RX ADMIN — Medication 650 MILLIGRAM(S): at 05:29

## 2023-07-19 RX ADMIN — Medication 11 UNIT(S): at 16:39

## 2023-07-19 RX ADMIN — Medication 650 MILLIGRAM(S): at 23:05

## 2023-07-19 RX ADMIN — Medication 650 MILLIGRAM(S): at 17:17

## 2023-07-19 RX ADMIN — AMIODARONE HYDROCHLORIDE 400 MILLIGRAM(S): 400 TABLET ORAL at 17:10

## 2023-07-19 RX ADMIN — Medication 650 MILLIGRAM(S): at 12:50

## 2023-07-19 RX ADMIN — Medication 100 MILLIGRAM(S): at 04:10

## 2023-07-19 RX ADMIN — Medication 650 MILLIGRAM(S): at 00:36

## 2023-07-19 RX ADMIN — GABAPENTIN 100 MILLIGRAM(S): 400 CAPSULE ORAL at 05:29

## 2023-07-19 RX ADMIN — Medication 10 MILLIGRAM(S): at 21:06

## 2023-07-19 RX ADMIN — Medication 10 MILLIGRAM(S): at 05:28

## 2023-07-19 RX ADMIN — Medication 1: at 16:39

## 2023-07-19 RX ADMIN — OXYCODONE HYDROCHLORIDE 10 MILLIGRAM(S): 5 TABLET ORAL at 15:18

## 2023-07-19 RX ADMIN — HEPARIN SODIUM 5000 UNIT(S): 5000 INJECTION INTRAVENOUS; SUBCUTANEOUS at 21:06

## 2023-07-19 RX ADMIN — HEPARIN SODIUM 5000 UNIT(S): 5000 INJECTION INTRAVENOUS; SUBCUTANEOUS at 05:28

## 2023-07-19 RX ADMIN — Medication 25 GRAM(S): at 01:13

## 2023-07-19 RX ADMIN — Medication 500 MILLIGRAM(S): at 17:10

## 2023-07-19 RX ADMIN — OXYCODONE HYDROCHLORIDE 10 MILLIGRAM(S): 5 TABLET ORAL at 23:57

## 2023-07-19 RX ADMIN — CHLORHEXIDINE GLUCONATE 1 APPLICATION(S): 213 SOLUTION TOPICAL at 12:03

## 2023-07-19 RX ADMIN — Medication 10 MILLIGRAM(S): at 14:40

## 2023-07-19 RX ADMIN — Medication 50 MILLIEQUIVALENT(S): at 01:13

## 2023-07-19 RX ADMIN — Medication 40 MILLIGRAM(S): at 01:13

## 2023-07-19 RX ADMIN — Medication 500 MILLIGRAM(S): at 05:29

## 2023-07-19 RX ADMIN — Medication 25 MILLIGRAM(S): at 17:10

## 2023-07-19 RX ADMIN — Medication 650 MILLIGRAM(S): at 11:56

## 2023-07-19 RX ADMIN — Medication 11 UNIT(S): at 11:55

## 2023-07-19 RX ADMIN — SENNA PLUS 2 TABLET(S): 8.6 TABLET ORAL at 21:05

## 2023-07-19 NOTE — PROGRESS NOTE ADULT - SUBJECTIVE AND OBJECTIVE BOX
Chief complaint  Patient is a 63y old  Male who presents with a chief complaint of CAD (19 Jul 2023 08:32)         Labs and Fingersticks  CAPILLARY BLOOD GLUCOSE  111 (19 Jul 2023 06:00)  117 (19 Jul 2023 05:00)  137 (19 Jul 2023 04:00)  127 (19 Jul 2023 03:00)  145 (19 Jul 2023 02:00)  152 (19 Jul 2023 01:00)  126 (19 Jul 2023 00:00)  141 (18 Jul 2023 23:00)  157 (18 Jul 2023 22:00)  119 (18 Jul 2023 21:00)  95 (18 Jul 2023 20:00)  115 (18 Jul 2023 19:00)  119 (18 Jul 2023 18:00)  146 (18 Jul 2023 17:00)  157 (18 Jul 2023 16:00)  246 (18 Jul 2023 15:00)  229 (18 Jul 2023 14:00)      POCT Blood Glucose.: 146 mg/dL (19 Jul 2023 11:16)  POCT Blood Glucose.: 219 mg/dL (19 Jul 2023 10:02)  POCT Blood Glucose.: 243 mg/dL (19 Jul 2023 09:07)  POCT Blood Glucose.: 141 mg/dL (19 Jul 2023 08:03)  POCT Blood Glucose.: 127 mg/dL (19 Jul 2023 06:47)  POCT Blood Glucose.: 111 mg/dL (19 Jul 2023 06:00)  POCT Blood Glucose.: 117 mg/dL (19 Jul 2023 05:16)  POCT Blood Glucose.: 137 mg/dL (19 Jul 2023 04:02)  POCT Blood Glucose.: 127 mg/dL (19 Jul 2023 03:02)  POCT Blood Glucose.: 145 mg/dL (19 Jul 2023 02:03)  POCT Blood Glucose.: 152 mg/dL (19 Jul 2023 01:16)  POCT Blood Glucose.: 141 mg/dL (18 Jul 2023 23:08)  POCT Blood Glucose.: 157 mg/dL (18 Jul 2023 22:06)  POCT Blood Glucose.: 119 mg/dL (18 Jul 2023 21:05)  POCT Blood Glucose.: 95 mg/dL (18 Jul 2023 20:18)  POCT Blood Glucose.: 115 mg/dL (18 Jul 2023 18:50)  POCT Blood Glucose.: 119 mg/dL (18 Jul 2023 18:18)  POCT Blood Glucose.: 142 mg/dL (18 Jul 2023 16:59)  POCT Blood Glucose.: 157 mg/dL (18 Jul 2023 16:22)  POCT Blood Glucose.: 246 mg/dL (18 Jul 2023 14:54)  POCT Blood Glucose.: 229 mg/dL (18 Jul 2023 13:52)  POCT Blood Glucose.: 179 mg/dL (18 Jul 2023 13:11)      Anion Gap: 11 (07-19 @ 00:29)  Anion Gap: 15 (07-18 @ 00:47)  Anion Gap: 19 *H* (07-17 @ 15:33)      Calcium: 8.6 (07-19 @ 00:29)  Calcium: 9.1 (07-18 @ 00:47)  Calcium: 11.1 *H* (07-17 @ 15:33)  Albumin: 3.7 (07-19 @ 00:29)  Albumin: 4.3 (07-18 @ 00:47)  Albumin: 4.2 (07-17 @ 15:33)    Alanine Aminotransferase (ALT/SGPT): 24 (07-19 @ 00:29)  Alanine Aminotransferase (ALT/SGPT): 32 (07-18 @ 00:47)  Alanine Aminotransferase (ALT/SGPT): 44 (07-17 @ 15:33)  Alkaline Phosphatase: 38 *L* (07-19 @ 00:29)  Alkaline Phosphatase: 41 (07-18 @ 00:47)  Alkaline Phosphatase: 66 (07-17 @ 15:33)  Aspartate Aminotransferase (AST/SGOT): 27 (07-19 @ 00:29)  Aspartate Aminotransferase (AST/SGOT): 42 *H* (07-18 @ 00:47)  Aspartate Aminotransferase (AST/SGOT): 55 *H* (07-17 @ 15:33)        07-19    136  |  101  |  15  ----------------------------<  135<H>  3.9   |  24  |  0.95    Ca    8.6      19 Jul 2023 00:29  Phos  2.4     07-19  Mg     1.9     07-19    TPro  5.8<L>  /  Alb  3.7  /  TBili  0.3  /  DBili  x   /  AST  27  /  ALT  24  /  AlkPhos  38<L>  07-19                        9.1    10.77 )-----------( 137      ( 19 Jul 2023 00:29 )             27.5     Medications  MEDICATIONS  (STANDING):  acetaminophen     Tablet .. 650 milliGRAM(s) Oral every 6 hours  aMIOdarone    Tablet 400 milliGRAM(s) Oral two times a day  ascorbic acid 500 milliGRAM(s) Oral two times a day  aspirin enteric coated 81 milliGRAM(s) Oral daily  atorvastatin 40 milliGRAM(s) Oral at bedtime  bisacodyl Suppository 10 milliGRAM(s) Rectal once  chlorhexidine 2% Cloths 1 Application(s) Topical daily  clopidogrel Tablet 75 milliGRAM(s) Oral daily  dextrose 50% Injectable 25 milliLiter(s) IV Push every 15 minutes  dextrose 50% Injectable 50 milliLiter(s) IV Push every 15 minutes  dextrose Oral Gel 15 Gram(s) Oral once  gabapentin 100 milliGRAM(s) Oral every 8 hours  glucagon  Injectable 1 milliGRAM(s) IntraMuscular once  heparin   Injectable 5000 Unit(s) SubCutaneous every 8 hours  insulin glargine Injectable (LANTUS) 40 Unit(s) SubCutaneous at bedtime  insulin lispro (ADMELOG) corrective regimen sliding scale   SubCutaneous at bedtime  insulin lispro (ADMELOG) corrective regimen sliding scale   SubCutaneous three times a day before meals  insulin lispro Injectable (ADMELOG) 11 Unit(s) SubCutaneous three times a day before meals  insulin regular Infusion 3 Unit(s)/Hr (3 mL/Hr) IV Continuous <Continuous>  metoclopramide Injectable 10 milliGRAM(s) IV Push every 8 hours  metoprolol tartrate 25 milliGRAM(s) Oral two times a day  pantoprazole    Tablet 40 milliGRAM(s) Oral before breakfast  polyethylene glycol 3350 17 Gram(s) Oral daily  potassium chloride  10 mEq/50 mL IVPB 10 milliEquivalent(s) IV Intermittent every 1 hour  potassium chloride  10 mEq/50 mL IVPB 10 milliEquivalent(s) IV Intermittent every 1 hour  potassium chloride  10 mEq/50 mL IVPB 10 milliEquivalent(s) IV Intermittent every 1 hour  senna 2 Tablet(s) Oral at bedtime  sodium chloride 0.9%. 1000 milliLiter(s) (10 mL/Hr) IV Continuous <Continuous>      Physical Exam  General: Patient comfortable in bed   Vital Signs Last 12 Hrs  T(F): 100 (07-19-23 @ 12:00), Max: 100 (07-19-23 @ 08:00)  HR: 85 (07-19-23 @ 12:00) (72 - 87)  BP: 133/61 (07-19-23 @ 12:00) (124/60 - 133/61)  BP(mean): 88 (07-19-23 @ 12:00) (87 - 88)  RR: 20 (07-19-23 @ 12:00) (10 - 23)  SpO2: 94% (07-19-23 @ 12:00) (93% - 97%)    CVS: S1S2   Respiratory: No wheezing, no crepitations  GI: Abdomen soft, bowel sounds positive  Musculoskeletal:  moves all extremities  : Voiding       Chief complaint  Patient is a 63y old  Male who presents with a chief complaint of CAD (19 Jul 2023 08:32)         Labs and Fingersticks  CAPILLARY BLOOD GLUCOSE  111 (19 Jul 2023 06:00)  117 (19 Jul 2023 05:00)  137 (19 Jul 2023 04:00)  127 (19 Jul 2023 03:00)  145 (19 Jul 2023 02:00)  152 (19 Jul 2023 01:00)  126 (19 Jul 2023 00:00)  141 (18 Jul 2023 23:00)  157 (18 Jul 2023 22:00)  119 (18 Jul 2023 21:00)  95 (18 Jul 2023 20:00)  115 (18 Jul 2023 19:00)  119 (18 Jul 2023 18:00)  146 (18 Jul 2023 17:00)  157 (18 Jul 2023 16:00)  246 (18 Jul 2023 15:00)  229 (18 Jul 2023 14:00)      POCT Blood Glucose.: 146 mg/dL (19 Jul 2023 11:16)  POCT Blood Glucose.: 219 mg/dL (19 Jul 2023 10:02)  POCT Blood Glucose.: 243 mg/dL (19 Jul 2023 09:07)  POCT Blood Glucose.: 141 mg/dL (19 Jul 2023 08:03)  POCT Blood Glucose.: 127 mg/dL (19 Jul 2023 06:47)  POCT Blood Glucose.: 111 mg/dL (19 Jul 2023 06:00)  POCT Blood Glucose.: 117 mg/dL (19 Jul 2023 05:16)  POCT Blood Glucose.: 137 mg/dL (19 Jul 2023 04:02)  POCT Blood Glucose.: 127 mg/dL (19 Jul 2023 03:02)  POCT Blood Glucose.: 145 mg/dL (19 Jul 2023 02:03)  POCT Blood Glucose.: 152 mg/dL (19 Jul 2023 01:16)  POCT Blood Glucose.: 141 mg/dL (18 Jul 2023 23:08)  POCT Blood Glucose.: 157 mg/dL (18 Jul 2023 22:06)  POCT Blood Glucose.: 119 mg/dL (18 Jul 2023 21:05)  POCT Blood Glucose.: 95 mg/dL (18 Jul 2023 20:18)  POCT Blood Glucose.: 115 mg/dL (18 Jul 2023 18:50)  POCT Blood Glucose.: 119 mg/dL (18 Jul 2023 18:18)  POCT Blood Glucose.: 142 mg/dL (18 Jul 2023 16:59)  POCT Blood Glucose.: 157 mg/dL (18 Jul 2023 16:22)  POCT Blood Glucose.: 246 mg/dL (18 Jul 2023 14:54)  POCT Blood Glucose.: 229 mg/dL (18 Jul 2023 13:52)  POCT Blood Glucose.: 179 mg/dL (18 Jul 2023 13:11)      Anion Gap: 11 (07-19 @ 00:29)  Anion Gap: 15 (07-18 @ 00:47)  Anion Gap: 19 *H* (07-17 @ 15:33)      Calcium: 8.6 (07-19 @ 00:29)  Calcium: 9.1 (07-18 @ 00:47)  Calcium: 11.1 *H* (07-17 @ 15:33)  Albumin: 3.7 (07-19 @ 00:29)  Albumin: 4.3 (07-18 @ 00:47)  Albumin: 4.2 (07-17 @ 15:33)    Alanine Aminotransferase (ALT/SGPT): 24 (07-19 @ 00:29)  Alanine Aminotransferase (ALT/SGPT): 32 (07-18 @ 00:47)  Alanine Aminotransferase (ALT/SGPT): 44 (07-17 @ 15:33)  Alkaline Phosphatase: 38 *L* (07-19 @ 00:29)  Alkaline Phosphatase: 41 (07-18 @ 00:47)  Alkaline Phosphatase: 66 (07-17 @ 15:33)  Aspartate Aminotransferase (AST/SGOT): 27 (07-19 @ 00:29)  Aspartate Aminotransferase (AST/SGOT): 42 *H* (07-18 @ 00:47)  Aspartate Aminotransferase (AST/SGOT): 55 *H* (07-17 @ 15:33)        07-19    136  |  101  |  15  ----------------------------<  135<H>  3.9   |  24  |  0.95    Ca    8.6      19 Jul 2023 00:29  Phos  2.4     07-19  Mg     1.9     07-19    TPro  5.8<L>  /  Alb  3.7  /  TBili  0.3  /  DBili  x   /  AST  27  /  ALT  24  /  AlkPhos  38<L>  07-19                        9.1    10.77 )-----------( 137      ( 19 Jul 2023 00:29 )             27.5     Medications  MEDICATIONS  (STANDING):  acetaminophen     Tablet .. 650 milliGRAM(s) Oral every 6 hours  aMIOdarone    Tablet 400 milliGRAM(s) Oral two times a day  ascorbic acid 500 milliGRAM(s) Oral two times a day  aspirin enteric coated 81 milliGRAM(s) Oral daily  atorvastatin 40 milliGRAM(s) Oral at bedtime  bisacodyl Suppository 10 milliGRAM(s) Rectal once  chlorhexidine 2% Cloths 1 Application(s) Topical daily  clopidogrel Tablet 75 milliGRAM(s) Oral daily  dextrose 50% Injectable 25 milliLiter(s) IV Push every 15 minutes  dextrose 50% Injectable 50 milliLiter(s) IV Push every 15 minutes  dextrose Oral Gel 15 Gram(s) Oral once  gabapentin 100 milliGRAM(s) Oral every 8 hours  glucagon  Injectable 1 milliGRAM(s) IntraMuscular once  heparin   Injectable 5000 Unit(s) SubCutaneous every 8 hours  insulin glargine Injectable (LANTUS) 40 Unit(s) SubCutaneous at bedtime  insulin lispro (ADMELOG) corrective regimen sliding scale   SubCutaneous at bedtime  insulin lispro (ADMELOG) corrective regimen sliding scale   SubCutaneous three times a day before meals  insulin lispro Injectable (ADMELOG) 11 Unit(s) SubCutaneous three times a day before meals  insulin regular Infusion 3 Unit(s)/Hr (3 mL/Hr) IV Continuous <Continuous>  metoclopramide Injectable 10 milliGRAM(s) IV Push every 8 hours  metoprolol tartrate 25 milliGRAM(s) Oral two times a day  pantoprazole    Tablet 40 milliGRAM(s) Oral before breakfast  polyethylene glycol 3350 17 Gram(s) Oral daily  potassium chloride  10 mEq/50 mL IVPB 10 milliEquivalent(s) IV Intermittent every 1 hour  potassium chloride  10 mEq/50 mL IVPB 10 milliEquivalent(s) IV Intermittent every 1 hour  potassium chloride  10 mEq/50 mL IVPB 10 milliEquivalent(s) IV Intermittent every 1 hour  senna 2 Tablet(s) Oral at bedtime  sodium chloride 0.9%. 1000 milliLiter(s) (10 mL/Hr) IV Continuous <Continuous>      Physical Exam  General: Patient comfortable in bed   Vital Signs Last 12 Hrs  T(F): 100 (07-19-23 @ 12:00), Max: 100 (07-19-23 @ 08:00)  HR: 85 (07-19-23 @ 12:00) (72 - 87)  BP: 133/61 (07-19-23 @ 12:00) (124/60 - 133/61)  BP(mean): 88 (07-19-23 @ 12:00) (87 - 88)  RR: 20 (07-19-23 @ 12:00) (10 - 23)  SpO2: 94% (07-19-23 @ 12:00) (93% - 97%)    CVS: S1S2   Respiratory: No wheezing, no crepitations  GI: Abdomen soft, bowel sounds positive  Musculoskeletal:  moves all extremities  : Voiding

## 2023-07-19 NOTE — PROGRESS NOTE ADULT - ASSESSMENT
Assessment  DMT2: 63y Male with DM T2 with hyperglycemia, A1C 8.2%, was on oral meds at home, now s/p CABG, postop on IV insulin, extubated, diet being advanced, transitioned to basal bolus insulin.  Will need tight glycemic control for postop wound healing.   CAD: on medications, stable, monitored.   HTN: on antihypertensive medications, monitored, asymptomatic.  HLD: on high intensity statin.     Discussed plan and management wit Dr Lonnie Renee NP-TEAMS  Mellissa Fleming MD  Cell: [phone]  Office: [phone]               Assessment  DMT2: 63y Male with DM T2 with hyperglycemia, A1C 8.2%, was on oral meds at home, now s/p CABG, postop on IV insulin, extubated, diet being advanced,  transitioned to basal bolus insulin.  Will need tight glycemic control for postop wound healing.   CAD: on medications, stable, monitored.   HTN: on antihypertensive medications, monitored, asymptomatic.  HLD: on high intensity statin.     Discussed plan and management wit Dr Lonnie Renee NP-TEAMS  Mellissa Fleming MD  Cell: [phone]  Office: [phone]

## 2023-07-19 NOTE — PROGRESS NOTE ADULT - ASSESSMENT
Patient is 63 year old male with PMHx HTN, T2DM on metformin who presents to [facility] ED after +stress test 7/11 at 2pm. Patient afterwards ~@1530 began complaining of severe chest pressure with radiation to L arm/shoulder and generalized weakness. Pt states he had similar chest pain ~1 month ago but notes chest pain at that time resolved. Patient admits to extensive family history of CAD. Patient denies SOB, palpitations, history of CAD, HF, or recent illness. Patient follows with cardiologist Dr. Posada.  Patient received Brilinta 180 mg x1 in Dr. Posada's office,   ED course- cardiology was called initially for STEMI alert. Upon review of EKG, pt does not meet STEMI criteria- , A fib RVR, LEON in aVR with ST depressions leads II, III, aVF, V3-V6. Patient with continued chest pressure. Pt s/p load with ASA/Heparin as per ACS protocol, 2.5 mg IVP Lopressor x1 and 25 mg PO Lopressor for rate control. Troponin 48.  7/12 patient had Cardiac Cath which revealed 3VCAD. Now for Tx to [facility] for Cardiac Surgery Evaluation.  7/17/23   C3-L  LIMA-LAD | SVG-OM | SVG-PDA  ef 40  Insulin infusion. epi for labile bp  Endo for glucose control  Beta blockers added  7/19 tx sdu

## 2023-07-19 NOTE — PROGRESS NOTE ADULT - SUPERVISING ATTENDING
Dr. Barron
Dr Barron
Dr. Barron
RADHA CROWE  ATTENDING, MD: 31 yo F with a past medical history of cardiomyopathy, pHTN, on coumadin for prophylaxis for cerebrovascular accident (with no hx of stroke) presents with chest pain (x about 10 days) and SOB, abd distention and B/L LE edema. Lower extremity edema has been worsening over about 6 weeks.  Patient states the SOB has been gradually worsening and is worse with exertion.  Patient is usually on 5L O2 NC at home.  Patient states chest pain this morning in the LEFT upper lateral chest was so bad this morning she was unable to move her shoulder.  Patient states chronic cough, unchanged.  Patient states intermittent N/V, NB.  Denies F/C.  Patient states she has a FHx of blood clots in her sister and leukemia in a sister.      PHYSICAL EXAM:    GENERAL: Patient is awake and alert and in no acute distress.  Non-toxic appearing.  A+Ox4.  Obese.  HEAD:  Airway patent.  No oropharyngeal edema.  No stridor.  Auricles are normal.    EYES: EOM grossly intact, PERRLA, conjunctiva non-injected and sclera clear  NECK: Supple, No vertebral point tenderness to palpation.  CHEST/LUNG: Lungs clear to auscultation bilaterally; no wheeze, no rhonchi,  no rales.    HEART: Regular rate and rhythm;   ABDOMEN: Soft, non-tender to palpation.  No rebound/no guarding.  Bowel sounds present x 4.   MSK/EXTREMITIES: No clubbing, cyanosis, or edema. Back is nontender, with no vertebral point tenderness to palpation, no CVAT.  Moving all 4 extremities.  Pitting B/L LE edema with superficial wound of the LEFT mid lower leg with weeping.  No erythema.     NEURO: Neurologically grossly intact.    PSYCH: Psychiatrically normal mood and affect.  No apparent risk to self or others.       DR. CROWE, ATTENDING MD:    I performed a face to face bedside interview with patient regarding history of present illness, review of symptoms and past medical history. I completed an independent physical exam.  I have discussed patient's plan of care with the team of health care providers.   I agree with note as stated above, having amended the EMR as needed to reflect my findings. I have discussed the assessment and plan of care.  This includes during the time I functioned as the attending physician for this patient.

## 2023-07-19 NOTE — PROGRESS NOTE ADULT - PROBLEM SELECTOR PLAN 1
Will start Lantus  40  u at bedtime.  Will start Admelog 11 u before each meal and continue Admelog correction scale coverage. Will continue monitoring FS and FU.   Will continue monitoring FS, log, and glucose trends, will Follow up.  Patient counseled for compliance with consistent low carb diet and exercise as tolerated outpatient.

## 2023-07-19 NOTE — PROGRESS NOTE ADULT - SUBJECTIVE AND OBJECTIVE BOX
Patient seen and examined at the bedside.    Remained critically ill on continuous ICU monitoring.    OBJECTIVE:  Vital Signs Last 24 Hrs  T(C): 37.4 (19 Jul 2023 04:00), Max: 37.9 (18 Jul 2023 20:00)  T(F): 99.3 (19 Jul 2023 04:00), Max: 100.2 (18 Jul 2023 20:00)  HR: 77 (19 Jul 2023 07:00) (72 - 86)  BP: --  BP(mean): --  RR: 16 (19 Jul 2023 07:00) (6 - 29)  SpO2: 95% (19 Jul 2023 07:00) (90% - 99%)    Parameters below as of 19 Jul 2023 04:00  Patient On (Oxygen Delivery Method): nasal cannula  O2 Flow (L/min): 5        Physical Exam:   General: NAD   Neurology: nonfocal   Eyes: bilateral pupils equal and reactive   ENT/Neck: Neck supple, trachea midline, No JVD   Respiratory: Clear bilaterally   CV: S1S2, no murmurs        [x] Sternal dressing, [x] Mediastinal CT, [x] Pleural CT        [x] Sinus rhythm  Abdominal: Soft, NT, ND +BS   Extremities: 1-2+ pedal edema noted, + peripheral pulses   Skin: No Rashes, Hematoma, Ecchymosis                           Assessment:  63 year old male with PMHx HTN, T2DM on metformin who presents to [facility] ED after +stress test 7/11     CAD s/p CABG 7/17/2023  Hemodynamic instability  Hypovolemia  Post op acute respiratory insufficiency  Acute blood loss anemia  Thrombocytopenia      Plan:   ***Neuro***   [x] Nonfocal    Post operative neuro assessment   Tylenol, Gabapentin, PRN Oxycodone, and PRN Dilaudid for pain management.       ***Cardiovascular***  Invasive hemodynamic monitoring, assess perfusion indices   Start beta blocker  SR / CVP 5 / MAP 65 / Hct 27.5%/ Lactate 1.0  Reassessment of hemodynamics post resuscitation    Monitor chest tube outputs    Continue beta blocker  PO Amiodarone for afib prophylaxis   [x] SQ Heparin for VTE prophylaxis   [x]  ASA  [x] Statin, plavix    ***Pulmonary***  [x] 5L NC  Encourage incentive spirometry, continue pulse ox monitoring, follow ABGs             ***GI***  [x] Tolerating Regular diet  [x] Protonix   Bowel regimen with Miralax and senna  Reglan for gut motility     ***Heme***  HSQ for DVT plavix    ***Renal***  Continue to monitor I/Os, BUN/Creatinine.   Replete lytes PRN  Frank present      ***ID***  No current antibiotic coverage    ***Endocrine***  [x]  DM2: HbA1c 8.2%                - [x] Insulin gtt                - Need tight glycemic control to prevent wound infection.          Patient requires continuous monitoring with bedside rhythm monitoring, pulse oximetry monitoring, and continuous central venous and arterial pressure monitoring; and intermittent blood gas analysis. Care plan discussed with the ICU care team.   Patient remained critical, at risk for life threatening decompensation.    I have spent 30 minutes providing critical care management to this patient.    By signing my name below, I, Geovanna Hager, attest that this documentation has been prepared under the direction and in the presence of MAURICIO Mckeon  Electronically signed: Dale Hansen, 07-19-23 @ 08:32    I, MAURICIO Mckeon, personally performed the services described in this documentation. all medical record entries made by the scribe were at my direction and in my presence. I have reviewed the chart and agree that the record reflects my personal performance and is accurate and complete  Electronically signed: MAURICIO Mckeon Patient seen and examined at the bedside.    Remained critically ill on continuous ICU monitoring.    OBJECTIVE:  Vital Signs Last 24 Hrs  T(C): 37.4 (19 Jul 2023 04:00), Max: 37.9 (18 Jul 2023 20:00)  T(F): 99.3 (19 Jul 2023 04:00), Max: 100.2 (18 Jul 2023 20:00)  HR: 77 (19 Jul 2023 07:00) (72 - 86)  RR: 16 (19 Jul 2023 07:00) (6 - 29)  SpO2: 95% (19 Jul 2023 07:00) (90% - 99%)    Parameters below as of 19 Jul 2023 04:00  Patient On (Oxygen Delivery Method): nasal cannula  O2 Flow (L/min): 5    Physical Exam:   General: NAD   Neurology: nonfocal   Eyes: bilateral pupils equal and reactive   ENT/Neck: Neck supple, trachea midline, No JVD   Respiratory: Clear bilaterally   CV: S1S2, no murmurs        [x] Sternal dressing, [x] Mediastinal CT, [x] Pleural CT        [x] Sinus rhythm  Abdominal: Soft, NT, ND +BS   Extremities: 1+ pedal edema noted, + peripheral pulses   Skin: No Rashes, Hematoma, Ecchymosis, + PW                            Assessment:  63 year old male with PMHx HTN, T2DM on metformin who presents to [facility] ED after +stress test 7/11     CAD s/p CABG 7/17/2023  Hemodynamic instability  Hypovolemia  Post op acute respiratory insufficiency  Acute blood loss anemia  Thrombocytopenia      Plan:   ***Neuro***   [x] Nonfocal    Post operative neuro assessment   Tylenol, Gabapentin, PRN Oxycodone, and PRN Dilaudid for pain management.       ***Cardiovascular***  Invasive hemodynamic monitoring, assess perfusion indices   Start beta blocker  SR / CVP 5 / MAP 65 / Hct 27.5%/ Lactate 1.0  Reassessment of hemodynamics post resuscitation    Monitor chest tube outputs    Continue beta blocker  PO Amiodarone for afib prophylaxis   [x] SQ Heparin for VTE prophylaxis   [x]  ASA  [x] Statin, plavix    ***Pulmonary***  [x] 5L NC  Encourage incentive spirometry, continue pulse ox monitoring, follow ABGs             ***GI***  [x] Tolerating Regular diet  [x] Protonix   Bowel regimen with Miralax and senna  Reglan for gut motility     ***Heme***  HSQ for DVT plavix    ***Renal***  Continue to monitor I/Os, BUN/Creatinine.   Replete lytes PRN    ***ID***  No current antibiotic coverage    ***Endocrine***  [x]  DM2: HbA1c 8.2%                - [x] Insulin gtt-->transitioning to premeal and lantus as per endocrine consult recs              - Need tight glycemic control to prevent wound infection.      Plan to transfer patient to SDU     Patient requires continuous monitoring with bedside rhythm monitoring, pulse oximetry monitoring, and continuous central venous and arterial pressure monitoring; and intermittent blood gas analysis. Care plan discussed with the ICU care team.   Patient remained critical, at risk for life threatening decompensation.    I have spent 30 minutes providing critical care management to this patient.    By signing my name below, I, Geovanna Hager, attest that this documentation has been prepared under the direction and in the presence of MAURICIO Mckeon  Electronically signed: Dale Hansen, 07-19-23 @ 08:32    I, MAURICIO Mckeon, personally performed the services described in this documentation. all medical record entries made by the robbiibe were at my direction and in my presence. I have reviewed the chart and agree that the record reflects my personal performance and is accurate and complete  Electronically signed: MAURICIO Mckeon

## 2023-07-19 NOTE — PROGRESS NOTE ADULT - PROBLEM SELECTOR PLAN 1
Asa, Statin, B-blocker, Chest PT,  Incentive spirometry, wound care and assessment.  Ambulate   Glucose control, endo  Was not on insulin preop

## 2023-07-19 NOTE — PROGRESS NOTE ADULT - SUBJECTIVE AND OBJECTIVE BOX
VITAL SIGNS    Telemetry:  nsr 80    Vital Signs Last 24 Hrs  T(C): 37.8 (23 @ 12:00), Max: 37.9 (23 @ 20:00)  T(F): 100 (23 @ 12:00), Max: 100.2 (23 @ 20:00)  HR: 85 (23 @ 12:00) (72 - 87)  BP: 133/61 (23 @ 12:00) (124/60 - 133/61)  RR: 20 (23 @ 12:00) (6 - 29)  SpO2: 94% (23 @ 12:00) (90% - 98%)                    @ 07:01  -   @ 07:00  --------------------------------------------------------  IN: 2417.5 mL / OUT: 2884 mL / NET: -466.5 mL     @ 07:01  -   @ 13:36  --------------------------------------------------------  IN: 62 mL / OUT: 346 mL / NET: -284 mL          Daily     Daily Weight in k.9 (2023 00:00)            CAPILLARY BLOOD GLUCOSE  111 (2023 06:00)  117 (2023 05:00)  137 (2023 04:00)  127 (2023 03:00)  145 (2023 02:00)  152 (2023 01:00)  126 (2023 00:00)  141 (2023 23:00)  157 (2023 22:00)  119 (2023 21:00)  95 (2023 20:00)  115 (2023 19:00)  119 (2023 18:00)  146 (2023 17:00)  157 (2023 16:00)  246 (2023 15:00)  229 (2023 14:00)      POCT Blood Glucose.: 146 mg/dL (2023 11:16)  POCT Blood Glucose.: 219 mg/dL (2023 10:02)  POCT Blood Glucose.: 243 mg/dL (2023 09:07)  POCT Blood Glucose.: 141 mg/dL (2023 08:03)  POCT Blood Glucose.: 127 mg/dL (2023 06:47)  POCT Blood Glucose.: 111 mg/dL (2023 06:00)  POCT Blood Glucose.: 117 mg/dL (2023 05:16)  POCT Blood Glucose.: 137 mg/dL (2023 04:02)  POCT Blood Glucose.: 127 mg/dL (2023 03:02)  POCT Blood Glucose.: 145 mg/dL (2023 02:03)  POCT Blood Glucose.: 152 mg/dL (2023 01:16)  POCT Blood Glucose.: 141 mg/dL (2023 23:08)  POCT Blood Glucose.: 157 mg/dL (2023 22:06)  POCT Blood Glucose.: 119 mg/dL (2023 21:05)  POCT Blood Glucose.: 95 mg/dL (2023 20:18)  POCT Blood Glucose.: 115 mg/dL (2023 18:50)  POCT Blood Glucose.: 119 mg/dL (2023 18:18)  POCT Blood Glucose.: 142 mg/dL (2023 16:59)  POCT Blood Glucose.: 157 mg/dL (2023 16:22)  POCT Blood Glucose.: 246 mg/dL (2023 14:54)  POCT Blood Glucose.: 229 mg/dL (2023 13:52)            Drains:     MS         [  ] Drainage:                 L Pleural  [  ]  Drainage:                R Pleural  [  ]  Drainage:    Pacing Wires        [  ]   Settings:                                  Isolated  [  ]    Coumadin    [ ] YES          [  ]      NO                                   PHYSICAL EXAM        Neurology: alert and oriented x 3, nonfocal, no gross deficits  CV : s1 s2 RRR  Sternal Wound :  CDI , Stable  Lungs: cta  Abdomen: soft, nontender, nondistended, positive bowel sounds, last bowel movement                       chest tubes  :    voiding / sinha - sbd         Extremities:      edema   /  -   calve tenderness ,    L leg  /  R leg  incisions cdi          acetaminophen     Tablet .. 650 milliGRAM(s) Oral every 6 hours  aMIOdarone    Tablet 400 milliGRAM(s) Oral two times a day  ascorbic acid 500 milliGRAM(s) Oral two times a day  aspirin enteric coated 81 milliGRAM(s) Oral daily  atorvastatin 40 milliGRAM(s) Oral at bedtime  bisacodyl Suppository 10 milliGRAM(s) Rectal once  chlorhexidine 2% Cloths 1 Application(s) Topical daily  clopidogrel Tablet 75 milliGRAM(s) Oral daily  dextrose 50% Injectable 25 milliLiter(s) IV Push every 15 minutes  dextrose 50% Injectable 50 milliLiter(s) IV Push every 15 minutes  dextrose Oral Gel 15 Gram(s) Oral once  gabapentin 100 milliGRAM(s) Oral every 8 hours  glucagon  Injectable 1 milliGRAM(s) IntraMuscular once  heparin   Injectable 5000 Unit(s) SubCutaneous every 8 hours  insulin glargine Injectable (LANTUS) 40 Unit(s) SubCutaneous at bedtime  insulin lispro (ADMELOG) corrective regimen sliding scale   SubCutaneous at bedtime  insulin lispro (ADMELOG) corrective regimen sliding scale   SubCutaneous three times a day before meals  insulin lispro Injectable (ADMELOG) 11 Unit(s) SubCutaneous three times a day before meals  insulin regular Infusion 3 Unit(s)/Hr IV Continuous <Continuous>  metoclopramide Injectable 10 milliGRAM(s) IV Push every 8 hours  metoprolol tartrate 25 milliGRAM(s) Oral two times a day  oxyCODONE    IR 10 milliGRAM(s) Oral every 4 hours PRN  oxyCODONE    IR 5 milliGRAM(s) Oral every 4 hours PRN  pantoprazole    Tablet 40 milliGRAM(s) Oral before breakfast  polyethylene glycol 3350 17 Gram(s) Oral daily  senna 2 Tablet(s) Oral at bedtime  sodium chloride 0.9%. 1000 milliLiter(s) IV Continuous <Continuous>                    Physical Therapy Rec:   Home  [  ]   Home w/ PT  [  ]  Rehab  [  ]  Discussed with Cardiothoracic Team at AM rounds.     VITAL SIGNS    Telemetry:  nsr 80    Vital Signs Last 24 Hrs  T(C): 37.8 (23 @ 12:00), Max: 37.9 (23 @ 20:00)  T(F): 100 (23 @ 12:00), Max: 100.2 (23 @ 20:00)  HR: 85 (23 @ 12:00) (72 - 87)  BP: 133/61 (23 @ 12:00) (124/60 - 133/61)  RR: 20 (23 @ 12:00) (6 - 29)  SpO2: 94% (23 @ 12:00) (90% - 98%)                    @ 07:01  -   @ 07:00  --------------------------------------------------------  IN: 2417.5 mL / OUT: 2884 mL / NET: -466.5 mL     @ 07:01  -   @ 13:36  --------------------------------------------------------  IN: 62 mL / OUT: 346 mL / NET: -284 mL          Daily     Daily Weight in k.9 (2023 00:00)            CAPILLARY BLOOD GLUCOSE  111 (2023 06:00)  117 (2023 05:00)  137 (2023 04:00)  127 (2023 03:00)  145 (2023 02:00)  152 (2023 01:00)  126 (2023 00:00)  141 (2023 23:00)  157 (2023 22:00)  119 (2023 21:00)  95 (2023 20:00)  115 (2023 19:00)  119 (2023 18:00)  146 (2023 17:00)  157 (2023 16:00)  246 (2023 15:00)  229 (2023 14:00)      POCT Blood Glucose.: 146 mg/dL (2023 11:16)  POCT Blood Glucose.: 219 mg/dL (2023 10:02)  POCT Blood Glucose.: 243 mg/dL (2023 09:07)  POCT Blood Glucose.: 141 mg/dL (2023 08:03)  POCT Blood Glucose.: 127 mg/dL (2023 06:47)  POCT Blood Glucose.: 111 mg/dL (2023 06:00)  POCT Blood Glucose.: 117 mg/dL (2023 05:16)  POCT Blood Glucose.: 137 mg/dL (2023 04:02)  POCT Blood Glucose.: 127 mg/dL (2023 03:02)  POCT Blood Glucose.: 145 mg/dL (2023 02:03)  POCT Blood Glucose.: 152 mg/dL (2023 01:16)  POCT Blood Glucose.: 141 mg/dL (2023 23:08)  POCT Blood Glucose.: 157 mg/dL (2023 22:06)  POCT Blood Glucose.: 119 mg/dL (2023 21:05)  POCT Blood Glucose.: 95 mg/dL (2023 20:18)  POCT Blood Glucose.: 115 mg/dL (2023 18:50)  POCT Blood Glucose.: 119 mg/dL (2023 18:18)  POCT Blood Glucose.: 142 mg/dL (2023 16:59)  POCT Blood Glucose.: 157 mg/dL (2023 16:22)  POCT Blood Glucose.: 246 mg/dL (2023 14:54)  POCT Blood Glucose.: 229 mg/dL (2023 13:52)            Drains:     MS         [  x] Drainage:                 L Pleural  [  ]x  Drainage:                R Pleural  [  ]  Drainage:    Pacing Wires        [  ]   Settings:                                  Isolated  [x  ]    Coumadin    [ ] YES          [ x ]      NO                                   PHYSICAL EXAM        Neurology: alert and oriented x 3, nonfocal, no gross deficits  CV : s1 s2 RRR  Sternal Wound :  CDI , Stable  Lungs: cta  Abdomen: soft, nontender, nondistended, positive bowel sounds,                      chest tubes x 2  :    voiding        Extremities:    -  edema   /  -   calve tenderness ,    L leg   incisions cdi          acetaminophen     Tablet .. 650 milliGRAM(s) Oral every 6 hours  aMIOdarone    Tablet 400 milliGRAM(s) Oral two times a day  ascorbic acid 500 milliGRAM(s) Oral two times a day  aspirin enteric coated 81 milliGRAM(s) Oral daily  atorvastatin 40 milliGRAM(s) Oral at bedtime  bisacodyl Suppository 10 milliGRAM(s) Rectal once  chlorhexidine 2% Cloths 1 Application(s) Topical daily  clopidogrel Tablet 75 milliGRAM(s) Oral daily  dextrose 50% Injectable 25 milliLiter(s) IV Push every 15 minutes  dextrose 50% Injectable 50 milliLiter(s) IV Push every 15 minutes  dextrose Oral Gel 15 Gram(s) Oral once  gabapentin 100 milliGRAM(s) Oral every 8 hours  glucagon  Injectable 1 milliGRAM(s) IntraMuscular once  heparin   Injectable 5000 Unit(s) SubCutaneous every 8 hours  insulin glargine Injectable (LANTUS) 40 Unit(s) SubCutaneous at bedtime  insulin lispro (ADMELOG) corrective regimen sliding scale   SubCutaneous at bedtime  insulin lispro (ADMELOG) corrective regimen sliding scale   SubCutaneous three times a day before meals  insulin lispro Injectable (ADMELOG) 11 Unit(s) SubCutaneous three times a day before meals  insulin regular Infusion 3 Unit(s)/Hr IV Continuous <Continuous>  metoclopramide Injectable 10 milliGRAM(s) IV Push every 8 hours  metoprolol tartrate 25 milliGRAM(s) Oral two times a day  oxyCODONE    IR 10 milliGRAM(s) Oral every 4 hours PRN  oxyCODONE    IR 5 milliGRAM(s) Oral every 4 hours PRN  pantoprazole    Tablet 40 milliGRAM(s) Oral before breakfast  polyethylene glycol 3350 17 Gram(s) Oral daily  senna 2 Tablet(s) Oral at bedtime  sodium chloride 0.9%. 1000 milliLiter(s) IV Continuous <Continuous>                    Physical Therapy Rec:   Home  [  ]   Home w/ PT  [  ]  Rehab  [  ]  Discussed with Cardiothoracic Team at AM rounds.

## 2023-07-20 LAB
ALBUMIN SERPL ELPH-MCNC: 3.6 G/DL — SIGNIFICANT CHANGE UP (ref 3.3–5)
ALP SERPL-CCNC: 47 U/L — SIGNIFICANT CHANGE UP (ref 40–120)
ALT FLD-CCNC: 18 U/L — SIGNIFICANT CHANGE UP (ref 10–45)
ANION GAP SERPL CALC-SCNC: 11 MMOL/L — SIGNIFICANT CHANGE UP (ref 5–17)
AST SERPL-CCNC: 20 U/L — SIGNIFICANT CHANGE UP (ref 10–40)
BILIRUB SERPL-MCNC: 0.5 MG/DL — SIGNIFICANT CHANGE UP (ref 0.2–1.2)
BUN SERPL-MCNC: 14 MG/DL — SIGNIFICANT CHANGE UP (ref 7–23)
CALCIUM SERPL-MCNC: 8.8 MG/DL — SIGNIFICANT CHANGE UP (ref 8.4–10.5)
CHLORIDE SERPL-SCNC: 100 MMOL/L — SIGNIFICANT CHANGE UP (ref 96–108)
CO2 SERPL-SCNC: 26 MMOL/L — SIGNIFICANT CHANGE UP (ref 22–31)
CREAT SERPL-MCNC: 0.82 MG/DL — SIGNIFICANT CHANGE UP (ref 0.5–1.3)
EGFR: 99 ML/MIN/1.73M2 — SIGNIFICANT CHANGE UP
GLUCOSE BLDC GLUCOMTR-MCNC: 118 MG/DL — HIGH (ref 70–99)
GLUCOSE BLDC GLUCOMTR-MCNC: 132 MG/DL — HIGH (ref 70–99)
GLUCOSE BLDC GLUCOMTR-MCNC: 146 MG/DL — HIGH (ref 70–99)
GLUCOSE BLDC GLUCOMTR-MCNC: 148 MG/DL — HIGH (ref 70–99)
GLUCOSE BLDC GLUCOMTR-MCNC: 158 MG/DL — HIGH (ref 70–99)
GLUCOSE BLDC GLUCOMTR-MCNC: 162 MG/DL — HIGH (ref 70–99)
GLUCOSE SERPL-MCNC: 127 MG/DL — HIGH (ref 70–99)
HCT VFR BLD CALC: 28.5 % — LOW (ref 39–50)
HGB BLD-MCNC: 9.6 G/DL — LOW (ref 13–17)
MAGNESIUM SERPL-MCNC: 2 MG/DL — SIGNIFICANT CHANGE UP (ref 1.6–2.6)
MCHC RBC-ENTMCNC: 29.1 PG — SIGNIFICANT CHANGE UP (ref 27–34)
MCHC RBC-ENTMCNC: 33.7 GM/DL — SIGNIFICANT CHANGE UP (ref 32–36)
MCV RBC AUTO: 86.4 FL — SIGNIFICANT CHANGE UP (ref 80–100)
NRBC # BLD: 0 /100 WBCS — SIGNIFICANT CHANGE UP (ref 0–0)
PHOSPHATE SERPL-MCNC: 2.2 MG/DL — LOW (ref 2.5–4.5)
PLATELET # BLD AUTO: 144 K/UL — LOW (ref 150–400)
POTASSIUM SERPL-MCNC: 4.3 MMOL/L — SIGNIFICANT CHANGE UP (ref 3.5–5.3)
POTASSIUM SERPL-SCNC: 4.3 MMOL/L — SIGNIFICANT CHANGE UP (ref 3.5–5.3)
PROT SERPL-MCNC: 5.9 G/DL — LOW (ref 6–8.3)
RBC # BLD: 3.3 M/UL — LOW (ref 4.2–5.8)
RBC # FLD: 13.4 % — SIGNIFICANT CHANGE UP (ref 10.3–14.5)
SODIUM SERPL-SCNC: 137 MMOL/L — SIGNIFICANT CHANGE UP (ref 135–145)
WBC # BLD: 10.59 K/UL — HIGH (ref 3.8–10.5)
WBC # FLD AUTO: 10.59 K/UL — HIGH (ref 3.8–10.5)

## 2023-07-20 PROCEDURE — 71045 X-RAY EXAM CHEST 1 VIEW: CPT | Mod: 26

## 2023-07-20 RX ORDER — SORBITOL SOLUTION 70 %
30 SOLUTION, ORAL MISCELLANEOUS ONCE
Refills: 0 | Status: COMPLETED | OUTPATIENT
Start: 2023-07-20 | End: 2023-07-20

## 2023-07-20 RX ORDER — ACETAMINOPHEN 500 MG
650 TABLET ORAL EVERY 6 HOURS
Refills: 0 | Status: DISCONTINUED | OUTPATIENT
Start: 2023-07-20 | End: 2023-07-23

## 2023-07-20 RX ADMIN — GABAPENTIN 100 MILLIGRAM(S): 400 CAPSULE ORAL at 21:05

## 2023-07-20 RX ADMIN — Medication 1: at 12:09

## 2023-07-20 RX ADMIN — Medication 500 MILLIGRAM(S): at 16:53

## 2023-07-20 RX ADMIN — Medication 650 MILLIGRAM(S): at 12:10

## 2023-07-20 RX ADMIN — PANTOPRAZOLE SODIUM 40 MILLIGRAM(S): 20 TABLET, DELAYED RELEASE ORAL at 05:26

## 2023-07-20 RX ADMIN — OXYCODONE HYDROCHLORIDE 10 MILLIGRAM(S): 5 TABLET ORAL at 22:01

## 2023-07-20 RX ADMIN — GABAPENTIN 100 MILLIGRAM(S): 400 CAPSULE ORAL at 13:40

## 2023-07-20 RX ADMIN — INSULIN GLARGINE 40 UNIT(S): 100 INJECTION, SOLUTION SUBCUTANEOUS at 21:05

## 2023-07-20 RX ADMIN — OXYCODONE HYDROCHLORIDE 10 MILLIGRAM(S): 5 TABLET ORAL at 21:05

## 2023-07-20 RX ADMIN — Medication 10 MILLIGRAM(S): at 13:40

## 2023-07-20 RX ADMIN — AMIODARONE HYDROCHLORIDE 400 MILLIGRAM(S): 400 TABLET ORAL at 05:25

## 2023-07-20 RX ADMIN — HEPARIN SODIUM 5000 UNIT(S): 5000 INJECTION INTRAVENOUS; SUBCUTANEOUS at 13:40

## 2023-07-20 RX ADMIN — Medication 650 MILLIGRAM(S): at 05:24

## 2023-07-20 RX ADMIN — Medication 11 UNIT(S): at 17:00

## 2023-07-20 RX ADMIN — HEPARIN SODIUM 5000 UNIT(S): 5000 INJECTION INTRAVENOUS; SUBCUTANEOUS at 21:05

## 2023-07-20 RX ADMIN — CHLORHEXIDINE GLUCONATE 1 APPLICATION(S): 213 SOLUTION TOPICAL at 09:45

## 2023-07-20 RX ADMIN — Medication 11 UNIT(S): at 12:08

## 2023-07-20 RX ADMIN — HEPARIN SODIUM 5000 UNIT(S): 5000 INJECTION INTRAVENOUS; SUBCUTANEOUS at 05:24

## 2023-07-20 RX ADMIN — Medication 30 MILLILITER(S): at 13:39

## 2023-07-20 RX ADMIN — Medication 81 MILLIGRAM(S): at 12:11

## 2023-07-20 RX ADMIN — POLYETHYLENE GLYCOL 3350 17 GRAM(S): 17 POWDER, FOR SOLUTION ORAL at 12:10

## 2023-07-20 RX ADMIN — Medication 10 MILLIGRAM(S): at 05:24

## 2023-07-20 RX ADMIN — CLOPIDOGREL BISULFATE 75 MILLIGRAM(S): 75 TABLET, FILM COATED ORAL at 12:11

## 2023-07-20 RX ADMIN — GABAPENTIN 100 MILLIGRAM(S): 400 CAPSULE ORAL at 05:25

## 2023-07-20 RX ADMIN — Medication 25 MILLIGRAM(S): at 16:53

## 2023-07-20 RX ADMIN — ATORVASTATIN CALCIUM 40 MILLIGRAM(S): 80 TABLET, FILM COATED ORAL at 21:05

## 2023-07-20 RX ADMIN — Medication 11 UNIT(S): at 07:56

## 2023-07-20 RX ADMIN — Medication 25 MILLIGRAM(S): at 05:25

## 2023-07-20 RX ADMIN — Medication 500 MILLIGRAM(S): at 05:25

## 2023-07-20 RX ADMIN — Medication 650 MILLIGRAM(S): at 12:35

## 2023-07-20 NOTE — CHART NOTE - NSCHARTNOTEFT_GEN_A_CORE
Nutrition Follow Up Note  Patient seen for: follow up / verbal consult for education  PMHx HTN, T2DM on metformin. 23   C3-L    Source: [x] Patient       [x] Medical Record        [] RN        [x] Family at bedside       [x] Other: NP    -If unable to interview patient: [] Trach/Vent/BiPAP  [] Disoriented/confused/inappropriate to interview    Diet Order:   Diet, Regular:   Consistent Carbohydrate {No Snacks} (CSTCHO)  DASH/TLC {Sodium & Cholesterol Restricted} (DASH) (23)    - Is current order appropriate/adequate? x[] Yes  []  No:     - PO intake :   [x] >75%  Adequate    [] 50-75%  Fair       [] <50%  Poor    Nutrition-related concerns:  -on insulin regimen to maintain glycemic control  -phosphorus low today,     GI:  Last BM  per flow sheets. Bowel Regimen? [x] Yes   [] No    Weights:   Dosing weight 82.1kg  Daily Weight in k (), Weight in k.9 (), Weight in k (), Weight in k.9 (), Weight in k.5 (-), Weight in k.3 (-15), Weight in k.1 ()    Nutritionally Pertinent MEDICATIONS  (STANDING):  ascorbic acid  atorvastatin  bisacodyl Suppository  dextrose 50% Injectable  dextrose 50% Injectable  dextrose Oral Gel  glucagon  Injectable  insulin glargine Injectable (LANTUS)  insulin lispro (ADMELOG) corrective regimen sliding scale  insulin lispro (ADMELOG) corrective regimen sliding scale  insulin lispro Injectable (ADMELOG)  insulin regular Infusion  metoprolol tartrate  pantoprazole    Tablet  polyethylene glycol 3350  senna  sodium chloride 0.9%.    Pertinent Labs:  @ 07:16: Na 137, BUN 14, Cr 0.82, <H>, K+ 4.3, Phos 2.2<L>, Mg 2.0, Alk Phos 47, ALT/SGPT 18, AST/SGOT 20, HbA1c --    A1C with Estimated Average Glucose Result: 8.2 % (23 @ 03:35)    Finger Sticks:  POCT Blood Glucose.: 162 mg/dL ( @ 11:34)  POCT Blood Glucose.: 132 mg/dL ( @ 07:59)  POCT Blood Glucose.: 144 mg/dL ( @ 20:45)  POCT Blood Glucose.: 158 mg/dL ( @ 16:36)      Skin per nursing documentation: No pressure injuries noted. mid sternal incision   Edema per nursing documentation: 1+ generalized     Estimated Needs using IBW 64.6kg  27-32kcal/kg 1808-2067kcal/day  1.2-1.4g/kg 77-90g protein/day  defer fluid needs to team    Previous Nutrition Diagnosis: Food & Nutrition Related Knowledge Deficit, Altered Nutrition Related Lab Values  Nutrition Diagnosis is: [x] ongoing  [] resolved [] not applicable     Nutrition Care Plan:  [x] In Progress  [] Achieved  [] Not applicable    New Nutrition Diagnosis: increased nutrient needs related to increased demand for nutrients as evidenced by mid sternal incision     Nutrition Interventions:  7/15: (1) partially meets; needs review/practice  verbalization; Provided education on Carbohydrate Consistent diet including sources of carbohydrates, portion sizes, pairing protein with carbohydrates, limiting sugar sweetened beverages in diet and the importance of consistent eating pattern to help optimize glycemic control. Discussed DASH diet education. Reviewed foods high in Na and cholesterol to avoid. Reviewed ways to decrease Na in your diet, discussed meal and snack options, tips for eating out; provided written Heart Healthy Eating Nutrition Therapy handout to Pt.  Carbohydrate Counting for People with Diabetes, Diabetes Label Reading Nutrition Tips, Plate Method for Diabetes   Heart Healthy Nutrition Therapy, Sodium Content of Foods, Sodium Free Flavoring Tips  TODAY : Provided education on Carbohydrate Consistent diet including sources of carbohydrates, portion sizes, pairing protein with carbohydrates, limiting sugar sweetened beverages in diet and the importance of consistent eating pattern to help optimize glycemic control.     Recommendations:      -continue current diet  -RD to add double protein to lunch to aid in meeting nutrient needs  -continue vitamin C to aid in incision healing  Monitoring and Evaluation:   Continue to monitor nutritional intake, tolerance to diet prescription, weights, labs, skin integrity    RD remains available upon request and will follow up per protocol  Josefina Valle, MS, RD, CDN Nutrition Follow Up Note  Patient seen for: follow up / verbal consult for education  PMHx HTN, T2DM on metformin. 23   C3-L    Source: [x] Patient       [x] Medical Record        [] RN        [x] Family at bedside       [x] Other: NP    -If unable to interview patient: [] Trach/Vent/BiPAP  [] Disoriented/confused/inappropriate to interview    Diet Order:   Diet, Regular:   Consistent Carbohydrate {No Snacks} (CSTCHO)  DASH/TLC {Sodium & Cholesterol Restricted} (DASH) (23)    - Is current order appropriate/adequate? x[] Yes  []  No:     - PO intake :   [x] >75%  Adequate    [] 50-75%  Fair       [] <50%  Poor    Nutrition-related concerns:  -on insulin regimen to maintain glycemic control  -phosphorus low today,     GI:  Last BM  per flow sheets. Bowel Regimen? [x] Yes   [] No    Weights:   Dosing weight 82.1kg  Daily Weight in k (), Weight in k.9 (), Weight in k (), Weight in k.9 (), Weight in k.5 (-), Weight in k.3 (-15), Weight in k.1 ()    Nutritionally Pertinent MEDICATIONS  (STANDING):  ascorbic acid  atorvastatin  bisacodyl Suppository  dextrose 50% Injectable  dextrose 50% Injectable  dextrose Oral Gel  glucagon  Injectable  insulin glargine Injectable (LANTUS)  insulin lispro (ADMELOG) corrective regimen sliding scale  insulin lispro (ADMELOG) corrective regimen sliding scale  insulin lispro Injectable (ADMELOG)  insulin regular Infusion  metoprolol tartrate  pantoprazole    Tablet  polyethylene glycol 3350  senna  sodium chloride 0.9%.    Pertinent Labs:  @ 07:16: Na 137, BUN 14, Cr 0.82, <H>, K+ 4.3, Phos 2.2<L>, Mg 2.0, Alk Phos 47, ALT/SGPT 18, AST/SGOT 20, HbA1c --    A1C with Estimated Average Glucose Result: 8.2 % (23 @ 03:35)    Finger Sticks:  POCT Blood Glucose.: 162 mg/dL ( @ 11:34)  POCT Blood Glucose.: 132 mg/dL ( @ 07:59)  POCT Blood Glucose.: 144 mg/dL ( @ 20:45)  POCT Blood Glucose.: 158 mg/dL ( @ 16:36)      Skin per nursing documentation: No pressure injuries noted. mid sternal incision   Edema per nursing documentation: 1+ generalized     Estimated Needs using IBW 64.6kg  27-32kcal/kg 1808-2067kcal/day  1.2-1.4g/kg 77-90g protein/day  defer fluid needs to team    Previous Nutrition Diagnosis: Food & Nutrition Related Knowledge Deficit, Altered Nutrition Related Lab Values  Nutrition Diagnosis is: [x] ongoing  [] resolved [] not applicable     Nutrition Care Plan:  [x] In Progress  [] Achieved  [] Not applicable    New Nutrition Diagnosis: increased nutrient needs related to increased demand for nutrients as evidenced by mid sternal incision     Nutrition Interventions:  7/15: (1) partially meets; needs review/practice  verbalization; Provided education on Carbohydrate Consistent diet including sources of carbohydrates, portion sizes, pairing protein with carbohydrates, limiting sugar sweetened beverages in diet and the importance of consistent eating pattern to help optimize glycemic control. Discussed DASH diet education. Reviewed foods high in Na and cholesterol to avoid. Reviewed ways to decrease Na in your diet, discussed meal and snack options, tips for eating out; provided written Heart Healthy Eating Nutrition Therapy handout to Pt.  Carbohydrate Counting for People with Diabetes, Diabetes Label Reading Nutrition Tips, Plate Method for Diabetes   Heart Healthy Nutrition Therapy, Sodium Content of Foods, Sodium Free Flavoring Tips  TODAY : Provided education on Carbohydrate Consistent diet including sources of carbohydrates, portion sizes, pairing protein with carbohydrates, limiting sugar sweetened beverages in diet and the importance of consistent eating pattern to help optimize glycemic control. Emphasized importance of adequate lean protein intake and optimal blood glucose levels for incision healing. Also discussed importance of limiting sodium intake as well as heart healthy food options. Pt made aware RD to remain available.     Recommendations:      -continue current diet  -RD to add double protein to lunch to aid in meeting nutrient needs  -continue vitamin C to aid in incision healing    Monitoring and Evaluation:   Continue to monitor nutritional intake, tolerance to diet prescription, weights, labs, skin integrity    RD remains available upon request and will follow up per protocol  Josefina Valle, MS, RD, CDN

## 2023-07-20 NOTE — PROGRESS NOTE ADULT - ASSESSMENT
Assessment  DMT2: 63y Male with DM T2 with hyperglycemia, A1C 8.2%, was on oral meds at home, now s/p CABG, postop on IV insulin, extubated, diet being advanced,  transitioned to basal bolus insulin. GLucose improving.   Will need tight glycemic control for postop wound healing.   CAD: on medications, stable, monitored.   HTN: on antihypertensive medications, monitored, asymptomatic.  HLD: on high intensity statin.     Discussed plan and management wit Dr Lonnie Renee NP-TEAMS  Mellissa Fleming MD  Cell: [phone]  Office: [phone]               Assessment  DMT2: 63y Male with DM T2 with hyperglycemia, A1C 8.2%, was on oral meds at home, now s/p CABG, postop on IV insulin, extubated, diet being advanced, transitioned to basal bolus insulin. GLucose improving.   Will need tight glycemic control for postop wound healing.   CAD: on medications, stable, monitored.   HTN: on antihypertensive medications, monitored, asymptomatic.  HLD: on high intensity statin.     Discussed plan and management wit Dr Lonnie Renee NP-TEAMS  Mellissa Fleming MD  Cell: [phone]  Office: [phone]

## 2023-07-20 NOTE — PROGRESS NOTE ADULT - SUBJECTIVE AND OBJECTIVE BOX
Chief complaint  Patient is a 63y old  Male who presents with a chief complaint of CAD (19 Jul 2023 13:35)         Labs and Fingersticks  CAPILLARY BLOOD GLUCOSE      POCT Blood Glucose.: 162 mg/dL (20 Jul 2023 11:34)  POCT Blood Glucose.: 132 mg/dL (20 Jul 2023 07:59)  POCT Blood Glucose.: 144 mg/dL (19 Jul 2023 20:45)  POCT Blood Glucose.: 158 mg/dL (19 Jul 2023 16:36)      Anion Gap: 11 (07-20 @ 07:16)  Anion Gap: 11 (07-19 @ 00:29)      Calcium: 8.8 (07-20 @ 07:16)  Calcium: 8.6 (07-19 @ 00:29)  Albumin: 3.6 (07-20 @ 07:16)  Albumin: 3.7 (07-19 @ 00:29)    Alanine Aminotransferase (ALT/SGPT): 18 (07-20 @ 07:16)  Alanine Aminotransferase (ALT/SGPT): 24 (07-19 @ 00:29)  Alkaline Phosphatase: 47 (07-20 @ 07:16)  Alkaline Phosphatase: 38 *L* (07-19 @ 00:29)  Aspartate Aminotransferase (AST/SGOT): 20 (07-20 @ 07:16)  Aspartate Aminotransferase (AST/SGOT): 27 (07-19 @ 00:29)        07-20    137  |  100  |  14  ----------------------------<  127<H>  4.3   |  26  |  0.82    Ca    8.8      20 Jul 2023 07:16  Phos  2.2     07-20  Mg     2.0     07-20    TPro  5.9<L>  /  Alb  3.6  /  TBili  0.5  /  DBili  x   /  AST  20  /  ALT  18  /  AlkPhos  47  07-20                        9.6    10.59 )-----------( 144      ( 20 Jul 2023 07:16 )             28.5     Medications  MEDICATIONS  (STANDING):  acetaminophen     Tablet .. 650 milliGRAM(s) Oral every 6 hours  ascorbic acid 500 milliGRAM(s) Oral two times a day  aspirin enteric coated 81 milliGRAM(s) Oral daily  atorvastatin 40 milliGRAM(s) Oral at bedtime  bisacodyl Suppository 10 milliGRAM(s) Rectal once  chlorhexidine 2% Cloths 1 Application(s) Topical daily  clopidogrel Tablet 75 milliGRAM(s) Oral daily  dextrose 50% Injectable 50 milliLiter(s) IV Push every 15 minutes  dextrose 50% Injectable 25 milliLiter(s) IV Push every 15 minutes  dextrose Oral Gel 15 Gram(s) Oral once  gabapentin 100 milliGRAM(s) Oral every 8 hours  glucagon  Injectable 1 milliGRAM(s) IntraMuscular once  heparin   Injectable 5000 Unit(s) SubCutaneous every 8 hours  insulin glargine Injectable (LANTUS) 40 Unit(s) SubCutaneous at bedtime  insulin lispro (ADMELOG) corrective regimen sliding scale   SubCutaneous three times a day before meals  insulin lispro (ADMELOG) corrective regimen sliding scale   SubCutaneous at bedtime  insulin lispro Injectable (ADMELOG) 11 Unit(s) SubCutaneous three times a day before meals  insulin regular Infusion 3 Unit(s)/Hr (3 mL/Hr) IV Continuous <Continuous>  metoclopramide Injectable 10 milliGRAM(s) IV Push every 8 hours  metoprolol tartrate 25 milliGRAM(s) Oral two times a day  pantoprazole    Tablet 40 milliGRAM(s) Oral before breakfast  polyethylene glycol 3350 17 Gram(s) Oral daily  senna 2 Tablet(s) Oral at bedtime  sodium chloride 0.9%. 1000 milliLiter(s) (10 mL/Hr) IV Continuous <Continuous>      Physical Exam  General: Patient comfortable in bed   Vital Signs Last 12 Hrs  T(F): 98.2 (07-20-23 @ 10:55), Max: 99.2 (07-20-23 @ 06:57)  HR: 73 (07-20-23 @ 11:15) (72 - 78)  BP: 115/58 (07-20-23 @ 11:15) (107/57 - 127/60)  BP(mean): 80 (07-20-23 @ 10:55) (76 - 80)  RR: 18 (07-20-23 @ 10:55) (18 - 18)  SpO2: 93% (07-20-23 @ 11:15) (93% - 96%)    CVS: S1S2   Respiratory: No wheezing, no crepitations  GI: Abdomen soft, bowel sounds positive  Musculoskeletal:  moves all extremities  : Voiding        Chief complaint  Patient is a 63y old  Male who presents with a chief complaint of CAD (19 Jul 2023 13:35)         Labs and Fingersticks  CAPILLARY BLOOD GLUCOSE      POCT Blood Glucose.: 162 mg/dL (20 Jul 2023 11:34)  POCT Blood Glucose.: 132 mg/dL (20 Jul 2023 07:59)  POCT Blood Glucose.: 144 mg/dL (19 Jul 2023 20:45)  POCT Blood Glucose.: 158 mg/dL (19 Jul 2023 16:36)      Anion Gap: 11 (07-20 @ 07:16)  Anion Gap: 11 (07-19 @ 00:29)      Calcium: 8.8 (07-20 @ 07:16)  Calcium: 8.6 (07-19 @ 00:29)  Albumin: 3.6 (07-20 @ 07:16)  Albumin: 3.7 (07-19 @ 00:29)    Alanine Aminotransferase (ALT/SGPT): 18 (07-20 @ 07:16)  Alanine Aminotransferase (ALT/SGPT): 24 (07-19 @ 00:29)  Alkaline Phosphatase: 47 (07-20 @ 07:16)  Alkaline Phosphatase: 38 *L* (07-19 @ 00:29)  Aspartate Aminotransferase (AST/SGOT): 20 (07-20 @ 07:16)  Aspartate Aminotransferase (AST/SGOT): 27 (07-19 @ 00:29)        07-20    137  |  100  |  14  ----------------------------<  127<H>  4.3   |  26  |  0.82    Ca    8.8      20 Jul 2023 07:16  Phos  2.2     07-20  Mg     2.0     07-20    TPro  5.9<L>  /  Alb  3.6  /  TBili  0.5  /  DBili  x   /  AST  20  /  ALT  18  /  AlkPhos  47  07-20                        9.6    10.59 )-----------( 144      ( 20 Jul 2023 07:16 )             28.5     Medications  MEDICATIONS  (STANDING):  acetaminophen     Tablet .. 650 milliGRAM(s) Oral every 6 hours  ascorbic acid 500 milliGRAM(s) Oral two times a day  aspirin enteric coated 81 milliGRAM(s) Oral daily  atorvastatin 40 milliGRAM(s) Oral at bedtime  bisacodyl Suppository 10 milliGRAM(s) Rectal once  chlorhexidine 2% Cloths 1 Application(s) Topical daily  clopidogrel Tablet 75 milliGRAM(s) Oral daily  dextrose 50% Injectable 50 milliLiter(s) IV Push every 15 minutes  dextrose 50% Injectable 25 milliLiter(s) IV Push every 15 minutes  dextrose Oral Gel 15 Gram(s) Oral once  gabapentin 100 milliGRAM(s) Oral every 8 hours  glucagon  Injectable 1 milliGRAM(s) IntraMuscular once  heparin   Injectable 5000 Unit(s) SubCutaneous every 8 hours  insulin glargine Injectable (LANTUS) 40 Unit(s) SubCutaneous at bedtime  insulin lispro (ADMELOG) corrective regimen sliding scale   SubCutaneous three times a day before meals  insulin lispro (ADMELOG) corrective regimen sliding scale   SubCutaneous at bedtime  insulin lispro Injectable (ADMELOG) 11 Unit(s) SubCutaneous three times a day before meals  insulin regular Infusion 3 Unit(s)/Hr (3 mL/Hr) IV Continuous <Continuous>  metoclopramide Injectable 10 milliGRAM(s) IV Push every 8 hours  metoprolol tartrate 25 milliGRAM(s) Oral two times a day  pantoprazole    Tablet 40 milliGRAM(s) Oral before breakfast  polyethylene glycol 3350 17 Gram(s) Oral daily  senna 2 Tablet(s) Oral at bedtime  sodium chloride 0.9%. 1000 milliLiter(s) (10 mL/Hr) IV Continuous <Continuous>      Physical Exam  General: Patient comfortable in bed   Vital Signs Last 12 Hrs  T(F): 98.2 (07-20-23 @ 10:55), Max: 99.2 (07-20-23 @ 06:57)  HR: 73 (07-20-23 @ 11:15) (72 - 78)  BP: 115/58 (07-20-23 @ 11:15) (107/57 - 127/60)  BP(mean): 80 (07-20-23 @ 10:55) (76 - 80)  RR: 18 (07-20-23 @ 10:55) (18 - 18)  SpO2: 93% (07-20-23 @ 11:15) (93% - 96%)    CVS: S1S2   Respiratory: No wheezing, no crepitations  GI: Abdomen soft, bowel sounds positive  Musculoskeletal:  moves all extremities  : Voiding

## 2023-07-20 NOTE — PROGRESS NOTE ADULT - PROBLEM SELECTOR PLAN 1
Will continue Lantus  40  u at bedtime.  Will continue Admelog 11 u before each meal and continue Admelog correction scale coverage. Will continue monitoring FS and FU.   Will continue monitoring FS, log, and glucose trends, will Follow up.  Insulin teaching  Patient counseled for compliance with consistent low carb diet and exercise as tolerated outpatient.    Plan to DC on home insulin

## 2023-07-21 ENCOUNTER — TRANSCRIPTION ENCOUNTER (OUTPATIENT)
Age: 63
End: 2023-07-21

## 2023-07-21 LAB
ALBUMIN SERPL ELPH-MCNC: 3.3 G/DL — SIGNIFICANT CHANGE UP (ref 3.3–5)
ALP SERPL-CCNC: 46 U/L — SIGNIFICANT CHANGE UP (ref 40–120)
ALT FLD-CCNC: 17 U/L — SIGNIFICANT CHANGE UP (ref 10–45)
ANION GAP SERPL CALC-SCNC: 13 MMOL/L — SIGNIFICANT CHANGE UP (ref 5–17)
AST SERPL-CCNC: 17 U/L — SIGNIFICANT CHANGE UP (ref 10–40)
BILIRUB SERPL-MCNC: 0.4 MG/DL — SIGNIFICANT CHANGE UP (ref 0.2–1.2)
BUN SERPL-MCNC: 13 MG/DL — SIGNIFICANT CHANGE UP (ref 7–23)
CALCIUM SERPL-MCNC: 8.7 MG/DL — SIGNIFICANT CHANGE UP (ref 8.4–10.5)
CHLORIDE SERPL-SCNC: 103 MMOL/L — SIGNIFICANT CHANGE UP (ref 96–108)
CO2 SERPL-SCNC: 23 MMOL/L — SIGNIFICANT CHANGE UP (ref 22–31)
CREAT SERPL-MCNC: 0.79 MG/DL — SIGNIFICANT CHANGE UP (ref 0.5–1.3)
EGFR: 100 ML/MIN/1.73M2 — SIGNIFICANT CHANGE UP
GLUCOSE BLDC GLUCOMTR-MCNC: 101 MG/DL — HIGH (ref 70–99)
GLUCOSE BLDC GLUCOMTR-MCNC: 108 MG/DL — HIGH (ref 70–99)
GLUCOSE BLDC GLUCOMTR-MCNC: 113 MG/DL — HIGH (ref 70–99)
GLUCOSE BLDC GLUCOMTR-MCNC: 115 MG/DL — HIGH (ref 70–99)
GLUCOSE BLDC GLUCOMTR-MCNC: 84 MG/DL — SIGNIFICANT CHANGE UP (ref 70–99)
GLUCOSE BLDC GLUCOMTR-MCNC: 90 MG/DL — SIGNIFICANT CHANGE UP (ref 70–99)
GLUCOSE SERPL-MCNC: 76 MG/DL — SIGNIFICANT CHANGE UP (ref 70–99)
HCT VFR BLD CALC: 28.5 % — LOW (ref 39–50)
HGB BLD-MCNC: 9.4 G/DL — LOW (ref 13–17)
MAGNESIUM SERPL-MCNC: 2.1 MG/DL — SIGNIFICANT CHANGE UP (ref 1.6–2.6)
MCHC RBC-ENTMCNC: 28.7 PG — SIGNIFICANT CHANGE UP (ref 27–34)
MCHC RBC-ENTMCNC: 33 GM/DL — SIGNIFICANT CHANGE UP (ref 32–36)
MCV RBC AUTO: 86.9 FL — SIGNIFICANT CHANGE UP (ref 80–100)
NRBC # BLD: 0 /100 WBCS — SIGNIFICANT CHANGE UP (ref 0–0)
PHOSPHATE SERPL-MCNC: 2.2 MG/DL — LOW (ref 2.5–4.5)
PLATELET # BLD AUTO: 190 K/UL — SIGNIFICANT CHANGE UP (ref 150–400)
POTASSIUM SERPL-MCNC: 3.5 MMOL/L — SIGNIFICANT CHANGE UP (ref 3.5–5.3)
POTASSIUM SERPL-SCNC: 3.5 MMOL/L — SIGNIFICANT CHANGE UP (ref 3.5–5.3)
PROT SERPL-MCNC: 5.8 G/DL — LOW (ref 6–8.3)
RBC # BLD: 3.28 M/UL — LOW (ref 4.2–5.8)
RBC # FLD: 13.3 % — SIGNIFICANT CHANGE UP (ref 10.3–14.5)
SODIUM SERPL-SCNC: 139 MMOL/L — SIGNIFICANT CHANGE UP (ref 135–145)
WBC # BLD: 9.02 K/UL — SIGNIFICANT CHANGE UP (ref 3.8–10.5)
WBC # FLD AUTO: 9.02 K/UL — SIGNIFICANT CHANGE UP (ref 3.8–10.5)

## 2023-07-21 RX ORDER — METOPROLOL TARTRATE 50 MG
25 TABLET ORAL ONCE
Refills: 0 | Status: COMPLETED | OUTPATIENT
Start: 2023-07-21 | End: 2023-07-21

## 2023-07-21 RX ORDER — METOPROLOL TARTRATE 50 MG
50 TABLET ORAL
Refills: 0 | Status: DISCONTINUED | OUTPATIENT
Start: 2023-07-21 | End: 2023-07-23

## 2023-07-21 RX ORDER — AMIODARONE HYDROCHLORIDE 400 MG/1
TABLET ORAL
Refills: 0 | Status: DISCONTINUED | OUTPATIENT
Start: 2023-07-21 | End: 2023-07-23

## 2023-07-21 RX ORDER — METOPROLOL TARTRATE 50 MG
5 TABLET ORAL ONCE
Refills: 0 | Status: COMPLETED | OUTPATIENT
Start: 2023-07-21 | End: 2023-07-21

## 2023-07-21 RX ORDER — POTASSIUM CHLORIDE 20 MEQ
20 PACKET (EA) ORAL ONCE
Refills: 0 | Status: COMPLETED | OUTPATIENT
Start: 2023-07-21 | End: 2023-07-21

## 2023-07-21 RX ORDER — AMIODARONE HYDROCHLORIDE 400 MG/1
200 TABLET ORAL DAILY
Refills: 0 | Status: CANCELLED | OUTPATIENT
Start: 2023-07-25 | End: 2023-07-23

## 2023-07-21 RX ORDER — AMIODARONE HYDROCHLORIDE 400 MG/1
400 TABLET ORAL EVERY 8 HOURS
Refills: 0 | Status: DISCONTINUED | OUTPATIENT
Start: 2023-07-21 | End: 2023-07-23

## 2023-07-21 RX ORDER — IRON SUCROSE 20 MG/ML
300 INJECTION, SOLUTION INTRAVENOUS EVERY 24 HOURS
Refills: 0 | Status: COMPLETED | OUTPATIENT
Start: 2023-07-21 | End: 2023-07-23

## 2023-07-21 RX ORDER — INSULIN GLARGINE 100 [IU]/ML
32 INJECTION, SOLUTION SUBCUTANEOUS AT BEDTIME
Refills: 0 | Status: DISCONTINUED | OUTPATIENT
Start: 2023-07-21 | End: 2023-07-22

## 2023-07-21 RX ADMIN — HEPARIN SODIUM 5000 UNIT(S): 5000 INJECTION INTRAVENOUS; SUBCUTANEOUS at 05:27

## 2023-07-21 RX ADMIN — ATORVASTATIN CALCIUM 40 MILLIGRAM(S): 80 TABLET, FILM COATED ORAL at 21:29

## 2023-07-21 RX ADMIN — HEPARIN SODIUM 5000 UNIT(S): 5000 INJECTION INTRAVENOUS; SUBCUTANEOUS at 21:30

## 2023-07-21 RX ADMIN — CHLORHEXIDINE GLUCONATE 1 APPLICATION(S): 213 SOLUTION TOPICAL at 13:09

## 2023-07-21 RX ADMIN — Medication 10 MILLIGRAM(S): at 13:25

## 2023-07-21 RX ADMIN — Medication 81 MILLIGRAM(S): at 12:28

## 2023-07-21 RX ADMIN — Medication 5 MILLIGRAM(S): at 05:43

## 2023-07-21 RX ADMIN — Medication 25 MILLIGRAM(S): at 05:53

## 2023-07-21 RX ADMIN — Medication 20 MILLIEQUIVALENT(S): at 12:27

## 2023-07-21 RX ADMIN — CLOPIDOGREL BISULFATE 75 MILLIGRAM(S): 75 TABLET, FILM COATED ORAL at 12:28

## 2023-07-21 RX ADMIN — Medication 11 UNIT(S): at 17:25

## 2023-07-21 RX ADMIN — INSULIN GLARGINE 32 UNIT(S): 100 INJECTION, SOLUTION SUBCUTANEOUS at 22:45

## 2023-07-21 RX ADMIN — GABAPENTIN 100 MILLIGRAM(S): 400 CAPSULE ORAL at 13:23

## 2023-07-21 RX ADMIN — Medication 500 MILLIGRAM(S): at 05:27

## 2023-07-21 RX ADMIN — AMIODARONE HYDROCHLORIDE 400 MILLIGRAM(S): 400 TABLET ORAL at 05:49

## 2023-07-21 RX ADMIN — Medication 50 MILLIGRAM(S): at 17:27

## 2023-07-21 RX ADMIN — Medication 11 UNIT(S): at 12:30

## 2023-07-21 RX ADMIN — HEPARIN SODIUM 5000 UNIT(S): 5000 INJECTION INTRAVENOUS; SUBCUTANEOUS at 13:23

## 2023-07-21 RX ADMIN — Medication 25 MILLIGRAM(S): at 05:27

## 2023-07-21 RX ADMIN — PANTOPRAZOLE SODIUM 40 MILLIGRAM(S): 20 TABLET, DELAYED RELEASE ORAL at 05:27

## 2023-07-21 RX ADMIN — GABAPENTIN 100 MILLIGRAM(S): 400 CAPSULE ORAL at 21:29

## 2023-07-21 RX ADMIN — IRON SUCROSE 176.67 MILLIGRAM(S): 20 INJECTION, SOLUTION INTRAVENOUS at 09:46

## 2023-07-21 RX ADMIN — Medication 11 UNIT(S): at 08:24

## 2023-07-21 RX ADMIN — AMIODARONE HYDROCHLORIDE 400 MILLIGRAM(S): 400 TABLET ORAL at 13:23

## 2023-07-21 RX ADMIN — GABAPENTIN 100 MILLIGRAM(S): 400 CAPSULE ORAL at 05:27

## 2023-07-21 RX ADMIN — AMIODARONE HYDROCHLORIDE 400 MILLIGRAM(S): 400 TABLET ORAL at 21:29

## 2023-07-21 RX ADMIN — Medication 500 MILLIGRAM(S): at 17:26

## 2023-07-21 NOTE — PROGRESS NOTE ADULT - ASSESSMENT
Assessment  DMT2: 63y Male with DM T2 with hyperglycemia, A1C 8.2%, was on oral meds at home, now s/p CABG, postop on IV insulin, extubated, diet being advanced, transitioned to basal bolus insulin. Glucose improving.   Will need tight glycemic control for postop wound healing.   CAD: on medications, stable, monitored.   HTN: on antihypertensive medications, monitored, asymptomatic.  HLD: on high intensity statin.     Discussed plan and management with Attending   Wesly Renee NP - TEAMS  Dr Elijah Bradford  [phone]

## 2023-07-21 NOTE — PROGRESS NOTE ADULT - PROBLEM SELECTOR PLAN 1
Asa, Statin, B-blocker,  50 q12    amio ld   restart,   home this weekend  if rhythm  stable  Ambulate   3x day  Glucose control, endo   following

## 2023-07-21 NOTE — PROGRESS NOTE ADULT - SUBJECTIVE AND OBJECTIVE BOX
Chief complaint  Patient is a 63y old  Male who presents with a chief complaint of sp  CABG (21 Jul 2023 11:58)         Labs and Fingersticks  CAPILLARY BLOOD GLUCOSE      POCT Blood Glucose.: 115 mg/dL (21 Jul 2023 12:22)  POCT Blood Glucose.: 113 mg/dL (21 Jul 2023 08:22)  POCT Blood Glucose.: 148 mg/dL (20 Jul 2023 21:29)  POCT Blood Glucose.: 158 mg/dL (20 Jul 2023 20:43)  POCT Blood Glucose.: 146 mg/dL (20 Jul 2023 19:31)  POCT Blood Glucose.: 118 mg/dL (20 Jul 2023 16:49)      Anion Gap: 13 (07-21 @ 05:59)  Anion Gap: 11 (07-20 @ 07:16)      Calcium: 8.7 (07-21 @ 05:59)  Calcium: 8.8 (07-20 @ 07:16)  Albumin: 3.3 (07-21 @ 05:59)  Albumin: 3.6 (07-20 @ 07:16)    Alanine Aminotransferase (ALT/SGPT): 17 (07-21 @ 05:59)  Alanine Aminotransferase (ALT/SGPT): 18 (07-20 @ 07:16)  Alkaline Phosphatase: 46 (07-21 @ 05:59)  Alkaline Phosphatase: 47 (07-20 @ 07:16)  Aspartate Aminotransferase (AST/SGOT): 17 (07-21 @ 05:59)  Aspartate Aminotransferase (AST/SGOT): 20 (07-20 @ 07:16)        07-21    139  |  103  |  13  ----------------------------<  76  3.5   |  23  |  0.79    Ca    8.7      21 Jul 2023 05:59  Phos  2.2     07-21  Mg     2.1     07-21    TPro  5.8<L>  /  Alb  3.3  /  TBili  0.4  /  DBili  x   /  AST  17  /  ALT  17  /  AlkPhos  46  07-21                        9.4    9.02  )-----------( 190      ( 21 Jul 2023 05:59 )             28.5     Medications  MEDICATIONS  (STANDING):  aMIOdarone    Tablet 400 milliGRAM(s) Oral every 8 hours  aMIOdarone    Tablet   Oral   ascorbic acid 500 milliGRAM(s) Oral two times a day  aspirin enteric coated 81 milliGRAM(s) Oral daily  atorvastatin 40 milliGRAM(s) Oral at bedtime  bisacodyl Suppository 10 milliGRAM(s) Rectal once  chlorhexidine 2% Cloths 1 Application(s) Topical daily  clopidogrel Tablet 75 milliGRAM(s) Oral daily  dextrose 50% Injectable 50 milliLiter(s) IV Push every 15 minutes  dextrose 50% Injectable 25 milliLiter(s) IV Push every 15 minutes  dextrose Oral Gel 15 Gram(s) Oral once  gabapentin 100 milliGRAM(s) Oral every 8 hours  glucagon  Injectable 1 milliGRAM(s) IntraMuscular once  heparin   Injectable 5000 Unit(s) SubCutaneous every 8 hours  insulin glargine Injectable (LANTUS) 32 Unit(s) SubCutaneous at bedtime  insulin lispro (ADMELOG) corrective regimen sliding scale   SubCutaneous three times a day before meals  insulin lispro (ADMELOG) corrective regimen sliding scale   SubCutaneous at bedtime  insulin lispro Injectable (ADMELOG) 11 Unit(s) SubCutaneous three times a day before meals  insulin regular Infusion 3 Unit(s)/Hr (3 mL/Hr) IV Continuous <Continuous>  iron sucrose IVPB 300 milliGRAM(s) IV Intermittent every 24 hours  metoclopramide Injectable 10 milliGRAM(s) IV Push every 8 hours  metoprolol tartrate 50 milliGRAM(s) Oral two times a day  pantoprazole    Tablet 40 milliGRAM(s) Oral before breakfast  polyethylene glycol 3350 17 Gram(s) Oral daily  senna 2 Tablet(s) Oral at bedtime  sodium chloride 0.9%. 1000 milliLiter(s) (10 mL/Hr) IV Continuous <Continuous>      Physical Exam  General: Patient comfortable in bed  Vital Signs Last 12 Hrs  T(F): 98.4 (07-21-23 @ 12:25), Max: 98.4 (07-21-23 @ 12:25)  HR: 76 (07-21-23 @ 12:25) (76 - 130)  BP: 124/73 (07-21-23 @ 12:25) (124/62 - 139/85)  BP(mean): 90 (07-21-23 @ 12:25) (83 - 90)  RR: 18 (07-21-23 @ 12:25) (18 - 18)  SpO2: 94% (07-21-23 @ 12:25) (91% - 94%)    CVS: S1S2   Respiratory: No wheezing, no crepitations  GI: Abdomen soft, bowel sounds positive  Musculoskeletal:  moves all extremities  : Voiding

## 2023-07-21 NOTE — PROGRESS NOTE ADULT - ASSESSMENT
Patient is 63 year old male with PMHx HTN, T2DM on metformin who presents to [facility] ED after +stress test 7/11 at 2pm. Patient afterwards ~@1530 began complaining of severe chest pressure with radiation to L arm/shoulder and generalized weakness. Pt states he had similar chest pain ~1 month ago but notes chest pain at that time resolved. Patient admits to extensive family history of CAD. Patient denies SOB, palpitations, history of CAD, HF, or recent illness. Patient follows with cardiologist Dr. Posada.  Patient received Brilinta 180 mg x1 in Dr. Posada's office,   ED course- cardiology was called initially for STEMI alert. Upon review of EKG, pt does not meet STEMI criteria- , A fib RVR, LEON in aVR with ST depressions leads II, III, aVF, V3-V6. Patient with continued chest pressure. Pt s/p load with ASA/Heparin as per ACS protocol, 2.5 mg IVP Lopressor x1 and 25 mg PO Lopressor for rate control. Troponin 48.  7/12 patient had Cardiac Cath which revealed 3VCAD. Now for Tx to [facility] for Cardiac Surgery Evaluation.  7/17/23   C3-L  LIMA-LAD | SVG-OM | SVG-PDA  ef 40  Insulin infusion. epi for labile bp  Endo for glucose control  Beta blockers added  7/19 tx sdu  7/21     vss restart amio load   for afib,   lop to 50 q12      PW  on EPM

## 2023-07-21 NOTE — DISCHARGE NOTE NURSING/CASE MANAGEMENT/SOCIAL WORK - PATIENT PORTAL LINK FT
You can access the FollowMyHealth Patient Portal offered by [facility] by registering at the following website: [email or web address]. By joining [employer]’s FollowMyHealth portal, you will also be able to view your health information using other applications (apps) compatible with our system.

## 2023-07-21 NOTE — PROGRESS NOTE ADULT - PROBLEM SELECTOR PLAN 1
Will decrease Lantus  32 u at bedtime.  Will continue Admelog 11 u before each meal and continue Admelog correction scale coverage. Will continue monitoring FS and FU.   Will continue monitoring FS, log, and glucose trends, will Follow up.  Insulin teaching  Patient counseled for compliance with consistent low carb diet and exercise as tolerated outpatient.    Plan to DC on home insulin basal bolus. may get DC on current doses if glucose remains stable  FU outpatient

## 2023-07-21 NOTE — DISCHARGE NOTE NURSING/CASE MANAGEMENT/SOCIAL WORK - NSDCPEFALRISK_GEN_ALL_CORE
For information on Fall & Injury Prevention, visit: [email or web address] OR  [email or web address] OR  https://www.cdc.gov/steadi/patient.html

## 2023-07-21 NOTE — PROGRESS NOTE ADULT - SUBJECTIVE AND OBJECTIVE BOX
VITAL SIGNS    Telemetry:    sr  80    Vital Signs Last 24 Hrs  T(C): 36.8 (07-21-23 @ 05:24), Max: 37.1 (07-20-23 @ 19:30)  T(F): 98.3 (07-21-23 @ 05:24), Max: 98.7 (07-20-23 @ 19:30)  HR: 100 (07-21-23 @ 06:53) (74 - 130)  BP: 125/85 (07-21-23 @ 06:53) (124/62 - 148/78)  RR: 18 (07-21-23 @ 05:24) (18 - 18)  SpO2: 93% (07-21-23 @ 05:30) (91% - 93%)                   Daily     Daily       Bilirubin Total: 0.4 mg/dL (07-21 @ 05:59)    CAPILLARY BLOOD GLUCOSE      POCT Blood Glucose.: 113 mg/dL (21 Jul 2023 08:22)  POCT Blood Glucose.: 148 mg/dL (20 Jul 2023 21:29)  POCT Blood Glucose.: 158 mg/dL (20 Jul 2023 20:43)  POCT Blood Glucose.: 146 mg/dL (20 Jul 2023 19:31)  POCT Blood Glucose.: 118 mg/dL (20 Jul 2023 16:49)                        PHYSICAL EXAM    Neurology: alert and oriented x 3, moves all extremities with no defecits  CV :  RRR  Sternal Wound   CDI  Lungs:   CTA B/L  Abdomen: soft, nontender, nondistended, positive bowel sounds, last bowel movement 7/20  Extremities:     trace pedal edema

## 2023-07-22 LAB
ANION GAP SERPL CALC-SCNC: 10 MMOL/L — SIGNIFICANT CHANGE UP (ref 5–17)
APTT BLD: 27.5 SEC — SIGNIFICANT CHANGE UP (ref 27.5–35.5)
BUN SERPL-MCNC: 12 MG/DL — SIGNIFICANT CHANGE UP (ref 7–23)
CALCIUM SERPL-MCNC: 8.7 MG/DL — SIGNIFICANT CHANGE UP (ref 8.4–10.5)
CHLORIDE SERPL-SCNC: 103 MMOL/L — SIGNIFICANT CHANGE UP (ref 96–108)
CO2 SERPL-SCNC: 23 MMOL/L — SIGNIFICANT CHANGE UP (ref 22–31)
CREAT SERPL-MCNC: 0.8 MG/DL — SIGNIFICANT CHANGE UP (ref 0.5–1.3)
EGFR: 99 ML/MIN/1.73M2 — SIGNIFICANT CHANGE UP
GLUCOSE BLDC GLUCOMTR-MCNC: 100 MG/DL — HIGH (ref 70–99)
GLUCOSE BLDC GLUCOMTR-MCNC: 106 MG/DL — HIGH (ref 70–99)
GLUCOSE BLDC GLUCOMTR-MCNC: 110 MG/DL — HIGH (ref 70–99)
GLUCOSE BLDC GLUCOMTR-MCNC: 139 MG/DL — HIGH (ref 70–99)
GLUCOSE BLDC GLUCOMTR-MCNC: 177 MG/DL — HIGH (ref 70–99)
GLUCOSE BLDC GLUCOMTR-MCNC: 79 MG/DL — SIGNIFICANT CHANGE UP (ref 70–99)
GLUCOSE SERPL-MCNC: 107 MG/DL — HIGH (ref 70–99)
HCT VFR BLD CALC: 29.8 % — LOW (ref 39–50)
HGB BLD-MCNC: 9.8 G/DL — LOW (ref 13–17)
INR BLD: 1.18 RATIO — HIGH (ref 0.88–1.16)
MAGNESIUM SERPL-MCNC: 2.1 MG/DL — SIGNIFICANT CHANGE UP (ref 1.6–2.6)
MCHC RBC-ENTMCNC: 28.3 PG — SIGNIFICANT CHANGE UP (ref 27–34)
MCHC RBC-ENTMCNC: 32.9 GM/DL — SIGNIFICANT CHANGE UP (ref 32–36)
MCV RBC AUTO: 86.1 FL — SIGNIFICANT CHANGE UP (ref 80–100)
NRBC # BLD: 0 /100 WBCS — SIGNIFICANT CHANGE UP (ref 0–0)
PHOSPHATE SERPL-MCNC: 2.6 MG/DL — SIGNIFICANT CHANGE UP (ref 2.5–4.5)
PLATELET # BLD AUTO: 236 K/UL — SIGNIFICANT CHANGE UP (ref 150–400)
POTASSIUM SERPL-MCNC: 3.4 MMOL/L — LOW (ref 3.5–5.3)
POTASSIUM SERPL-MCNC: 4.4 MMOL/L — SIGNIFICANT CHANGE UP (ref 3.5–5.3)
POTASSIUM SERPL-SCNC: 3.4 MMOL/L — LOW (ref 3.5–5.3)
POTASSIUM SERPL-SCNC: 4.4 MMOL/L — SIGNIFICANT CHANGE UP (ref 3.5–5.3)
PROTHROM AB SERPL-ACNC: 13.7 SEC — HIGH (ref 10.5–13.4)
RBC # BLD: 3.46 M/UL — LOW (ref 4.2–5.8)
RBC # FLD: 13.3 % — SIGNIFICANT CHANGE UP (ref 10.3–14.5)
SODIUM SERPL-SCNC: 136 MMOL/L — SIGNIFICANT CHANGE UP (ref 135–145)
WBC # BLD: 8.03 K/UL — SIGNIFICANT CHANGE UP (ref 3.8–10.5)
WBC # FLD AUTO: 8.03 K/UL — SIGNIFICANT CHANGE UP (ref 3.8–10.5)

## 2023-07-22 RX ORDER — INSULIN GLARGINE 100 [IU]/ML
28 INJECTION, SOLUTION SUBCUTANEOUS AT BEDTIME
Refills: 0 | Status: DISCONTINUED | OUTPATIENT
Start: 2023-07-22 | End: 2023-07-23

## 2023-07-22 RX ORDER — POTASSIUM CHLORIDE 20 MEQ
20 PACKET (EA) ORAL
Refills: 0 | Status: COMPLETED | OUTPATIENT
Start: 2023-07-22 | End: 2023-07-22

## 2023-07-22 RX ORDER — INSULIN LISPRO 100/ML
9 VIAL (ML) SUBCUTANEOUS
Refills: 0 | Status: DISCONTINUED | OUTPATIENT
Start: 2023-07-22 | End: 2023-07-23

## 2023-07-22 RX ADMIN — ATORVASTATIN CALCIUM 40 MILLIGRAM(S): 80 TABLET, FILM COATED ORAL at 22:20

## 2023-07-22 RX ADMIN — AMIODARONE HYDROCHLORIDE 400 MILLIGRAM(S): 400 TABLET ORAL at 22:19

## 2023-07-22 RX ADMIN — INSULIN GLARGINE 28 UNIT(S): 100 INJECTION, SOLUTION SUBCUTANEOUS at 22:19

## 2023-07-22 RX ADMIN — SENNA PLUS 2 TABLET(S): 8.6 TABLET ORAL at 22:19

## 2023-07-22 RX ADMIN — Medication 9 UNIT(S): at 12:08

## 2023-07-22 RX ADMIN — PANTOPRAZOLE SODIUM 40 MILLIGRAM(S): 20 TABLET, DELAYED RELEASE ORAL at 06:13

## 2023-07-22 RX ADMIN — HEPARIN SODIUM 5000 UNIT(S): 5000 INJECTION INTRAVENOUS; SUBCUTANEOUS at 06:13

## 2023-07-22 RX ADMIN — Medication 9 UNIT(S): at 18:54

## 2023-07-22 RX ADMIN — Medication 9 UNIT(S): at 08:38

## 2023-07-22 RX ADMIN — Medication 20 MILLIEQUIVALENT(S): at 08:38

## 2023-07-22 RX ADMIN — Medication 81 MILLIGRAM(S): at 10:39

## 2023-07-22 RX ADMIN — Medication 500 MILLIGRAM(S): at 06:12

## 2023-07-22 RX ADMIN — AMIODARONE HYDROCHLORIDE 400 MILLIGRAM(S): 400 TABLET ORAL at 06:12

## 2023-07-22 RX ADMIN — Medication 10 MILLIGRAM(S): at 22:19

## 2023-07-22 RX ADMIN — CHLORHEXIDINE GLUCONATE 1 APPLICATION(S): 213 SOLUTION TOPICAL at 10:53

## 2023-07-22 RX ADMIN — Medication 20 MILLIEQUIVALENT(S): at 10:39

## 2023-07-22 RX ADMIN — IRON SUCROSE 176.67 MILLIGRAM(S): 20 INJECTION, SOLUTION INTRAVENOUS at 06:17

## 2023-07-22 RX ADMIN — AMIODARONE HYDROCHLORIDE 400 MILLIGRAM(S): 400 TABLET ORAL at 13:04

## 2023-07-22 RX ADMIN — GABAPENTIN 100 MILLIGRAM(S): 400 CAPSULE ORAL at 06:12

## 2023-07-22 RX ADMIN — Medication 50 MILLIGRAM(S): at 17:44

## 2023-07-22 RX ADMIN — GABAPENTIN 100 MILLIGRAM(S): 400 CAPSULE ORAL at 14:19

## 2023-07-22 RX ADMIN — HEPARIN SODIUM 5000 UNIT(S): 5000 INJECTION INTRAVENOUS; SUBCUTANEOUS at 22:19

## 2023-07-22 RX ADMIN — CLOPIDOGREL BISULFATE 75 MILLIGRAM(S): 75 TABLET, FILM COATED ORAL at 10:39

## 2023-07-22 RX ADMIN — Medication 50 MILLIGRAM(S): at 06:17

## 2023-07-22 RX ADMIN — HEPARIN SODIUM 5000 UNIT(S): 5000 INJECTION INTRAVENOUS; SUBCUTANEOUS at 13:03

## 2023-07-22 NOTE — PROGRESS NOTE ADULT - PROBLEM SELECTOR PLAN 1
Will decrease Lantus  28 u at bedtime.  Will continue 9 u before each meal and continue Admelog correction scale coverage. Will continue monitoring FS and FU.   Will continue monitoring FS, log, and glucose trends, will Follow up.  Insulin teaching requested  Patient counseled for compliance with consistent low carb diet and exercise as tolerated outpatient.    Plan to DC on home insulin basal bolus. may get DC on current doses if glucose remains stable  FU outpatient

## 2023-07-22 NOTE — PROGRESS NOTE ADULT - ASSESSMENT
Assessment  DMT2: 63y Male with DM T2 with hyperglycemia, A1C 8.2%, was on oral meds at home, now s/p CABG, postop on IV insulin, extubated, diet being advanced, transitioned to basal bolus insulin. Glucose down trending, within target goal.   Will need tight glycemic control for postop wound healing.   CAD: on medications, stable, monitored.   HTN: on antihypertensive medications, monitored, asymptomatic.  HLD: on high intensity statin.     Discussed plan and management with Attending   Wesly Renee NP - TEAMS  Dr Elijah Bradford  [phone]

## 2023-07-22 NOTE — PROGRESS NOTE ADULT - PROBLEM SELECTOR PLAN 1
Asa, Statin, B-blocker,  50 q12    amio ld   restart,   7/21    home   am  if rhythm  stable  Ambulate   3x day  Glucose control, endo   following

## 2023-07-22 NOTE — PROGRESS NOTE ADULT - ASSESSMENT
Patient is 63 year old male with PMHx HTN, T2DM on metformin who presents to [facility] ED after +stress test 7/11 at 2pm. Patient afterwards ~@1530 began complaining of severe chest pressure with radiation to L arm/shoulder and generalized weakness. Pt states he had similar chest pain ~1 month ago but notes chest pain at that time resolved. Patient admits to extensive family history of CAD. Patient denies SOB, palpitations, history of CAD, HF, or recent illness. Patient follows with cardiologist Dr. Posada.  Patient received Brilinta 180 mg x1 in Dr. Posada's office,   ED course- cardiology was called initially for STEMI alert. Upon review of EKG, pt does not meet STEMI criteria- , A fib RVR, LEON in aVR with ST depressions leads II, III, aVF, V3-V6. Patient with continued chest pressure. Pt s/p load with ASA/Heparin as per ACS protocol, 2.5 mg IVP Lopressor x1 and 25 mg PO Lopressor for rate control. Troponin 48.  7/12 patient had Cardiac Cath which revealed 3VCAD. Now for Tx to [facility] for Cardiac Surgery Evaluation.  7/17/23   C3-L  LIMA-LAD | SVG-OM | SVG-PDA  ef 40  Insulin infusion. epi for labile bp  Endo for glucose control  Beta blockers added  7/19 tx sdu  7/21     vss restart amio load   for afib,   lop to 50 q12      PW  on EPM  7/22   dc pw     NSR      amio ld  and lop 50 q12

## 2023-07-22 NOTE — PROGRESS NOTE ADULT - SUBJECTIVE AND OBJECTIVE BOX
VITAL SIGNS    Telemetry:    sr  78    Vital Signs Last 24 Hrs  T(C): 36.7 (23 @ 12:29), Max: 37.6 (23 @ 04:33)  T(F): 98.1 (23 @ 12:29), Max: 99.7 (23 @ 04:33)  HR: 74 (23 @ 12:29) (69 - 79)  BP: 130/77 (23 @ 12:29) (124/66 - 139/77)  RR: 18 (23 @ 12:29) (18 - 18)  SpO2: 95% (23 @ 12:29) (93% - 95%)                   Daily     Daily Weight in k.1 (2023 07:27)        CAPILLARY BLOOD GLUCOSE      POCT Blood Glucose.: 139 mg/dL (2023 11:45)  POCT Blood Glucose.: 106 mg/dL (2023 08:04)  POCT Blood Glucose.: 79 mg/dL (2023 03:20)  POCT Blood Glucose.: 108 mg/dL (2023 22:43)  POCT Blood Glucose.: 84 mg/dL (2023 22:07)  POCT Blood Glucose.: 90 mg/dL (2023 20:52)  POCT Blood Glucose.: 101 mg/dL (2023 16:32)            Pacing Wires out                    PHYSICAL EXAM    Neurology: alert and oriented x 3, moves all extremities with no defecits  CV :  RRR  Sternal Wound :  CDI , Stable  Lungs:   CTA B/L  Abdomen: soft, nontender, nondistended, positive bowel sounds,  Extremities:       no edema

## 2023-07-22 NOTE — PROGRESS NOTE ADULT - SUBJECTIVE AND OBJECTIVE BOX
Chief complaint  Patient is a 63y old  Male who presents with a chief complaint of CAD (21 Jul 2023 12:57)         Labs and Fingersticks  CAPILLARY BLOOD GLUCOSE      POCT Blood Glucose.: 106 mg/dL (22 Jul 2023 08:04)  POCT Blood Glucose.: 79 mg/dL (22 Jul 2023 03:20)  POCT Blood Glucose.: 108 mg/dL (21 Jul 2023 22:43)  POCT Blood Glucose.: 84 mg/dL (21 Jul 2023 22:07)  POCT Blood Glucose.: 90 mg/dL (21 Jul 2023 20:52)  POCT Blood Glucose.: 101 mg/dL (21 Jul 2023 16:32)  POCT Blood Glucose.: 115 mg/dL (21 Jul 2023 12:22)      Anion Gap: 10 (07-22 @ 05:02)  Anion Gap: 13 (07-21 @ 05:59)      Calcium: 8.7 (07-22 @ 05:02)  Calcium: 8.7 (07-21 @ 05:59)  Albumin: 3.3 (07-21 @ 05:59)    Alanine Aminotransferase (ALT/SGPT): 17 (07-21 @ 05:59)  Alkaline Phosphatase: 46 (07-21 @ 05:59)  Aspartate Aminotransferase (AST/SGOT): 17 (07-21 @ 05:59)        07-22    136  |  103  |  12  ----------------------------<  107<H>  3.4<L>   |  23  |  0.80    Ca    8.7      22 Jul 2023 05:02  Phos  2.6     07-22  Mg     2.1     07-22    TPro  5.8<L>  /  Alb  3.3  /  TBili  0.4  /  DBili  x   /  AST  17  /  ALT  17  /  AlkPhos  46  07-21                        9.8    8.03  )-----------( 236      ( 22 Jul 2023 05:02 )             29.8     Medications  MEDICATIONS  (STANDING):  aMIOdarone    Tablet 400 milliGRAM(s) Oral every 8 hours  aMIOdarone    Tablet   Oral   aspirin enteric coated 81 milliGRAM(s) Oral daily  atorvastatin 40 milliGRAM(s) Oral at bedtime  bisacodyl Suppository 10 milliGRAM(s) Rectal once  chlorhexidine 2% Cloths 1 Application(s) Topical daily  clopidogrel Tablet 75 milliGRAM(s) Oral daily  dextrose 50% Injectable 50 milliLiter(s) IV Push every 15 minutes  dextrose 50% Injectable 25 milliLiter(s) IV Push every 15 minutes  dextrose Oral Gel 15 Gram(s) Oral once  gabapentin 100 milliGRAM(s) Oral every 8 hours  glucagon  Injectable 1 milliGRAM(s) IntraMuscular once  heparin   Injectable 5000 Unit(s) SubCutaneous every 8 hours  insulin glargine Injectable (LANTUS) 28 Unit(s) SubCutaneous at bedtime  insulin lispro (ADMELOG) corrective regimen sliding scale   SubCutaneous three times a day before meals  insulin lispro (ADMELOG) corrective regimen sliding scale   SubCutaneous at bedtime  insulin lispro Injectable (ADMELOG) 9 Unit(s) SubCutaneous three times a day before meals  insulin regular Infusion 3 Unit(s)/Hr (3 mL/Hr) IV Continuous <Continuous>  iron sucrose IVPB 300 milliGRAM(s) IV Intermittent every 24 hours  metoclopramide Injectable 10 milliGRAM(s) IV Push every 8 hours  metoprolol tartrate 50 milliGRAM(s) Oral two times a day  pantoprazole    Tablet 40 milliGRAM(s) Oral before breakfast  polyethylene glycol 3350 17 Gram(s) Oral daily  potassium chloride    Tablet ER 20 milliEquivalent(s) Oral every 2 hours  senna 2 Tablet(s) Oral at bedtime  sodium chloride 0.9%. 1000 milliLiter(s) (10 mL/Hr) IV Continuous <Continuous>      Physical Exam  General: Patient comfortable in bed   Vital Signs Last 12 Hrs  T(F): 99.7 (07-22-23 @ 04:33), Max: 99.7 (07-22-23 @ 04:33)  HR: 77 (07-22-23 @ 04:33) (77 - 77)  BP: 124/66 (07-22-23 @ 04:33) (124/66 - 124/66)  BP(mean): --  RR: 18 (07-22-23 @ 04:33) (18 - 18)  SpO2: 93% (07-22-23 @ 04:33) (93% - 93%)    CVS: S1S2   Respiratory: No wheezing, no crepitations  GI: Abdomen soft, bowel sounds positive  Musculoskeletal:  moves all extremities  : Voiding

## 2023-07-23 ENCOUNTER — TRANSCRIPTION ENCOUNTER (OUTPATIENT)
Age: 63
End: 2023-07-23

## 2023-07-23 VITALS — WEIGHT: 184.75 LBS

## 2023-07-23 LAB
ANION GAP SERPL CALC-SCNC: 12 MMOL/L — SIGNIFICANT CHANGE UP (ref 5–17)
BUN SERPL-MCNC: 13 MG/DL — SIGNIFICANT CHANGE UP (ref 7–23)
CALCIUM SERPL-MCNC: 9.2 MG/DL — SIGNIFICANT CHANGE UP (ref 8.4–10.5)
CHLORIDE SERPL-SCNC: 103 MMOL/L — SIGNIFICANT CHANGE UP (ref 96–108)
CO2 SERPL-SCNC: 20 MMOL/L — LOW (ref 22–31)
CREAT SERPL-MCNC: 0.89 MG/DL — SIGNIFICANT CHANGE UP (ref 0.5–1.3)
EGFR: 96 ML/MIN/1.73M2 — SIGNIFICANT CHANGE UP
GLUCOSE BLDC GLUCOMTR-MCNC: 80 MG/DL — SIGNIFICANT CHANGE UP (ref 70–99)
GLUCOSE BLDC GLUCOMTR-MCNC: 82 MG/DL — SIGNIFICANT CHANGE UP (ref 70–99)
GLUCOSE SERPL-MCNC: 146 MG/DL — HIGH (ref 70–99)
HCT VFR BLD CALC: 32.4 % — LOW (ref 39–50)
HGB BLD-MCNC: 10.4 G/DL — LOW (ref 13–17)
MCHC RBC-ENTMCNC: 27.9 PG — SIGNIFICANT CHANGE UP (ref 27–34)
MCHC RBC-ENTMCNC: 32.1 GM/DL — SIGNIFICANT CHANGE UP (ref 32–36)
MCV RBC AUTO: 86.9 FL — SIGNIFICANT CHANGE UP (ref 80–100)
NRBC # BLD: 0 /100 WBCS — SIGNIFICANT CHANGE UP (ref 0–0)
PLATELET # BLD AUTO: 313 K/UL — SIGNIFICANT CHANGE UP (ref 150–400)
POTASSIUM SERPL-MCNC: 4 MMOL/L — SIGNIFICANT CHANGE UP (ref 3.5–5.3)
POTASSIUM SERPL-SCNC: 4 MMOL/L — SIGNIFICANT CHANGE UP (ref 3.5–5.3)
RBC # BLD: 3.73 M/UL — LOW (ref 4.2–5.8)
RBC # FLD: 13.5 % — SIGNIFICANT CHANGE UP (ref 10.3–14.5)
SODIUM SERPL-SCNC: 135 MMOL/L — SIGNIFICANT CHANGE UP (ref 135–145)
WBC # BLD: 9.24 K/UL — SIGNIFICANT CHANGE UP (ref 3.8–10.5)
WBC # FLD AUTO: 9.24 K/UL — SIGNIFICANT CHANGE UP (ref 3.8–10.5)

## 2023-07-23 PROCEDURE — 84134 ASSAY OF PREALBUMIN: CPT

## 2023-07-23 PROCEDURE — 97110 THERAPEUTIC EXERCISES: CPT

## 2023-07-23 PROCEDURE — 94002 VENT MGMT INPAT INIT DAY: CPT

## 2023-07-23 PROCEDURE — 86850 RBC ANTIBODY SCREEN: CPT

## 2023-07-23 PROCEDURE — 84100 ASSAY OF PHOSPHORUS: CPT

## 2023-07-23 PROCEDURE — 82565 ASSAY OF CREATININE: CPT

## 2023-07-23 PROCEDURE — 94010 BREATHING CAPACITY TEST: CPT

## 2023-07-23 PROCEDURE — 93005 ELECTROCARDIOGRAM TRACING: CPT

## 2023-07-23 PROCEDURE — 97162 PT EVAL MOD COMPLEX 30 MIN: CPT

## 2023-07-23 PROCEDURE — 85730 THROMBOPLASTIN TIME PARTIAL: CPT

## 2023-07-23 PROCEDURE — 85018 HEMOGLOBIN: CPT

## 2023-07-23 PROCEDURE — 85025 COMPLETE CBC W/AUTO DIFF WBC: CPT

## 2023-07-23 PROCEDURE — 84295 ASSAY OF SERUM SODIUM: CPT

## 2023-07-23 PROCEDURE — 85520 HEPARIN ASSAY: CPT

## 2023-07-23 PROCEDURE — 84132 ASSAY OF SERUM POTASSIUM: CPT

## 2023-07-23 PROCEDURE — 85027 COMPLETE CBC AUTOMATED: CPT

## 2023-07-23 PROCEDURE — 86891 AUTOLOGOUS BLOOD OP SALVAGE: CPT

## 2023-07-23 PROCEDURE — 80048 BASIC METABOLIC PNL TOTAL CA: CPT

## 2023-07-23 PROCEDURE — 84484 ASSAY OF TROPONIN QUANT: CPT

## 2023-07-23 PROCEDURE — 71046 X-RAY EXAM CHEST 2 VIEWS: CPT | Mod: 26

## 2023-07-23 PROCEDURE — 82550 ASSAY OF CK (CPK): CPT

## 2023-07-23 PROCEDURE — 83735 ASSAY OF MAGNESIUM: CPT

## 2023-07-23 PROCEDURE — 85014 HEMATOCRIT: CPT

## 2023-07-23 PROCEDURE — 80053 COMPREHEN METABOLIC PANEL: CPT

## 2023-07-23 PROCEDURE — 82803 BLOOD GASES ANY COMBINATION: CPT

## 2023-07-23 PROCEDURE — 84439 ASSAY OF FREE THYROXINE: CPT

## 2023-07-23 PROCEDURE — 93880 EXTRACRANIAL BILAT STUDY: CPT

## 2023-07-23 PROCEDURE — C1889: CPT

## 2023-07-23 PROCEDURE — 83605 ASSAY OF LACTIC ACID: CPT

## 2023-07-23 PROCEDURE — 82330 ASSAY OF CALCIUM: CPT

## 2023-07-23 PROCEDURE — 71045 X-RAY EXAM CHEST 1 VIEW: CPT

## 2023-07-23 PROCEDURE — 85576 BLOOD PLATELET AGGREGATION: CPT

## 2023-07-23 PROCEDURE — 97116 GAIT TRAINING THERAPY: CPT

## 2023-07-23 PROCEDURE — 86901 BLOOD TYPING SEROLOGIC RH(D): CPT

## 2023-07-23 PROCEDURE — 97165 OT EVAL LOW COMPLEX 30 MIN: CPT

## 2023-07-23 PROCEDURE — 82553 CREATINE MB FRACTION: CPT

## 2023-07-23 PROCEDURE — 82947 ASSAY GLUCOSE BLOOD QUANT: CPT

## 2023-07-23 PROCEDURE — 94660 CPAP INITIATION&MGMT: CPT

## 2023-07-23 PROCEDURE — C1751: CPT

## 2023-07-23 PROCEDURE — 81001 URINALYSIS AUTO W/SCOPE: CPT

## 2023-07-23 PROCEDURE — 82435 ASSAY OF BLOOD CHLORIDE: CPT

## 2023-07-23 PROCEDURE — 71046 X-RAY EXAM CHEST 2 VIEWS: CPT

## 2023-07-23 PROCEDURE — P9045: CPT

## 2023-07-23 PROCEDURE — 87641 MR-STAPH DNA AMP PROBE: CPT

## 2023-07-23 PROCEDURE — 86923 COMPATIBILITY TEST ELECTRIC: CPT

## 2023-07-23 PROCEDURE — 85384 FIBRINOGEN ACTIVITY: CPT

## 2023-07-23 PROCEDURE — 83880 ASSAY OF NATRIURETIC PEPTIDE: CPT

## 2023-07-23 PROCEDURE — 86900 BLOOD TYPING SEROLOGIC ABO: CPT

## 2023-07-23 PROCEDURE — 93010 ELECTROCARDIOGRAM REPORT: CPT

## 2023-07-23 PROCEDURE — 85610 PROTHROMBIN TIME: CPT

## 2023-07-23 PROCEDURE — 82962 GLUCOSE BLOOD TEST: CPT

## 2023-07-23 PROCEDURE — C1769: CPT

## 2023-07-23 PROCEDURE — 87640 STAPH A DNA AMP PROBE: CPT

## 2023-07-23 PROCEDURE — C9399: CPT

## 2023-07-23 PROCEDURE — 36415 COLL VENOUS BLD VENIPUNCTURE: CPT

## 2023-07-23 RX ORDER — METOPROLOL TARTRATE 50 MG
1 TABLET ORAL
Qty: 60 | Refills: 0
Start: 2023-07-23 | End: 2023-08-21

## 2023-07-23 RX ORDER — AMLODIPINE BESYLATE AND OLMESARTRAN MEDOXOMIL 10; 40 MG/1; MG/1
1 TABLET, FILM COATED ORAL
Refills: 0 | DISCHARGE

## 2023-07-23 RX ORDER — SENNA PLUS 8.6 MG/1
2 TABLET ORAL
Qty: 0 | Refills: 0 | DISCHARGE
Start: 2023-07-23

## 2023-07-23 RX ORDER — INSULIN GLARGINE 100 [IU]/ML
20 INJECTION, SOLUTION SUBCUTANEOUS AT BEDTIME
Refills: 0 | Status: DISCONTINUED | OUTPATIENT
Start: 2023-07-23 | End: 2023-07-23

## 2023-07-23 RX ORDER — INSULIN LISPRO 100/ML
6 VIAL (ML) SUBCUTANEOUS
Refills: 0 | Status: DISCONTINUED | OUTPATIENT
Start: 2023-07-23 | End: 2023-07-23

## 2023-07-23 RX ORDER — SITAGLIPTIN AND METFORMIN HYDROCHLORIDE 500; 50 MG/1; MG/1
1 TABLET, FILM COATED ORAL
Qty: 60 | Refills: 0
Start: 2023-07-23 | End: 2023-08-21

## 2023-07-23 RX ORDER — OXYCODONE HYDROCHLORIDE 5 MG/1
1 TABLET ORAL
Qty: 0 | Refills: 0 | DISCHARGE
Start: 2023-07-23

## 2023-07-23 RX ORDER — ASPIRIN/CALCIUM CARB/MAGNESIUM 324 MG
1 TABLET ORAL
Qty: 0 | Refills: 0 | DISCHARGE
Start: 2023-07-23

## 2023-07-23 RX ORDER — POTASSIUM CHLORIDE 20 MEQ
20 PACKET (EA) ORAL ONCE
Refills: 0 | Status: COMPLETED | OUTPATIENT
Start: 2023-07-23 | End: 2023-07-23

## 2023-07-23 RX ORDER — OXYCODONE HYDROCHLORIDE 5 MG/1
1 TABLET ORAL
Qty: 20 | Refills: 0
Start: 2023-07-23 | End: 2023-07-27

## 2023-07-23 RX ORDER — METFORMIN HYDROCHLORIDE 850 MG/1
1 TABLET ORAL
Refills: 0 | DISCHARGE

## 2023-07-23 RX ORDER — ACETAMINOPHEN 500 MG
2 TABLET ORAL
Qty: 0 | Refills: 0 | DISCHARGE
Start: 2023-07-23

## 2023-07-23 RX ORDER — GLIMEPIRIDE 1 MG
1 TABLET ORAL
Qty: 60 | Refills: 0
Start: 2023-07-23 | End: 2023-08-21

## 2023-07-23 RX ADMIN — IRON SUCROSE 176.67 MILLIGRAM(S): 20 INJECTION, SOLUTION INTRAVENOUS at 05:33

## 2023-07-23 RX ADMIN — HEPARIN SODIUM 5000 UNIT(S): 5000 INJECTION INTRAVENOUS; SUBCUTANEOUS at 05:31

## 2023-07-23 RX ADMIN — Medication 20 MILLIEQUIVALENT(S): at 12:47

## 2023-07-23 RX ADMIN — CHLORHEXIDINE GLUCONATE 1 APPLICATION(S): 213 SOLUTION TOPICAL at 09:30

## 2023-07-23 RX ADMIN — Medication 10 MILLIGRAM(S): at 05:30

## 2023-07-23 RX ADMIN — AMIODARONE HYDROCHLORIDE 400 MILLIGRAM(S): 400 TABLET ORAL at 05:30

## 2023-07-23 RX ADMIN — PANTOPRAZOLE SODIUM 40 MILLIGRAM(S): 20 TABLET, DELAYED RELEASE ORAL at 05:32

## 2023-07-23 RX ADMIN — Medication 9 UNIT(S): at 09:00

## 2023-07-23 RX ADMIN — CLOPIDOGREL BISULFATE 75 MILLIGRAM(S): 75 TABLET, FILM COATED ORAL at 12:46

## 2023-07-23 RX ADMIN — Medication 81 MILLIGRAM(S): at 12:46

## 2023-07-23 RX ADMIN — Medication 50 MILLIGRAM(S): at 05:30

## 2023-07-23 NOTE — DISCHARGE NOTE PROVIDER - CARE PROVIDER_API CALL
Colt Barron  Thoracic and Cardiac Surgery  [address] [place] [address]  Phone: [phone]  Fax: [phone]  Follow Up Time:

## 2023-07-23 NOTE — PROGRESS NOTE ADULT - PROBLEM SELECTOR PROBLEM 3
HTN (hypertension)
HTN (hypertension)
HLD (hyperlipidemia)
HTN (hypertension)
HLD (hyperlipidemia)
HTN (hypertension)
HLD (hyperlipidemia)

## 2023-07-23 NOTE — DISCHARGE NOTE PROVIDER - NSDCCPCAREPLAN_GEN_ALL_CORE_FT
PRINCIPAL DISCHARGE DIAGNOSIS  Diagnosis: Coronary disease  Assessment and Plan of Treatment:       SECONDARY DISCHARGE DIAGNOSES  Diagnosis: Type 2 diabetes mellitus  Assessment and Plan of Treatment:

## 2023-07-23 NOTE — PROGRESS NOTE ADULT - PROBLEM SELECTOR PROBLEM 2
DM2 (diabetes mellitus, type 2)
DM2 (diabetes mellitus, type 2)
3-vessel CAD
HTN (hypertension)
3-vessel CAD
HTN (hypertension)
3-vessel CAD
HTN (hypertension)

## 2023-07-23 NOTE — PROGRESS NOTE ADULT - NS ATTEND AMEND GEN_ALL_CORE FT
Chart, labs, vitals, radiology reviewed. Above H&P reviewed and edited where appropriate. Agree with history and physical exam. Agree with assessment and plan. I reviewed the overnight course of events and discussed the care with the patient/ family.  All the decisions in assessment and plan are solely made by me..
Patient plan of care discussed with the NP
Chart, labs, vitals, radiology reviewed. Above H&P reviewed and edited where appropriate. Agree with history and physical exam. Agree with assessment and plan. I reviewed the overnight course of events and discussed the care with the patient/ family.  All the decisions in assessment and plan are solely made by me..
Patient seen, lab reviewed, plan of care discussed with the NP.
patient seen , lab reviewed, plan of care discussed with the NP
Chart, labs, vitals, radiology reviewed. Above H&P reviewed and edited where appropriate. Agree with history and physical exam. Agree with assessment and plan. I reviewed the overnight course of events and discussed the care with the patient/ family.  All the decisions in assessment and plan are solely made by me..

## 2023-07-23 NOTE — PROGRESS NOTE ADULT - PROBLEM SELECTOR PLAN 3
Suggest to continue medications, monitoring, FU primary team recommendations.
continue statin
Suggest to continue medications, monitoring, FU primary team recommendations.
continue statin
continue statin

## 2023-07-23 NOTE — PROGRESS NOTE ADULT - PROBLEM SELECTOR PLAN 2
On medications,  no chest pain, stable, monitored and followed up by primary cardiothoracic team/cardiology team s/p cabg.
On medications,  no chest pain, stable, monitored and followed up by primary cardiothoracic team/cardiology team. pending cabg
continue lop 12.5 bid
On medications,  no chest pain, stable, monitored and followed up by primary cardiothoracic team/cardiology team.
On medications,  no chest pain, stable, monitored and followed up by primary cardiothoracic team/cardiology team. pending cabg
diabetes diet    ck FS 4x day  Insulin teaching
On medications,  no chest pain, stable, monitored and followed up by primary cardiothoracic team/cardiology team s/p cabg.
On medications,  no chest pain, stable, monitored and followed up by primary cardiothoracic team/cardiology team s/p cabg.
diabetes diet    ck FS 4x day
On medications,  no chest pain, stable, monitored and followed up by primary cardiothoracic team/cardiology team s/p cabg.
continue lop 12.5 bid
continue lop 12.5 bid

## 2023-07-23 NOTE — PROGRESS NOTE ADULT - PROBLEM SELECTOR PLAN 4
Will continue statin, primary team FU.
lizzie consulted for uncontrolled dm- aic 8.2  accuchecks ac and hs  dm diet
Will continue statin, primary team FU.
Will continue statin, primary team FU.
lizzie consulted for uncontrolled dm- aic 8.2  accuchecks ac and hs  dm diet
Will continue statin, primary team FU.
lizzie consulted for uncontrolled dm- aic 8.2  accuchecks ac and hs  dm diet

## 2023-07-23 NOTE — PROGRESS NOTE ADULT - PROBLEM SELECTOR PLAN 1
Will decrease Lantus  20 u at bedtime.  Will continue 6 u before each meal and continue Admelog correction scale coverage. Will continue monitoring FS and FU.   Will continue monitoring FS, log, and glucose trends, will Follow up.  Patient counseled for compliance with consistent low carb diet and exercise as tolerated outpatient.    DC on Janumet 50/1000 mg BID, plus Glimepiride 2mg BID   FU outpatient

## 2023-07-23 NOTE — PROGRESS NOTE ADULT - SUBJECTIVE AND OBJECTIVE BOX
Chief complaint  Patient is a 63y old  Male who presents with a chief complaint of sp  CABG (22 Jul 2023 14:03)         Labs and Fingersticks  CAPILLARY BLOOD GLUCOSE      POCT Blood Glucose.: 82 mg/dL (23 Jul 2023 07:51)  POCT Blood Glucose.: 110 mg/dL (22 Jul 2023 21:44)  POCT Blood Glucose.: 177 mg/dL (22 Jul 2023 18:45)  POCT Blood Glucose.: 100 mg/dL (22 Jul 2023 16:58)  POCT Blood Glucose.: 139 mg/dL (22 Jul 2023 11:45)      Anion Gap: 12 (07-23 @ 10:03)  Anion Gap: 10 (07-22 @ 05:02)      Calcium: 9.2 (07-23 @ 10:03)  Calcium: 8.7 (07-22 @ 05:02)          07-23    135  |  103  |  13  ----------------------------<  146<H>  4.0   |  20<L>  |  0.89    Ca    9.2      23 Jul 2023 10:03  Phos  2.6     07-22  Mg     2.1     07-22                          10.4   9.24  )-----------( 313      ( 23 Jul 2023 10:03 )             32.4     Medications  MEDICATIONS  (STANDING):  aMIOdarone    Tablet 400 milliGRAM(s) Oral every 8 hours  aMIOdarone    Tablet   Oral   aspirin enteric coated 81 milliGRAM(s) Oral daily  atorvastatin 40 milliGRAM(s) Oral at bedtime  bisacodyl Suppository 10 milliGRAM(s) Rectal once  chlorhexidine 2% Cloths 1 Application(s) Topical daily  clopidogrel Tablet 75 milliGRAM(s) Oral daily  dextrose 50% Injectable 50 milliLiter(s) IV Push every 15 minutes  dextrose 50% Injectable 25 milliLiter(s) IV Push every 15 minutes  dextrose Oral Gel 15 Gram(s) Oral once  glucagon  Injectable 1 milliGRAM(s) IntraMuscular once  heparin   Injectable 5000 Unit(s) SubCutaneous every 8 hours  insulin glargine Injectable (LANTUS) 20 Unit(s) SubCutaneous at bedtime  insulin lispro (ADMELOG) corrective regimen sliding scale   SubCutaneous three times a day before meals  insulin lispro (ADMELOG) corrective regimen sliding scale   SubCutaneous at bedtime  insulin lispro Injectable (ADMELOG) 6 Unit(s) SubCutaneous three times a day before meals  insulin regular Infusion 3 Unit(s)/Hr (3 mL/Hr) IV Continuous <Continuous>  metoclopramide Injectable 10 milliGRAM(s) IV Push every 8 hours  metoprolol tartrate 50 milliGRAM(s) Oral two times a day  pantoprazole    Tablet 40 milliGRAM(s) Oral before breakfast  polyethylene glycol 3350 17 Gram(s) Oral daily  senna 2 Tablet(s) Oral at bedtime  sodium chloride 0.9%. 1000 milliLiter(s) (10 mL/Hr) IV Continuous <Continuous>      Physical Exam  General: Patient comfortable in bed   Vital Signs Last 12 Hrs  T(F): 98.9 (07-23-23 @ 04:00), Max: 98.9 (07-23-23 @ 04:00)  HR: 72 (07-23-23 @ 04:00) (72 - 72)  BP: 127/74 (07-23-23 @ 04:00) (127/74 - 127/74)  BP(mean): --  RR: 18 (07-23-23 @ 04:00) (18 - 18)  SpO2: 94% (07-23-23 @ 04:00) (94% - 94%)    CVS: S1S2   Respiratory: No wheezing, no crepitations  GI: Abdomen soft, bowel sounds positive  Musculoskeletal:  moves all extremities  : Voiding

## 2023-07-23 NOTE — PROGRESS NOTE ADULT - PROBLEM SELECTOR PROBLEM 1
3-vessel CAD
DM2 (diabetes mellitus, type 2)
S/P CABG x 3
DM2 (diabetes mellitus, type 2)
S/P CABG x 3
3-vessel CAD
3-vessel CAD
DM2 (diabetes mellitus, type 2)
S/P CABG x 3
DM2 (diabetes mellitus, type 2)

## 2023-07-23 NOTE — PROGRESS NOTE ADULT - PROVIDER SPECIALTY LIST ADULT
CT Surgery
Critical Care
Critical Care
Endocrinology
Endocrinology
CT Surgery
Critical Care
CT Surgery
Endocrinology

## 2023-07-23 NOTE — DISCHARGE NOTE PROVIDER - HOSPITAL COURSE
Patient is 63 year old male with PMHx HTN, T2DM on metformin who presents to [facility] ED after +stress test 7/11 at 2pm. Patient afterwards ~@1530 began complaining of severe chest pressure with radiation to L arm/shoulder and generalized weakness. Pt states he had similar chest pain ~1 month ago but notes chest pain at that time resolved. Patient admits to extensive family history of CAD. Patient denies SOB, palpitations, history of CAD, HF, or recent illness. Patient follows with cardiologist Dr. Posada.  Patient received Brilinta 180 mg x1 in Dr. Posada's office,   ED course- cardiology was called initially for STEMI alert. Upon review of EKG, pt does not meet STEMI criteria- , A fib RVR, LEON in aVR with ST depressions leads II, III, aVF, V3-V6. Patient with continued chest pressure. Pt s/p load with ASA/Heparin as per ACS protocol, 2.5 mg IVP Lopressor x1 and 25 mg PO Lopressor for rate control. Troponin 48.  7/12 patient had Cardiac Cath which revealed 3VCAD. Now for Tx to [facility] for Cardiac Surgery Evaluation.  7/17/23   C3-L  LIMA-LAD | SVG-OM | SVG-PDA  ef 40  Insulin infusion. epi for labile bp  Endo for glucose control  Beta blockers added  7/19 tx sdu

## 2023-07-23 NOTE — PROGRESS NOTE ADULT - ASSESSMENT
Assessment  DMT2: 63y Male with DM T2 with hyperglycemia, A1C 8.2%, was on oral meds at home, now s/p CABG, postop on IV insulin, extubated, diet being advanced, transitioned to basal bolus insulin. Glucose down trending, within target goal. Insulin requirements decreasing.   Will need tight glycemic control for postop wound healing.   CAD: on medications, stable, monitored.   HTN: on antihypertensive medications, monitored, asymptomatic.  HLD: on high intensity statin.     Discussed plan and management with Attending   Wesly Renee NP - TEAMS  Dr Elijah Bradford  [phone]

## 2023-07-23 NOTE — DISCHARGE NOTE PROVIDER - NSDCMRMEDTOKEN_GEN_ALL_CORE_FT
acetaminophen 325 mg oral tablet: 2 tab(s) orally every 6 hours As needed Mild Pain (1 - 3)  amiodarone 200 mg oral tablet: 1 orally once a day  amlodipine-olmesartan 10 mg-20 mg oral tablet: 1 orally once a day  aspirin 81 mg oral delayed release tablet: 1 tab(s) orally once a day  cholecalciferol 1250 mcg (50,000 intl units) oral capsule: 1 orally once a week  clopidogrel 75 mg oral tablet: 1 tab(s) orally once a day  famotidine 20 mg oral tablet: 1 orally once a day  Lipitor 20 mg oral tablet: 1 orally once a day  metFORMIN 500 mg oral tablet: 1 orally 2 times a day  metoprolol tartrate 50 mg oral tablet: 1 tab(s) orally 2 times a day  oxyCODONE 5 mg oral tablet: 1 tab(s) orally every 4 hours as needed for Moderate Pain (4 - 6)  senna leaf extract oral tablet: 2 tab(s) orally once a day (at bedtime)   acetaminophen 325 mg oral tablet: 2 tab(s) orally every 6 hours As needed Mild Pain (1 - 3)  amiodarone 200 mg oral tablet: 1 orally once a day  aspirin 81 mg oral delayed release tablet: 1 tab(s) orally once a day  cholecalciferol 1250 mcg (50,000 intl units) oral capsule: 1 orally once a week  clopidogrel 75 mg oral tablet: 1 tab(s) orally once a day  famotidine 20 mg oral tablet: 1 orally once a day  glimepiride 2 mg oral tablet: 1 tab(s) orally 2 times a day  Janumet 50 mg-1000 mg oral tablet: 1 tab(s) orally 2 times a day  Lipitor 20 mg oral tablet: 1 orally once a day  metoprolol tartrate 50 mg oral tablet: 1 tab(s) orally 2 times a day  oxyCODONE 5 mg oral tablet: 1 tab(s) orally every 4 hours as needed for Moderate Pain (4 - 6)  senna leaf extract oral tablet: 2 tab(s) orally once a day (at bedtime)

## 2023-07-23 NOTE — PROGRESS NOTE ADULT - PROBLEM SELECTOR PROBLEM 4
HLD (hyperlipidemia)
DM2 (diabetes mellitus, type 2)
HLD (hyperlipidemia)
HLD (hyperlipidemia)
DM2 (diabetes mellitus, type 2)
HLD (hyperlipidemia)
DM2 (diabetes mellitus, type 2)
HLD (hyperlipidemia)

## 2023-07-24 ENCOUNTER — APPOINTMENT (OUTPATIENT)
Dept: CARE COORDINATION | Facility: HOME HEALTH | Age: 63
End: 2023-07-24
Payer: COMMERCIAL

## 2023-07-24 VITALS — WEIGHT: 181 LBS | RESPIRATION RATE: 18 BRPM

## 2023-07-24 DIAGNOSIS — E11.9 TYPE 2 DIABETES MELLITUS W/OUT COMPLICATIONS: ICD-10-CM

## 2023-07-24 PROCEDURE — 99024 POSTOP FOLLOW-UP VISIT: CPT

## 2023-07-24 RX ORDER — CLOPIDOGREL BISULFATE 75 MG/1
1 TABLET, FILM COATED ORAL
Qty: 30 | Refills: 0
Start: 2023-07-24 | End: 2023-08-22

## 2023-07-24 RX ORDER — AMIODARONE HYDROCHLORIDE 400 MG/1
1 TABLET ORAL
Qty: 1 | Refills: 0
Start: 2023-07-24 | End: 2023-08-22

## 2023-07-24 RX ORDER — SITAGLIPTIN AND METFORMIN HYDROCHLORIDE 50; 1000 MG/1; MG/1
50-1000 TABLET, FILM COATED ORAL
Refills: 0 | Status: ACTIVE | COMMUNITY

## 2023-07-24 RX ORDER — SENNOSIDES 8.6 MG TABLETS 8.6 MG/1
8.6 TABLET ORAL AT BEDTIME
Refills: 0 | Status: ACTIVE | COMMUNITY

## 2023-07-24 RX ORDER — ATORVASTATIN CALCIUM 20 MG/1
20 TABLET, FILM COATED ORAL AT BEDTIME
Refills: 0 | Status: ACTIVE | COMMUNITY

## 2023-07-24 RX ORDER — ATORVASTATIN CALCIUM 20 MG/1
20 TABLET, FILM COATED ORAL
Refills: 0 | Status: ACTIVE | COMMUNITY

## 2023-07-24 RX ORDER — CLOPIDOGREL 75 MG/1
75 TABLET, FILM COATED ORAL DAILY
Refills: 0 | Status: ACTIVE | COMMUNITY

## 2023-07-24 RX ORDER — METOPROLOL TARTRATE 50 MG/1
50 TABLET, FILM COATED ORAL
Refills: 0 | Status: ACTIVE | COMMUNITY

## 2023-07-24 RX ORDER — FAMOTIDINE 20 MG/1
20 TABLET, FILM COATED ORAL DAILY
Refills: 0 | Status: ACTIVE | COMMUNITY

## 2023-07-24 RX ORDER — ACETAMINOPHEN 500 MG/1
TABLET ORAL EVERY 6 HOURS
Refills: 0 | Status: ACTIVE | COMMUNITY

## 2023-07-24 RX ORDER — OXYCODONE 5 MG/1
5 TABLET ORAL
Refills: 0 | Status: ACTIVE | COMMUNITY

## 2023-07-24 RX ORDER — AMIODARONE HYDROCHLORIDE 200 MG/1
200 TABLET ORAL DAILY
Qty: 14 | Refills: 0 | Status: ACTIVE | COMMUNITY
Start: 2023-07-24 | End: 1900-01-01

## 2023-07-24 RX ORDER — GLIMEPIRIDE 2 MG/1
2 TABLET ORAL
Refills: 0 | Status: ACTIVE | COMMUNITY

## 2023-07-24 NOTE — HISTORY OF PRESENT ILLNESS
[Home] : at home, [unfilled] , at the time of the visit. [Other Location: e.g. Home (Enter Location, City,State)___] : at [unfilled] [Verbal consent obtained from patient] : the patient, [unfilled] [FreeTextEntry1] : Patient is 63 year old male with PMHx HTN, T2DM on metformin who presents to \par [facility] ED after +stress test 7/11 at 2pm. Patient afterwards began \par complaining of severe chest pressure with radiation to L arm/shoulder and \par generalized weakness. Pt states he had similar chest pain month ago but \par notes chest pain at that time resolved. Patient admits to extensive family \par history of CAD. Patient denies SOB, palpitations, history of CAD, HF, or recent \par illness. Patient follows with cardiologist Dr. Posada. \par Patient received Brilinta 180 mg x1 in Dr. Posada's office, \par ED course- cardiology was called initially for STEMI alert. Upon review of EKG, \par pt does not meet STEMI criteria- , A fib RVR, LEON in aVR with ST \par depressions leads II, III, aVF, V3-V6. Patient with continued chest pressure. \par Pt s/p load with ASA/Heparin as per ACS protocol, 2.5 mg IVP Lopressor x1 and \par 25 mg PO Lopressor for rate control. Troponin 48. \par 7/12 patient had Cardiac Cath which revealed 3VCAD. Now for Tx to [facility] for \par Cardiac Surgery Evaluation. \par 7/17/23 C3-L \par LIMA-LAD SVG-OM SVG-PDA  ef 40 \par Insulin infusion. epi for labile bp \par Endo for glucose control \par Beta blockers added \par 7/19 tx sdu \par 7/24 Initial visit completed via [facility]\par \par CC " I am doing ok"

## 2023-07-24 NOTE — REASON FOR VISIT
[Follow-Up: _____] : a [unfilled] follow-up visit [Time Spent: ____ minutes] : Total time spent using  services: [unfilled] minutes. The patient's primary language is not English thus required  services. [FreeTextEntry1] : Transitional Care Management  [Interpreters_IDNumber] : [id number] [Interpreters_FullName] : Marilyn [TWNoteComboBox1] : Vikram

## 2023-07-24 NOTE — PHYSICAL EXAM
[] : no respiratory distress [Exaggerated Use Of Accessory Muscles For Inspiration] : no accessory muscle use [Chest Visual Inspection Thoracic Asymmetry] : no chest asymmetry [Oriented To Time, Place, And Person] : oriented to person, place, and time [FreeTextEntry1] : LLE SVG site priyanka and well approximated. No erythema or drainage  noted

## 2023-07-25 PROBLEM — E11.9 DIABETES: Status: ACTIVE | Noted: 2023-07-25

## 2023-07-26 LAB — NT-PROBNP SERPL-SCNC: 1190 PG/ML — HIGH (ref 0–300)

## 2023-08-02 PROBLEM — Z95.1 S/P CABG X 3: Status: ACTIVE | Noted: 2023-07-24

## 2023-08-02 PROBLEM — Z09 POSTOPERATIVE FOLLOW-UP: Status: ACTIVE | Noted: 2023-08-02

## 2023-08-02 RX ORDER — BLOOD-GLUCOSE METER
W/DEVICE EACH MISCELLANEOUS
Qty: 1 | Refills: 0 | Status: COMPLETED | COMMUNITY
Start: 2023-07-25 | End: 2023-08-02

## 2023-08-07 ENCOUNTER — APPOINTMENT (OUTPATIENT)
Dept: CARDIOTHORACIC SURGERY | Facility: CLINIC | Age: 63
End: 2023-08-07
Payer: COMMERCIAL

## 2023-08-07 VITALS
TEMPERATURE: 98.8 F | DIASTOLIC BLOOD PRESSURE: 81 MMHG | HEIGHT: 66 IN | RESPIRATION RATE: 16 BRPM | SYSTOLIC BLOOD PRESSURE: 143 MMHG | HEART RATE: 78 BPM | WEIGHT: 179 LBS | BODY MASS INDEX: 28.77 KG/M2 | OXYGEN SATURATION: 98 %

## 2023-08-07 DIAGNOSIS — Z95.1 PRESENCE OF AORTOCORONARY BYPASS GRAFT: ICD-10-CM

## 2023-08-07 DIAGNOSIS — Z09 ENCOUNTER FOR FOLLOW-UP EXAMINATION AFTER COMPLETED TREATMENT FOR CONDITIONS OTHER THAN MALIGNANT NEOPLASM: ICD-10-CM

## 2023-08-07 PROCEDURE — 99024 POSTOP FOLLOW-UP VISIT: CPT

## 2023-08-07 NOTE — END OF VISIT
[FreeTextEntry3] :  I,    , personally performed the evaluation and management (E/M) services for this established  patient. That E/M includes conducting the initial examination, assessing all conditions, and establishing the plan of care. Today, AVERY LEAL  was here to observe my evaluation and management services for this patient.

## 2023-08-07 NOTE — CONSULT LETTER
[Dear  ___] : Dear  [unfilled], [Courtesy Letter:] : I had the pleasure of seeing your patient, [unfilled], in my office today. [Please see my note below.] : Please see my note below. [Consult Closing:] : Thank you very much for allowing me to participate in the care of this patient.  If you have any questions, please do not hesitate to contact me. [Sincerely,] : Sincerely, [FreeTextEntry2] : Dr.Syed Posada [FreeTextEntry3] : Colt Barron MD Attending Surgeon  [facility]   Cardiovascular & Thoracic Surgery [facility]

## 2023-08-07 NOTE — REASON FOR VISIT
[Family Member] : family member [de-identified] : Triple coronary artery bypass grafting utilizing the left internal mammary artery to left anterior descending with separate reversed saphenous vein grafts to the obtuse marginal and posterior descending arteries. [de-identified] : 7/17/23

## 2023-08-07 NOTE — ASSESSMENT
[FreeTextEntry1] : Patient is 63 year old male with PMHx HTN, T2DM on metformin who presents to [facility] ED after +stress test 7/11 at 2pm. Patient afterwards @1530 began complaining of severe chest pressure with radiation to L arm/shoulder and generalized weakness. Pt states he had similar chest pain month ago but notes chest pain at that time resolved. Patient admits to extensive family history of CAD. Patient denies SOB, palpitations, history of CAD, HF, or recent illness. Patient follows with cardiologist Dr. Posada.Patient received Brilinta 180 mg x1 in Dr. Posada's office, ED course- cardiology was called initially for STEMI alert. Upon review of EKG,pt does not meet STEMI criteria- , A fib RVR, LEON in aVR with STdepressions leads II, III, aVF, V3-V6. Patient with continued chest pressure.Pt s/p load with ASA/Heparin as per ACS protocol, 2.5 mg IVP Lopressor x1 and 25 mg PO Lopressor for rate control. Troponin 48.7/12 patient had Cardiac Cath which revealed 3VCAD. Now for Tx to [facility] for Cardiac Surgery Evaluation.  He is S/P Triple coronary artery bypass grafting utilizing the left internal mammary artery to left anterior descending with separate reversed saphenous vein grafts to the obtuse marginal and posterior descending arteries on 7/17/23. Post op course uneventful. Discharged to home. He is here for post op visit.   Today he presents and reports that he is doing well. He stated that his blood sugar was 40 - 60 mg/dl but now better. Denies any chest pain, palpitations, dizziness, shortness of breath or pedal edema.   Today on exam patient's lungs clear bilaterally, normal sinus rhythm, sternum stable, incision clean, dry and intact. LT SVG site is clean, dry and intact.  No peripheral edema noted. Staples removed today.   Instructed patient on importance of optimal glycemic control, daily showering, daily weights, any signs of fever (temperature greater than 101F, chills,  incentive spirometer use, and increase ambulation as tolerated. Instructed to call office with any signs or symptoms of infection or weight gain of 2 or more pounds in 1 day or 3 or more pounds in 1 week.    Discussed intake of plant based foods, including vegetables, fruits, and whole grain foods: legumes, nuts and seeds, fish or seafood, lean meats, and non-fat or low-fat diary foods. Plant based oils (non-tropical) in place of solid fats. Instructed patient to limit intake of high fat meats and processed meats, high-fat diary foods, dietary cholesterol and sodium, foods and beverages with added sugars.   Plan: 1) Continue current medication regimen 2) Follow up with cardiologist (Dr. Posada) and PCP  3) May return on as needed basis  4) Ambulate as tolerated  5) Virtual Cardiac Rehab referral 6) Continue to increase activity and walk daily as tolerated. Continue to use incentive spirometer.  7) Keep legs elevated above heart when resting/sitting/sleeping.  8) Call MD if you experience fever, fatigue, dizziness, confusion, syncope, shortness of breath, chest pain not relieved with analgesics, increased redness/drainage from the surgical  incision site

## 2023-08-09 NOTE — ED PROVIDER NOTE - INTERPRETATION
abnormal
You can access the FollowMyHealth Patient Portal offered by [facility] by registering at the following website: [email or web address]. By joining [employer]’s FollowMyHealth portal, you will also be able to view your health information using other applications (apps) compatible with our system.

## 2023-10-24 ENCOUNTER — NON-APPOINTMENT (OUTPATIENT)
Age: 63
End: 2023-10-24

## 2024-07-04 NOTE — OCCUPATIONAL THERAPY INITIAL EVALUATION ADULT - GENERAL OBSERVATIONS, REHAB EVAL
Patient safe to discharge home per ED Provider.  Patient verbalizes understanding, and verbalized understanding to come back to ED with any worsening symptoms.   Refused to be seen in [facility], [facility] TL notified.      Patient found seated in bedside chair in NAD, +IVL +CT +tele +BP cuff +pu;se ox +2L O2 via NC

## (undated) DEVICE — PACK UNIVERSAL CARDIAC

## (undated) DEVICE — SENSOR MYOCARDIAL TEMP 15MM

## (undated) DEVICE — SUT DOUBLE 6 WIRE STERNAL

## (undated) DEVICE — STAPLER SKIN VISI-STAT 35 WIDE

## (undated) DEVICE — SUT PROLENE 6-0 4-30" C-1

## (undated) DEVICE — SUT PROLENE 3-0 36" SH

## (undated) DEVICE — STABILIZER HAND ASSISTANT ATTACHMENT W STABLESOFT 2S

## (undated) DEVICE — SUT PROLENE 8-0 24" BV175-6

## (undated) DEVICE — VISITEC 4X4

## (undated) DEVICE — SUT SILK 4-0 18" TIES

## (undated) DEVICE — CUFF BLOOD PRESSURE ADULT LG

## (undated) DEVICE — TUBING SUCTION 20FT

## (undated) DEVICE — SUT POLYSORB 3-0 30" V-20 UNDYED

## (undated) DEVICE — SUMP PERICARDIAL 20FR 1/4" ADULT

## (undated) DEVICE — TOURNIQUET SET 12FR (1 RED, 1 BLUE, 1 SNARE) 7"

## (undated) DEVICE — SET PERF Y TYPE 13.5IN STRL

## (undated) DEVICE — PREP DURAPREP 26CC

## (undated) DEVICE — DRSG TEGADERM 6"X8"

## (undated) DEVICE — DRAPE 1/2 SHEET 40X57"

## (undated) DEVICE — SYR LUER LOK 50CC

## (undated) DEVICE — APPLICATOR Q TIP 6" WOOD STEM

## (undated) DEVICE — BLADE SCALPEL SAFETYLOCK #15

## (undated) DEVICE — AORTIC PUNCH 4.0MM STANDARD HANDLE

## (undated) DEVICE — FOLEY TRAY 16FR 5CC LF LUBRISIL ADVANCE TEMP CLOSED

## (undated) DEVICE — GLV 7 PROTEXIS (WHITE)

## (undated) DEVICE — NDL COUNTER FOAM AND MAGNET 40-70

## (undated) DEVICE — SUT PROLENE 4-0 36" SH

## (undated) DEVICE — NDL HYPO SAFE 18G X 1.5" (PINK)

## (undated) DEVICE — BLOWER MISTER AXIUS WITH IV SET

## (undated) DEVICE — SOL NORMOSOL-R PH7.4 1000ML

## (undated) DEVICE — SUT PROLENE 4-0 36" BB

## (undated) DEVICE — BLADE SCALPEL SAFETYLOCK #11

## (undated) DEVICE — CONNECTOR STRAIGHT 3/8 X 1/2"

## (undated) DEVICE — DRAIN CHANNEL 32FR ROUND HUBLESS FULL FLUTED

## (undated) DEVICE — DRAPE 3/4 SHEET W REINFORCEMENT 56X77"

## (undated) DEVICE — SUT PROLENE 6-0 4-18" BV-1

## (undated) DEVICE — BLADE ACCESS RAIL DEEP

## (undated) DEVICE — VESSEL LOOP MAXI-BLUE 0.120" X 16"

## (undated) DEVICE — MEDTRONIC URCHIN EVO HEART POSITIONER & CANISTER TUBING SET

## (undated) DEVICE — DRSG DERMABOND PRINEO 60CM

## (undated) DEVICE — CONNECTOR INTERSEPT "Y" 1/4 X 1/4 X 1/4"

## (undated) DEVICE — FILTER REINFUSION FOR SALVAGED BLOOD DISP

## (undated) DEVICE — SUT PROLENE 7-0 24" BV175-8

## (undated) DEVICE — SUT POLYSORB 2-0 30" GS-21 UNDYED

## (undated) DEVICE — SUT BLUNT SZ 5

## (undated) DEVICE — DEFIBRILLATOR PAD PRE-CONNECT ADULT/CHILD

## (undated) DEVICE — DRSG ACE BANDAGE 6"

## (undated) DEVICE — GLV 7.5 PROTEXIS (WHITE)

## (undated) DEVICE — SUT SOFSILK 0 30" V-20

## (undated) DEVICE — SUCTION CATH ARGYLE WHISTLE TIP 14FR STRAIGHT PACKED

## (undated) DEVICE — SUT POLYSORB 0 36" GS-25 UNDYED

## (undated) DEVICE — SAW BLADE MICROAIRE STERNUM 1.1X50X42MM

## (undated) DEVICE — DRAPE IOBAN 23" X 23"

## (undated) DEVICE — DRAPE MAYO STAND 30"

## (undated) DEVICE — SUT SILK 0 30" TIES

## (undated) DEVICE — SUT SURGICAL STEEL 6 30" BP-1

## (undated) DEVICE — DRSG OPSITE 13.75 X 4"

## (undated) DEVICE — GOWN LG

## (undated) DEVICE — POSITIONER FOAM EGG CRATE ULNAR 2PCS (PINK)

## (undated) DEVICE — LAP PAD 18 X 18"

## (undated) DEVICE — SUT PROLENE 7-0 24" BV175-6

## (undated) DEVICE — SAW BLADE MICROAIRE STERNUM 1X34X9.4MM

## (undated) DEVICE — SYR LUER LOK 30CC

## (undated) DEVICE — SUCTION YANKAUER NO CONTROL VENT

## (undated) DEVICE — SUT SOFSILK 4-0 24" CV-15

## (undated) DEVICE — BULLDOG SPRING CLIP 6MM SOFT/SOFT

## (undated) DEVICE — STRYKER INTERPULSE HANDPIECE W IRR SUCTION TUBE

## (undated) DEVICE — PREP BETADINE SPONGE STICKS

## (undated) DEVICE — DRSG PREVENA PEEL & PLACE KIT 20CM

## (undated) DEVICE — SYS VEIN HARVESTING VIRTUOSAPH PLUS W/ RADIAL

## (undated) DEVICE — PACK CUSTOM W/INSPIRE OXYGENATOR

## (undated) DEVICE — SUT BIOSYN 4-0 18" P-12

## (undated) DEVICE — TUBING TRUWAVE PRESSURE MALE/FEMALE 72"

## (undated) DEVICE — PACING CABLE (BROWN) A/V TEMP SCREW DOWN 12FT

## (undated) DEVICE — AORTIC PUNCH 4.0MM LONG LENGTH HANDLE

## (undated) DEVICE — SYR ASEPTO

## (undated) DEVICE — CONNECTOR STRAIGHT 1/2 X 1/2"

## (undated) DEVICE — TUBING IV SET MICROCLAVE ADAPTER

## (undated) DEVICE — STOPCOCK 4-WAY 2 GANG W SWIVEL MALE LUER LOCK

## (undated) DEVICE — SUT PROLENE 7-0 4-24" BV-1

## (undated) DEVICE — SUT STAINLESS STEEL 5 18" SCC

## (undated) DEVICE — SUT PROLENE 5-0 30" RB-2

## (undated) DEVICE — DRAIN RESERVOIR FOR JACKSON PRATT 100CC CARDINAL

## (undated) DEVICE — CHEST DRAIN PLEUR-EVAC WET/WET ADULT-PEDS SINGLE (QUICK)

## (undated) DEVICE — PACK UNIVERSAL CARDIAC SUPPLEMNTAL B

## (undated) DEVICE — TUBING SUCTION NON CONDUCTIVE 316X18" STERILE

## (undated) DEVICE — PACING CABLE A/V TEMP SCREW DOWN 6FT

## (undated) DEVICE — SYR LUER LOK 1CC

## (undated) DEVICE — DRAPE TOWEL BLUE 17" X 24"

## (undated) DEVICE — SYNOVIS VASCULAR PROBE 1.5MM 15CM

## (undated) DEVICE — GLV 8 PROTEXIS (WHITE)

## (undated) DEVICE — URETERAL CATH RED RUBBER 8FR

## (undated) DEVICE — SUT TICRON 4-0 36" CV-331 DA

## (undated) DEVICE — AORTIC PUNCH 5MM STANDARD HANDLE

## (undated) DEVICE — POSITIONER CARDIAC BUMP

## (undated) DEVICE — NDL BLUNT 18G LOOP VESSEL MAXI WHITE

## (undated) DEVICE — SWITCH ARISS TABLE MOUNT EQUAL LEGS 13"

## (undated) DEVICE — DRSG OPSITE 2.5 X 2"

## (undated) DEVICE — GOWN TRIMAX LG

## (undated) DEVICE — VESSEL LOOP MAXI-RED  0.120" X 16"

## (undated) DEVICE — ACROBAT SUV VACUUM OFF PUMP SYSTEM

## (undated) DEVICE — SWITCH ARISS TABLE MOUNT UNEQUAL LEGS 13"

## (undated) DEVICE — SPECIMEN CONTAINER 100ML

## (undated) DEVICE — SUT PLEDGET PRE PUNCH 4.8 X 9.5 X 1.5 MM

## (undated) DEVICE — SUT PROLENE 5-0 36" RB-1

## (undated) DEVICE — DRAPE SLUSH / WARMER 44 X 66"

## (undated) DEVICE — SOL IRR BAG NS 0.9% 3000ML

## (undated) DEVICE — TUBING KIT FAST START ATF 40